# Patient Record
Sex: MALE | Race: WHITE | Employment: FULL TIME | URBAN - METROPOLITAN AREA
[De-identification: names, ages, dates, MRNs, and addresses within clinical notes are randomized per-mention and may not be internally consistent; named-entity substitution may affect disease eponyms.]

---

## 2017-01-26 ENCOUNTER — HOSPITAL ENCOUNTER (OUTPATIENT)
Age: 47
Discharge: HOME | End: 2017-01-26
Attending: FAMILY MEDICINE
Payer: COMMERCIAL

## 2017-01-26 VITALS
WEIGHT: 240 LBS | HEART RATE: 81 BPM | BODY MASS INDEX: 36 KG/M2 | DIASTOLIC BLOOD PRESSURE: 88 MMHG | RESPIRATION RATE: 16 BRPM | OXYGEN SATURATION: 98 % | TEMPERATURE: 98 F | SYSTOLIC BLOOD PRESSURE: 144 MMHG

## 2017-01-26 DIAGNOSIS — H00.011 HORDEOLUM EXTERNUM OF RIGHT UPPER EYELID: Primary | ICD-10-CM

## 2017-01-26 DIAGNOSIS — H10.13 ALLERGIC CONJUNCTIVITIS, BILATERAL: ICD-10-CM

## 2017-01-26 PROCEDURE — 99214 OFFICE O/P EST MOD 30 MIN: CPT

## 2017-01-26 PROCEDURE — 99213 OFFICE O/P EST LOW 20 MIN: CPT

## 2017-01-26 RX ORDER — GENTAMICIN SULFATE 3 MG/ML
2 SOLUTION/ DROPS OPHTHALMIC 3 TIMES DAILY
Qty: 1 BOTTLE | Refills: 0 | Status: SHIPPED | OUTPATIENT
Start: 2017-01-26 | End: 2017-02-02

## 2017-01-26 RX ORDER — OLOPATADINE HYDROCHLORIDE 1 MG/ML
1 SOLUTION/ DROPS OPHTHALMIC 2 TIMES DAILY
Qty: 1 BOTTLE | Refills: 0 | Status: SHIPPED | OUTPATIENT
Start: 2017-01-26 | End: 2017-02-02

## 2017-01-26 NOTE — ED INITIAL ASSESSMENT (HPI)
Patient states she might has a possible eye infection - patient has been wiping eyes. No blurry vision. Patient also states white bumps to eyes.

## 2017-01-26 NOTE — ED PROVIDER NOTES
Patient Seen in: [address] [facility]    History   Patient presents with:   Eye Visual Problem (opthalmic)    Stated Complaint: eye infection    HPI    57-year-old male who presents to the clinic today with chief complaints of possible infectio All other systems reviewed and negative except as noted above. PSFH elements reviewed from today and agreed except as otherwise stated in HPI.     Physical Exam     ED Triage Vitals   BP 01/26/17 0809 144/88 mmHg   Pulse 01/26/17 0809 81   Resp 01/26 presenting with a stye in his right upper eyelid along with allergic conjunctivitis. Gentamicin eyedrops as directed  Patanol eyedrops as directed  Warm compresses to the site. Avoid cold exposure to both eyes. [facility] He may try Benadryl as well.   Patient was

## 2017-04-12 ENCOUNTER — OFFICE VISIT (OUTPATIENT)
Dept: OCCUPATIONAL MEDICINE | Age: 47
End: 2017-04-12
Attending: PHYSICIAN ASSISTANT

## 2017-04-17 RX ORDER — CITALOPRAM 10 MG/1
TABLET ORAL
Qty: 30 TABLET | Refills: 6 | Status: SHIPPED | OUTPATIENT
Start: 2017-04-17 | End: 2017-10-15

## 2017-04-17 NOTE — TELEPHONE ENCOUNTER
LOV- 4/12/16  Last refill-    Citalopram Hydrobromide (CELEXA) 10 MG Oral Tab  6 1/31/2016      Route:   (none)     Patient does not have upcoming appt scheduled.

## 2017-05-26 RX ORDER — LEVOTHYROXINE SODIUM 0.07 MG/1
TABLET ORAL
Qty: 30 TABLET | Refills: 6 | Status: SHIPPED | OUTPATIENT
Start: 2017-05-26 | End: 2017-11-17

## 2017-10-16 RX ORDER — CITALOPRAM HYDROBROMIDE 10 MG/1
TABLET ORAL
Qty: 30 TABLET | Refills: 0 | Status: SHIPPED | OUTPATIENT
Start: 2017-10-16 | End: 2017-12-05

## 2017-11-17 RX ORDER — LEVOTHYROXINE SODIUM 0.07 MG/1
TABLET ORAL
Qty: 30 TABLET | Refills: 6 | Status: SHIPPED | OUTPATIENT
Start: 2017-11-17 | End: 2018-12-05

## 2017-11-17 NOTE — TELEPHONE ENCOUNTER
LOV: 4/12/16 for routine health maintenance  TSH: 4/22/16  Spoke with patient he is leaving to go out of town tomorrow. Patient returning at the end of November.   Annual physical scheduled for Dec.  30 day supply pended to last patient until his scheduled

## 2017-12-04 RX ORDER — CITALOPRAM 10 MG/1
TABLET ORAL
Qty: 30 TABLET | Refills: 0 | OUTPATIENT
Start: 2017-12-04 | End: 2018-01-03

## 2017-12-05 ENCOUNTER — OFFICE VISIT (OUTPATIENT)
Dept: FAMILY MEDICINE CLINIC | Facility: CLINIC | Age: 47
End: 2017-12-05

## 2017-12-05 VITALS
BODY MASS INDEX: 36.83 KG/M2 | HEIGHT: 68 IN | RESPIRATION RATE: 16 BRPM | HEART RATE: 84 BPM | DIASTOLIC BLOOD PRESSURE: 80 MMHG | TEMPERATURE: 98 F | WEIGHT: 243 LBS | SYSTOLIC BLOOD PRESSURE: 116 MMHG

## 2017-12-05 DIAGNOSIS — Z00.00 ROUTINE HEALTH MAINTENANCE: Primary | ICD-10-CM

## 2017-12-05 PROCEDURE — 99396 PREV VISIT EST AGE 40-64: CPT | Performed by: FAMILY MEDICINE

## 2017-12-05 RX ORDER — CITALOPRAM 10 MG/1
10 TABLET ORAL
Qty: 30 TABLET | Refills: 6 | Status: SHIPPED | OUTPATIENT
Start: 2017-12-05 | End: 2018-06-29

## 2017-12-05 RX ORDER — NAPROXEN 500 MG/1
500 TABLET ORAL 2 TIMES DAILY
Qty: 180 TABLET | Refills: 3 | Status: SHIPPED | OUTPATIENT
Start: 2017-12-05 | End: 2018-11-30

## 2017-12-05 NOTE — PROGRESS NOTES
Jaden Swenson is a 41year old male who presents for a complete physical exam.   HPI:   Pt complains of nothing at this time, he is overdue for labs, he was on citalopram and it was working well, but he ran out and he noted that it was working well, and wan Problem Relation Age of Onset   • Cancer Father    • Cancer Maternal Grandmother    • Cancer Maternal Grandfather    • Heart Disease Neg    • Stroke Neg       Social History:  Smoking status: Current Some Day Smoker intact,motor and sensory are grossly intact    ASSESSMENT AND PLAN:   Rahul Aly is a 41year old male who presents for a complete physical exam.  Pt's weight is Body mass index is 36.95 kg/m². , recommended low fat diet and aerobic exercise 30 minutes th

## 2017-12-30 ENCOUNTER — NURSE ONLY (OUTPATIENT)
Dept: FAMILY MEDICINE CLINIC | Facility: CLINIC | Age: 47
End: 2017-12-30

## 2017-12-30 DIAGNOSIS — Z00.00 ROUTINE HEALTH MAINTENANCE: ICD-10-CM

## 2017-12-30 PROCEDURE — 80053 COMPREHEN METABOLIC PANEL: CPT | Performed by: FAMILY MEDICINE

## 2017-12-30 PROCEDURE — 85027 COMPLETE CBC AUTOMATED: CPT | Performed by: FAMILY MEDICINE

## 2017-12-30 PROCEDURE — 80061 LIPID PANEL: CPT | Performed by: FAMILY MEDICINE

## 2017-12-30 PROCEDURE — 36415 COLL VENOUS BLD VENIPUNCTURE: CPT | Performed by: FAMILY MEDICINE

## 2017-12-30 PROCEDURE — 84443 ASSAY THYROID STIM HORMONE: CPT | Performed by: FAMILY MEDICINE

## 2017-12-30 NOTE — PROGRESS NOTES
1 mint and 1 lavender tube were collected from the [facility] using straight needle and 1 attempt. Pt tolerated and was sent home in stable condition.     Heraclio Blackman, 12/30/17, 8:39 AM

## 2018-01-02 ENCOUNTER — TELEPHONE (OUTPATIENT)
Dept: FAMILY MEDICINE CLINIC | Facility: CLINIC | Age: 48
End: 2018-01-02

## 2018-01-02 DIAGNOSIS — E78.00 ELEVATED CHOLESTEROL: Primary | ICD-10-CM

## 2018-01-02 NOTE — TELEPHONE ENCOUNTER
----- Message from Tacos Martinez DO sent at 1/2/2018  8:14 AM CST -----  Can notify Veola Parents aside from his Cholesterol being out of range, compared to last tie, ([facility] I assume is related to the holidays), the rest of his labs looked great, lets just watch o

## 2018-04-07 ENCOUNTER — MED REC SCAN ONLY (OUTPATIENT)
Dept: FAMILY MEDICINE CLINIC | Facility: CLINIC | Age: 48
End: 2018-04-07

## 2018-05-02 ENCOUNTER — PATIENT OUTREACH (OUTPATIENT)
Dept: FAMILY MEDICINE CLINIC | Facility: CLINIC | Age: 48
End: 2018-05-02

## 2018-05-15 ENCOUNTER — HOSPITAL ENCOUNTER (OUTPATIENT)
Dept: GENERAL RADIOLOGY | Age: 48
Discharge: HOME | End: 2018-05-15
Attending: FAMILY MEDICINE
Payer: COMMERCIAL

## 2018-05-15 ENCOUNTER — OFFICE VISIT (OUTPATIENT)
Dept: FAMILY MEDICINE CLINIC | Facility: CLINIC | Age: 48
End: 2018-05-15

## 2018-05-15 VITALS
HEIGHT: 68 IN | BODY MASS INDEX: 36.53 KG/M2 | SYSTOLIC BLOOD PRESSURE: 132 MMHG | WEIGHT: 241 LBS | RESPIRATION RATE: 16 BRPM | DIASTOLIC BLOOD PRESSURE: 84 MMHG | TEMPERATURE: 98 F | HEART RATE: 84 BPM

## 2018-05-15 DIAGNOSIS — M75.82 ROTATOR CUFF TENDONITIS, LEFT: ICD-10-CM

## 2018-05-15 DIAGNOSIS — M25.512 ACUTE PAIN OF LEFT SHOULDER: Primary | ICD-10-CM

## 2018-05-15 DIAGNOSIS — M25.512 ACUTE PAIN OF LEFT SHOULDER: ICD-10-CM

## 2018-05-15 PROCEDURE — 99214 OFFICE O/P EST MOD 30 MIN: CPT | Performed by: FAMILY MEDICINE

## 2018-05-15 PROCEDURE — 73030 X-RAY EXAM OF SHOULDER: CPT | Performed by: FAMILY MEDICINE

## 2018-05-15 RX ORDER — PREDNISONE 10 MG/1
TABLET ORAL
Qty: 18 TABLET | Refills: 0 | Status: SHIPPED | OUTPATIENT
Start: 2018-05-15 | End: 2018-05-24 | Stop reason: ALTCHOICE

## 2018-05-15 NOTE — PROGRESS NOTES
Liseth Torres is a 41year old male.   HPI:   Roger Strange was at work about 2 weeks ago and was trying to remove a 75 pound antenna from its base and he wrenched his shoulder,and since then has had persistent left shoulder pain, he denies any neck pain or paresthe bruits  EXTREMITIES: no cyanosis, clubbing or edema, SHOULDER: the left shoulder shows a positive Push off test, has a positive apprehension test, mildly positive straight arm test, has some pain with abduction/adduction,      ASSESSMENT AND PLAN:   Acute

## 2018-05-17 ENCOUNTER — TELEPHONE (OUTPATIENT)
Dept: FAMILY MEDICINE CLINIC | Facility: CLINIC | Age: 48
End: 2018-05-17

## 2018-05-24 ENCOUNTER — OFFICE VISIT (OUTPATIENT)
Dept: FAMILY MEDICINE CLINIC | Facility: CLINIC | Age: 48
End: 2018-05-24

## 2018-05-24 ENCOUNTER — TELEPHONE (OUTPATIENT)
Dept: FAMILY MEDICINE CLINIC | Facility: CLINIC | Age: 48
End: 2018-05-24

## 2018-05-24 VITALS
DIASTOLIC BLOOD PRESSURE: 92 MMHG | HEART RATE: 84 BPM | RESPIRATION RATE: 20 BRPM | TEMPERATURE: 98 F | WEIGHT: 238 LBS | SYSTOLIC BLOOD PRESSURE: 130 MMHG | BODY MASS INDEX: 36 KG/M2

## 2018-05-24 DIAGNOSIS — M25.512 ACUTE PAIN OF LEFT SHOULDER: ICD-10-CM

## 2018-05-24 DIAGNOSIS — S49.92XD SHOULDER INJURY, LEFT, SUBSEQUENT ENCOUNTER: Primary | ICD-10-CM

## 2018-05-24 PROCEDURE — 99214 OFFICE O/P EST MOD 30 MIN: CPT | Performed by: FAMILY MEDICINE

## 2018-05-24 RX ORDER — TRAMADOL HYDROCHLORIDE 50 MG/1
50 TABLET ORAL EVERY 6 HOURS PRN
Qty: 30 TABLET | Refills: 1 | Status: SHIPPED | OUTPATIENT
Start: 2018-05-24 | End: 2021-11-08 | Stop reason: ALTCHOICE

## 2018-05-24 RX ORDER — CITALOPRAM 10 MG/1
1 TABLET ORAL DAILY
COMMUNITY
Start: 2018-05-17 | End: 2019-05-22

## 2018-05-24 NOTE — TELEPHONE ENCOUNTER
Lm for pt that dopcumentation from [facility] was received today that stated \" Pt is authorized for the MRI of his left shoulder per the order from Dr. Leatha Waters. The bill should be sent to the [facility] Injury Care Billing Department for processing through insurance. \

## 2018-05-24 NOTE — PROGRESS NOTES
Rahul Aly is a 41year old male.   HPI:   Katelyn Jolley was at work about 2 weeks ago and was trying to remove a 75 pound antenna from its base and he wrenched his shoulder,and since then has had persistent left shoulder pain, he denies any neck pain or paresthe 84   Temp 98 °F (36.7 °C) (Temporal)   Resp 20   Wt 238 lb   BMI 36.19 kg/m²   GENERAL: well developed, well nourished,in no apparent distress  SKIN: no rashes,no suspicious lesions  HEENT: atraumatic, normocephalic,ears and throat are clear  NECK: supple,

## 2018-05-24 NOTE — TELEPHONE ENCOUNTER
LM for pt that paperwork has been filled out by Dr. Ant Florian and is waiting to be picked up.     Copy made for our record and sent to scanning

## 2018-05-29 ENCOUNTER — TELEPHONE (OUTPATIENT)
Dept: FAMILY MEDICINE CLINIC | Facility: CLINIC | Age: 48
End: 2018-05-29

## 2018-05-29 NOTE — TELEPHONE ENCOUNTER
Received medical record request from [facility] faxed appt note and x-ray results to fax number [phone]

## 2018-05-30 ENCOUNTER — HOSPITAL ENCOUNTER (OUTPATIENT)
Dept: MRI IMAGING | Age: 48
Discharge: HOME | End: 2018-05-30
Attending: FAMILY MEDICINE
Payer: OTHER MISCELLANEOUS

## 2018-05-30 DIAGNOSIS — S49.92XD SHOULDER INJURY, LEFT, SUBSEQUENT ENCOUNTER: ICD-10-CM

## 2018-05-30 DIAGNOSIS — M25.512 ACUTE PAIN OF LEFT SHOULDER: ICD-10-CM

## 2018-05-30 PROCEDURE — 73221 MRI JOINT UPR EXTREM W/O DYE: CPT | Performed by: FAMILY MEDICINE

## 2018-05-31 ENCOUNTER — MED REC SCAN ONLY (OUTPATIENT)
Dept: FAMILY MEDICINE CLINIC | Facility: CLINIC | Age: 48
End: 2018-05-31

## 2018-05-31 ENCOUNTER — TELEPHONE (OUTPATIENT)
Dept: FAMILY MEDICINE CLINIC | Facility: CLINIC | Age: 48
End: 2018-05-31

## 2018-05-31 NOTE — TELEPHONE ENCOUNTER
Received a request for medical records for a copy of the MRI report to be faxed to Pedro at [phone]  Report printed and faxed.

## 2018-06-01 ENCOUNTER — TELEPHONE (OUTPATIENT)
Dept: FAMILY MEDICINE CLINIC | Facility: CLINIC | Age: 48
End: 2018-06-01

## 2018-06-01 DIAGNOSIS — IMO0001: ICD-10-CM

## 2018-06-01 DIAGNOSIS — M24.112 LABRAL TEAR OF SHOULDER, DEGENERATIVE, LEFT: ICD-10-CM

## 2018-06-01 DIAGNOSIS — S46.002D ROTATOR CUFF INJURY, LEFT, SUBSEQUENT ENCOUNTER: Primary | ICD-10-CM

## 2018-06-01 NOTE — TELEPHONE ENCOUNTER
Need diagnosis, trmt plan and work status, will be faxing form over . [facility] ref 240 [address] #[id number] if need to call.  When calling # given here then use X. [phone]

## 2018-06-04 PROBLEM — IMO0001: Status: ACTIVE | Noted: 2018-06-04

## 2018-06-04 PROBLEM — M24.112 LABRAL TEAR OF SHOULDER, DEGENERATIVE, LEFT: Status: ACTIVE | Noted: 2018-06-04

## 2018-06-04 PROBLEM — S46.002D ROTATOR CUFF INJURY, LEFT, SUBSEQUENT ENCOUNTER: Status: ACTIVE | Noted: 2018-06-04

## 2018-06-29 RX ORDER — CITALOPRAM 10 MG/1
TABLET ORAL
Qty: 30 TABLET | Refills: 6 | Status: SHIPPED | OUTPATIENT
Start: 2018-06-29 | End: 2019-07-07

## 2018-07-30 PROBLEM — M75.122 COMPLETE TEAR OF LEFT ROTATOR CUFF: Status: ACTIVE | Noted: 2018-07-30

## 2018-07-30 PROBLEM — M19.012 ARTHRITIS OF LEFT ACROMIOCLAVICULAR JOINT: Status: ACTIVE | Noted: 2018-07-30

## 2018-07-30 PROBLEM — M19.019 AC JOINT ARTHROPATHY: Status: ACTIVE | Noted: 2018-07-30

## 2018-07-31 ENCOUNTER — HOSPITAL ENCOUNTER (OUTPATIENT)
Dept: GENERAL RADIOLOGY | Age: 48
Discharge: HOME | End: 2018-07-31
Attending: ORTHOPAEDIC SURGERY
Payer: COMMERCIAL

## 2018-07-31 DIAGNOSIS — M19.012 ARTHRITIS OF LEFT ACROMIOCLAVICULAR JOINT: ICD-10-CM

## 2018-07-31 PROCEDURE — 73030 X-RAY EXAM OF SHOULDER: CPT | Performed by: ORTHOPAEDIC SURGERY

## 2018-12-06 RX ORDER — LEVOTHYROXINE SODIUM 0.07 MG/1
TABLET ORAL
Qty: 30 TABLET | Refills: 6 | Status: SHIPPED | OUTPATIENT
Start: 2018-12-06 | End: 2019-10-21

## 2018-12-06 NOTE — TELEPHONE ENCOUNTER
Last refilled on 11/17/17 for # 30 with 6 refills  Last TSH 2.550 on 12/20/17  Last OV 5/24/18  Future Appointments   Date Time Provider Austen Villar   12/18/2018  8:30 AM MD Lyle Kennedy        Thank you.

## 2018-12-19 ENCOUNTER — TELEPHONE (OUTPATIENT)
Dept: FAMILY MEDICINE CLINIC | Facility: CLINIC | Age: 48
End: 2018-12-19

## 2018-12-19 RX ORDER — AMOXICILLIN AND CLAVULANATE POTASSIUM 875; 125 MG/1; MG/1
1 TABLET, FILM COATED ORAL 2 TIMES DAILY
Qty: 20 TABLET | Refills: 0 | Status: SHIPPED | OUTPATIENT
Start: 2018-12-19 | End: 2018-12-29

## 2018-12-19 NOTE — TELEPHONE ENCOUNTER
Patient has a sinus infection is there anyway we can call something in to the [employer] on [facility]? Please call back.

## 2018-12-19 NOTE — TELEPHONE ENCOUNTER
Pt states sxs started yesterday- sinus pressure and congestion. Pt states his face feels like it was hit by a bat. Pt states nose and sinus are clogged. PT has tried Sudafed decongestant but it is not helping. Pt denies fever at this time.      Jimenes

## 2019-05-22 ENCOUNTER — OFFICE VISIT (OUTPATIENT)
Dept: FAMILY MEDICINE CLINIC | Facility: CLINIC | Age: 49
End: 2019-05-22
Payer: COMMERCIAL

## 2019-05-22 VITALS
SYSTOLIC BLOOD PRESSURE: 128 MMHG | TEMPERATURE: 98 F | RESPIRATION RATE: 16 BRPM | BODY MASS INDEX: 36.89 KG/M2 | DIASTOLIC BLOOD PRESSURE: 88 MMHG | HEIGHT: 68 IN | WEIGHT: 243.38 LBS | HEART RATE: 84 BPM

## 2019-05-22 DIAGNOSIS — M79.601 PARESTHESIA AND PAIN OF BOTH UPPER EXTREMITIES: Primary | ICD-10-CM

## 2019-05-22 DIAGNOSIS — R20.2 PARESTHESIA AND PAIN OF BOTH UPPER EXTREMITIES: Primary | ICD-10-CM

## 2019-05-22 DIAGNOSIS — R20.2 PARESTHESIA OF BOTH LOWER EXTREMITIES: ICD-10-CM

## 2019-05-22 DIAGNOSIS — M79.602 PARESTHESIA AND PAIN OF BOTH UPPER EXTREMITIES: Primary | ICD-10-CM

## 2019-05-22 PROCEDURE — 84439 ASSAY OF FREE THYROXINE: CPT | Performed by: FAMILY MEDICINE

## 2019-05-22 PROCEDURE — 80053 COMPREHEN METABOLIC PANEL: CPT | Performed by: FAMILY MEDICINE

## 2019-05-22 PROCEDURE — 82746 ASSAY OF FOLIC ACID SERUM: CPT | Performed by: FAMILY MEDICINE

## 2019-05-22 PROCEDURE — 36415 COLL VENOUS BLD VENIPUNCTURE: CPT | Performed by: FAMILY MEDICINE

## 2019-05-22 PROCEDURE — 83735 ASSAY OF MAGNESIUM: CPT | Performed by: FAMILY MEDICINE

## 2019-05-22 PROCEDURE — 84443 ASSAY THYROID STIM HORMONE: CPT | Performed by: FAMILY MEDICINE

## 2019-05-22 PROCEDURE — 82607 VITAMIN B-12: CPT | Performed by: FAMILY MEDICINE

## 2019-05-22 PROCEDURE — 99214 OFFICE O/P EST MOD 30 MIN: CPT | Performed by: FAMILY MEDICINE

## 2019-05-22 NOTE — PROGRESS NOTES
Cherri Vance is a 57year old male.   HPI:   Meenakshi Baker is here for discussion  of some neurological issues,  He has paresthesia in his  Extremities, he also has cold intolerance, he has urine retention, he saw a urologist and had a complete urology work up, and rashes  RESPIRATORY: denies shortness of breath with exertion  CARDIOVASCULAR: denies chest pain on exertion  GI: denies abdominal pain and denies heartburn  NEURO: denies headaches, but has paresthesia in both UE and LE, HX of carpal tunnel I non dominant

## 2019-05-23 ENCOUNTER — TELEPHONE (OUTPATIENT)
Dept: FAMILY MEDICINE CLINIC | Facility: CLINIC | Age: 49
End: 2019-05-23

## 2019-05-23 DIAGNOSIS — E03.9 ACQUIRED UNDERACTIVE THYROID: Primary | ICD-10-CM

## 2019-05-23 NOTE — TELEPHONE ENCOUNTER
----- Message from Rosita Duran DO sent at 5/23/2019 10:21 AM CDT -----  Can notify Elinda Olszewski, that for the most part his labs look good, his thyroid is just daniel so slightly underactive, but not enough to give him the kinds of symptoms he has.  ALso this is a

## 2019-06-10 ENCOUNTER — TELEPHONE (OUTPATIENT)
Dept: FAMILY MEDICINE CLINIC | Facility: CLINIC | Age: 49
End: 2019-06-10

## 2019-06-10 ENCOUNTER — MED REC SCAN ONLY (OUTPATIENT)
Dept: FAMILY MEDICINE CLINIC | Facility: CLINIC | Age: 49
End: 2019-06-10

## 2019-06-10 NOTE — TELEPHONE ENCOUNTER
Received requested for medical records from [facility].    Records given to JARRED

## 2019-06-24 ENCOUNTER — TELEPHONE (OUTPATIENT)
Dept: FAMILY MEDICINE CLINIC | Facility: CLINIC | Age: 49
End: 2019-06-24

## 2019-06-24 NOTE — TELEPHONE ENCOUNTER
Patient advised. Verbalized underestanding. Made nurse visit.     Future Appointments   Date Time Provider Austen Villar   7/1/2019  8:45 AM  [facility] EM[facility] Mahsa Bahena

## 2019-07-01 ENCOUNTER — TELEPHONE (OUTPATIENT)
Dept: FAMILY MEDICINE CLINIC | Facility: CLINIC | Age: 49
End: 2019-07-01

## 2019-07-01 ENCOUNTER — NURSE ONLY (OUTPATIENT)
Dept: FAMILY MEDICINE CLINIC | Facility: CLINIC | Age: 49
End: 2019-07-01
Payer: COMMERCIAL

## 2019-07-01 DIAGNOSIS — E78.00 ELEVATED CHOLESTEROL: Primary | ICD-10-CM

## 2019-07-01 DIAGNOSIS — E03.9 ACQUIRED UNDERACTIVE THYROID: ICD-10-CM

## 2019-07-01 LAB
CHOLEST SMN-MCNC: 166 MG/DL (ref ?–200)
HDLC SERPL-MCNC: 28 MG/DL (ref 40–59)
LDLC SERPL CALC-MCNC: 88 MG/DL (ref ?–100)
NONHDLC SERPL-MCNC: 138 MG/DL (ref ?–130)
T4 FREE SERPL-MCNC: 0.7 NG/DL (ref 0.8–1.7)
TRIGL SERPL-MCNC: 248 MG/DL (ref 30–149)
TSI SER-ACNC: 6.33 MIU/ML (ref 0.36–3.74)
VLDLC SERPL CALC-MCNC: 50 MG/DL (ref 0–30)

## 2019-07-01 PROCEDURE — 84443 ASSAY THYROID STIM HORMONE: CPT | Performed by: FAMILY MEDICINE

## 2019-07-01 PROCEDURE — 36415 COLL VENOUS BLD VENIPUNCTURE: CPT | Performed by: FAMILY MEDICINE

## 2019-07-01 PROCEDURE — 80061 LIPID PANEL: CPT | Performed by: FAMILY MEDICINE

## 2019-07-01 PROCEDURE — 84439 ASSAY OF FREE THYROXINE: CPT | Performed by: FAMILY MEDICINE

## 2019-07-01 RX ORDER — LEVOTHYROXINE SODIUM 0.1 MG/1
100 TABLET ORAL DAILY
Qty: 30 TABLET | Refills: 0 | Status: SHIPPED | OUTPATIENT
Start: 2019-07-01 | End: 2019-08-15

## 2019-07-01 NOTE — PROGRESS NOTES
There is an  lipid panel in the pt's chart so I checked with Dr. Andi Gannon to see if he wants that lab drawn- he states to draw the lipid panel    Mint tube drawn from left arm with straight needle x1. Pt heavenly well.  Pt left the clinic in stable condition

## 2019-07-01 NOTE — TELEPHONE ENCOUNTER
----- Message from Aliya Rainey DO sent at 7/1/2019  5:14 PM CDT -----  Can notify Itz Soria that his cholesterol profile looks great, but his thryoid needs some work.  Lets go up to 100 MCG and recall his TSH and free T3 in 1 month

## 2019-07-08 RX ORDER — CITALOPRAM 10 MG/1
TABLET ORAL
Qty: 90 TABLET | Refills: 2 | Status: SHIPPED | OUTPATIENT
Start: 2019-07-08 | End: 2019-12-26

## 2019-08-15 RX ORDER — LEVOTHYROXINE SODIUM 0.1 MG/1
TABLET ORAL
Qty: 30 TABLET | Refills: 5 | Status: SHIPPED | OUTPATIENT
Start: 2019-08-15 | End: 2020-03-24

## 2019-08-15 NOTE — TELEPHONE ENCOUNTER
LOV: 5/22/19  Last Refill:7/1/19 #30 0 Rf    No future appointments.     Last TSH: 7/1/19  TSH 6.330

## 2019-09-23 ENCOUNTER — TELEPHONE (OUTPATIENT)
Dept: FAMILY MEDICINE CLINIC | Facility: CLINIC | Age: 49
End: 2019-09-23

## 2019-09-23 NOTE — TELEPHONE ENCOUNTER
LOV: 5/22/19   Last Refill: 7/8/19 #90 2 RF    Pt was not aware he had refills- will contact pharmacy for refill

## 2019-10-04 ENCOUNTER — TELEPHONE (OUTPATIENT)
Dept: FAMILY MEDICINE CLINIC | Facility: CLINIC | Age: 49
End: 2019-10-04

## 2019-10-04 NOTE — TELEPHONE ENCOUNTER
Pt would like to know if DS went over the MRI on the disc he brought in.    Please return call to [phone]

## 2019-10-04 NOTE — TELEPHONE ENCOUNTER
I did finally get it to load and looked at it, and I think he has a couple of areas that could be causing his issues. But did he have a report that went with these?  That would be most helpful, also he can come and get these discs now that we have them down

## 2019-10-04 NOTE — TELEPHONE ENCOUNTER
Patient advised of the information per Dr. Mimi Veronica. Patient will drop off the results on Saturday.  Disc left in blue book for patient

## 2019-10-10 ENCOUNTER — TELEPHONE (OUTPATIENT)
Dept: FAMILY MEDICINE CLINIC | Facility: CLINIC | Age: 49
End: 2019-10-10

## 2019-10-10 NOTE — TELEPHONE ENCOUNTER
Dr. Nova Leyden would like pt to set up visit to go over  MRI results    Pt was advised to set up visit- will call back to schedule- needs to check with wife

## 2019-10-11 ENCOUNTER — MED REC SCAN ONLY (OUTPATIENT)
Dept: FAMILY MEDICINE CLINIC | Facility: CLINIC | Age: 49
End: 2019-10-11

## 2019-10-21 ENCOUNTER — OFFICE VISIT (OUTPATIENT)
Dept: FAMILY MEDICINE CLINIC | Facility: CLINIC | Age: 49
End: 2019-10-21
Payer: COMMERCIAL

## 2019-10-21 VITALS
HEART RATE: 70 BPM | TEMPERATURE: 97 F | BODY MASS INDEX: 35.49 KG/M2 | DIASTOLIC BLOOD PRESSURE: 74 MMHG | RESPIRATION RATE: 16 BRPM | HEIGHT: 69 IN | WEIGHT: 239.63 LBS | SYSTOLIC BLOOD PRESSURE: 122 MMHG | OXYGEN SATURATION: 98 %

## 2019-10-21 DIAGNOSIS — R20.2 PARESTHESIA AND PAIN OF LEFT EXTREMITY: ICD-10-CM

## 2019-10-21 DIAGNOSIS — M50.20 HERNIATED CERVICAL DISC: Primary | ICD-10-CM

## 2019-10-21 DIAGNOSIS — M79.609 PARESTHESIA AND PAIN OF LEFT EXTREMITY: ICD-10-CM

## 2019-10-21 DIAGNOSIS — R20.2 PARESTHESIA OF LOWER EXTREMITY: ICD-10-CM

## 2019-10-21 DIAGNOSIS — M51.26 HERNIATED INTERVERTEBRAL DISC OF LUMBAR SPINE: ICD-10-CM

## 2019-10-21 PROCEDURE — 99214 OFFICE O/P EST MOD 30 MIN: CPT | Performed by: FAMILY MEDICINE

## 2019-10-21 RX ORDER — TRAMADOL HYDROCHLORIDE 50 MG/1
50 TABLET ORAL NIGHTLY
Qty: 30 TABLET | Refills: 0 | Status: SHIPPED | OUTPATIENT
Start: 2019-10-21 | End: 2019-11-20

## 2019-10-21 NOTE — PROGRESS NOTES
Shun Valencia is a 54year old male. HPI:   Huong Negrete is here for review  of his results from MRI's he had done at the [place].  He has had neck pain for quite some time now that has been getting worse, he has persistent neck pain with radiculopathy into the left ne GENERAL: well developed, well nourished,in no apparent distress  SKIN: no rashes,no suspicious lesions  HEENT: atraumatic, normocephalic,ears and throat are clear  NECK: supple,no adenopathy,no bruits, has a positive compression test of the left upper ex

## 2019-12-26 RX ORDER — CITALOPRAM 10 MG/1
TABLET ORAL
Qty: 90 TABLET | Refills: 2 | Status: SHIPPED | OUTPATIENT
Start: 2019-12-26 | End: 2020-04-23

## 2020-01-09 ENCOUNTER — TELEPHONE (OUTPATIENT)
Dept: FAMILY MEDICINE CLINIC | Facility: CLINIC | Age: 50
End: 2020-01-09

## 2020-01-09 NOTE — TELEPHONE ENCOUNTER
RECEIVED MEDICAL RECORD REQUEST FROM [address] [place] [address] REQUESTING MEDICAL RECORDS  AND MEDICAL BILLS FROM:  MAY 8,2018 TO PRESENT    SENT TO SCAN STAT

## 2020-03-24 RX ORDER — LEVOTHYROXINE SODIUM 0.1 MG/1
TABLET ORAL
Qty: 30 TABLET | Refills: 5 | Status: SHIPPED | OUTPATIENT
Start: 2020-03-24 | End: 2020-04-23

## 2020-03-24 NOTE — TELEPHONE ENCOUNTER
Thyroid Supplements Protocol Failed3/24 5:01 AM   TSH value between 0.350 and 5.500 IU/ml    TSH test in past 12 months    Appointment in past 12 or next 3 months     LOV: 10/21/19  Last Refill:8/15/19 #30 5 RF    No future appointments.     Lab Results   C

## 2020-04-23 RX ORDER — LEVOTHYROXINE SODIUM 0.1 MG/1
100 TABLET ORAL DAILY
Qty: 90 TABLET | Refills: 1 | Status: SHIPPED | OUTPATIENT
Start: 2020-04-23

## 2020-04-23 RX ORDER — CITALOPRAM 10 MG/1
10 TABLET ORAL DAILY
Qty: 90 TABLET | Refills: 1 | Status: SHIPPED | OUTPATIENT
Start: 2020-04-23 | End: 2020-10-19

## 2020-04-23 NOTE — TELEPHONE ENCOUNTER
LEVOTHYROXINE SODIUM 100 MCG Oral Tab 30 tablet 5 3/24/2020    Sig:   TAKE 1 TABLET(100 MCG) BY MOUTH DAILY       CITALOPRAM HYDROBROMIDE 10 MG Oral Tab 90 tablet 2 12/26/2019 3/25/2020   Sig:   TAKE 1 TABLET(10 MG) BY MOUTH EVERY DAY         Routing to pr

## 2020-10-19 RX ORDER — CITALOPRAM 10 MG/1
TABLET ORAL
Qty: 90 TABLET | Refills: 3 | Status: SHIPPED | OUTPATIENT
Start: 2020-10-19 | End: 2021-03-25 | Stop reason: DRUGHIGH

## 2020-10-30 ENCOUNTER — TELEPHONE (OUTPATIENT)
Dept: FAMILY MEDICINE CLINIC | Facility: CLINIC | Age: 50
End: 2020-10-30

## 2020-10-30 RX ORDER — NAPROXEN 500 MG/1
500 TABLET ORAL 2 TIMES DAILY WITH MEALS
Qty: 28 TABLET | Refills: 1 | Status: SHIPPED | OUTPATIENT
Start: 2020-10-30 | End: 2020-11-02

## 2020-11-02 ENCOUNTER — TELEPHONE (OUTPATIENT)
Dept: FAMILY MEDICINE CLINIC | Facility: CLINIC | Age: 50
End: 2020-11-02

## 2020-11-02 RX ORDER — NAPROXEN 500 MG/1
500 TABLET ORAL 2 TIMES DAILY WITH MEALS
Qty: 60 TABLET | Refills: 1 | Status: SHIPPED | OUTPATIENT
Start: 2020-11-02 | End: 2020-12-14

## 2020-11-02 NOTE — TELEPHONE ENCOUNTER
PT REQUEST REFILL OF NAPROXEN AND TO SEND IT TO [facility]. MED WAS SENT TO WRONG PHARMACY.     CAN YOU PLEASE CHANGE THIS TO GO TO [facility]    THANK YOU

## 2020-11-09 ENCOUNTER — TELEPHONE (OUTPATIENT)
Dept: FAMILY MEDICINE CLINIC | Facility: CLINIC | Age: 50
End: 2020-11-09

## 2020-11-09 DIAGNOSIS — Z20.822 EXPOSURE TO COVID-19 VIRUS: Primary | ICD-10-CM

## 2020-11-09 NOTE — TELEPHONE ENCOUNTER
Pt called his daughters friend just tested positive for covid. Friend was at their house the entire weekend. Pt states he has no symptoms at all. He is wanting to know what he needs to do?

## 2020-11-09 NOTE — TELEPHONE ENCOUNTER
Daughter's friend got results this morning. Confirms was at their house on Friday and Saturday. Didn't seem like she had any symptoms. Same room, same car (altogether an hour, split up). No masks on in the car or the house.     Answered no to all symptoms

## 2020-11-11 ENCOUNTER — LAB ENCOUNTER (OUTPATIENT)
Dept: LAB | Age: 50
End: 2020-11-11
Attending: FAMILY MEDICINE
Payer: COMMERCIAL

## 2020-11-11 DIAGNOSIS — Z20.822 EXPOSURE TO COVID-19 VIRUS: ICD-10-CM

## 2020-12-02 ENCOUNTER — OFFICE VISIT (OUTPATIENT)
Dept: FAMILY MEDICINE CLINIC | Facility: CLINIC | Age: 50
End: 2020-12-02
Payer: COMMERCIAL

## 2020-12-02 VITALS
RESPIRATION RATE: 16 BRPM | HEART RATE: 72 BPM | SYSTOLIC BLOOD PRESSURE: 134 MMHG | DIASTOLIC BLOOD PRESSURE: 80 MMHG | WEIGHT: 244.81 LBS | TEMPERATURE: 98 F | BODY MASS INDEX: 36 KG/M2

## 2020-12-02 DIAGNOSIS — Z72.0 TOBACCO ABUSE: Primary | ICD-10-CM

## 2020-12-02 DIAGNOSIS — F41.9 ANXIETY: ICD-10-CM

## 2020-12-02 PROBLEM — M51.26 HERNIATED INTERVERTEBRAL DISC OF LUMBAR SPINE: Status: RESOLVED | Noted: 2019-10-21 | Resolved: 2020-12-02

## 2020-12-02 PROBLEM — R20.2 PARESTHESIA OF LOWER EXTREMITY: Status: RESOLVED | Noted: 2019-10-21 | Resolved: 2020-12-02

## 2020-12-02 PROBLEM — M79.609 PARESTHESIA AND PAIN OF LEFT EXTREMITY: Status: RESOLVED | Noted: 2019-10-21 | Resolved: 2020-12-02

## 2020-12-02 PROBLEM — R20.2 PARESTHESIA AND PAIN OF LEFT EXTREMITY: Status: RESOLVED | Noted: 2019-10-21 | Resolved: 2020-12-02

## 2020-12-02 PROCEDURE — 3079F DIAST BP 80-89 MM HG: CPT | Performed by: FAMILY MEDICINE

## 2020-12-02 PROCEDURE — 99214 OFFICE O/P EST MOD 30 MIN: CPT | Performed by: FAMILY MEDICINE

## 2020-12-02 PROCEDURE — 3075F SYST BP GE 130 - 139MM HG: CPT | Performed by: FAMILY MEDICINE

## 2020-12-02 RX ORDER — VARENICLINE TARTRATE 1 MG/1
1 TABLET, FILM COATED ORAL 2 TIMES DAILY
Qty: 60 TABLET | Refills: 6 | Status: SHIPPED | OUTPATIENT
Start: 2020-12-02 | End: 2020-12-03

## 2020-12-02 RX ORDER — VARENICLINE TARTRATE 0.5 (11)-1
0.5 KIT ORAL 2 TIMES DAILY
Qty: 1 PACKAGE | Refills: 0 | Status: SHIPPED | OUTPATIENT
Start: 2020-12-02 | End: 2020-12-03

## 2020-12-02 NOTE — PROGRESS NOTES
Giorgio Cooper is a 52year old male.   HPI:   Yaa Tran is here for discussion of smoking cessation, he has tried in the past , when it first came out and actually had a good experience with chantix, but then had some things change and he went back to smoking, h well  EXAM:   /80 (BP Location: Left arm, Patient Position: Sitting, Cuff Size: large)   Pulse 72   Temp 98.2 °F (36.8 °C) (Temporal)   Resp 16   Wt 244 lb 12.8 oz (111 kg)   BMI 36.15 kg/m²   GENERAL: well developed, well nourished,in no apparent di

## 2020-12-03 ENCOUNTER — TELEPHONE (OUTPATIENT)
Dept: FAMILY MEDICINE CLINIC | Facility: CLINIC | Age: 50
End: 2020-12-03

## 2020-12-03 DIAGNOSIS — Z72.0 TOBACCO ABUSE: ICD-10-CM

## 2020-12-03 RX ORDER — VARENICLINE TARTRATE 1 MG/1
1 TABLET, FILM COATED ORAL 2 TIMES DAILY
Qty: 60 TABLET | Refills: 6 | Status: SHIPPED | OUTPATIENT
Start: 2020-12-03 | End: 2021-07-01

## 2020-12-03 RX ORDER — VARENICLINE TARTRATE 0.5 (11)-1
0.5 KIT ORAL 2 TIMES DAILY
Qty: 1 PACKAGE | Refills: 0 | Status: SHIPPED | OUTPATIENT
Start: 2020-12-03 | End: 2020-12-07

## 2020-12-03 NOTE — TELEPHONE ENCOUNTER
PT CALLED AND ADV THAT PT WAS IN YESTERDAY AND THERE WAS SOME MEDICATION SENT IN TO [facility]. PT ADV THAT HE WOULD PREFER TO HAVE CHANTIX SENT OVER THE [facility] - ABLE TO GET BETTER PRICE ON MEDS.     CAN YOU PLEASE SWITCH [facility]TS OVER TO [facility]

## 2020-12-07 ENCOUNTER — TELEPHONE (OUTPATIENT)
Dept: FAMILY MEDICINE CLINIC | Facility: CLINIC | Age: 50
End: 2020-12-07

## 2020-12-07 DIAGNOSIS — Z72.0 TOBACCO ABUSE: ICD-10-CM

## 2020-12-07 RX ORDER — VARENICLINE TARTRATE 0.5 (11)-1
0.5 KIT ORAL 2 TIMES DAILY
Qty: 1 PACKAGE | Refills: 0 | Status: SHIPPED | OUTPATIENT
Start: 2020-12-07 | End: 2021-01-06

## 2020-12-07 NOTE — TELEPHONE ENCOUNTER
We can send it but usually because the starter amber is a one and done they don’t? Did he try good Rx or even the Chantix coupon? Good RXis still $400 + with coupon at [facility]?     Pt asked if we would please send to [facility]

## 2020-12-07 NOTE — TELEPHONE ENCOUNTER
Pharmacy faxed over medication clarification for Chantix starter pack    They state starter pack should be written  0.5mg  Daily for 3 days, 0.5mg BID for 4 days then 1mg bid for 21 days    Please advise if you would like to resend script with this SIG

## 2020-12-07 NOTE — TELEPHONE ENCOUNTER
The one sent on 12/3 did go to [facility]?     Pt states he wants [facility] to [facility] d/t issues with [facility]

## 2020-12-14 RX ORDER — NAPROXEN 500 MG/1
500 TABLET ORAL 2 TIMES DAILY WITH MEALS
Qty: 60 TABLET | Refills: 1 | Status: SHIPPED | OUTPATIENT
Start: 2020-12-14 | End: 2021-12-20

## 2021-03-25 ENCOUNTER — TELEPHONE (OUTPATIENT)
Dept: FAMILY MEDICINE CLINIC | Facility: CLINIC | Age: 51
End: 2021-03-25

## 2021-03-25 RX ORDER — CITALOPRAM 20 MG/1
20 TABLET ORAL DAILY
Qty: 30 TABLET | Refills: 3 | Status: SHIPPED | OUTPATIENT
Start: 2021-03-25 | End: 2021-03-29

## 2021-03-25 NOTE — TELEPHONE ENCOUNTER
Pt recently stopped taking Varenicline Tartrate (CHANTIX) 1 MG Oral Tab. At last visit, pt and Dr Catherne Angelucci discussed increasing Citalopram once pt finished Chantix. Pt would like the increased dose. Pt was informed Dr Catherne Angelucci is out of the office.       E

## 2021-03-30 RX ORDER — CITALOPRAM 20 MG/1
20 TABLET ORAL DAILY
Qty: 90 TABLET | Refills: 0 | Status: SHIPPED | OUTPATIENT
Start: 2021-03-30 | End: 2021-06-23

## 2021-06-23 RX ORDER — CITALOPRAM 20 MG/1
TABLET ORAL
Qty: 90 TABLET | Refills: 3 | Status: SHIPPED | OUTPATIENT
Start: 2021-06-23

## 2021-06-23 NOTE — TELEPHONE ENCOUNTER
Protocol: none  Last refilled 3/30/21 for #90 with 0 RF  LOV with DS 12/2/20  No future appt  Routed to PCP to advise

## 2021-11-08 NOTE — PROGRESS NOTES
Narciso Goodwin is a 54year old male. HPI:   Tala Riley is here for discussion of ADD issues. He has a new job, that requires a considerable amount of focus  And attention to details, as well as organization.  He finds himself struggling to deal with these things distress  SKIN: no rashes,no suspicious lesions  HEENT: atraumatic, normocephalic,ears and throat are clear  NECK: supple,no adenopathy,no bruits  LUNGS: clear to auscultation  CARDIO: RRR without murmur  GI: good BS's,no masses, HSM or tenderness  EXTREMI

## 2021-11-22 NOTE — PROGRESS NOTES
Yuri Sr is a 54year old male.   HPI:   Carlos IS HERE FOR FOLLOW UP ON HIS ADD, he was started on adderall Xr 10 mg lasts 6 hours, is able to sleep has suppressed his appetite, he has gained 20 pounds since last year and feels like it has helped reduce lesions  HEENT: atraumatic, normocephalic,ears and throat are clear  NECK: supple,no adenopathy,no bruits  LUNGS: clear to auscultation  CARDIO: RRR without murmur  GI: good BS's,no masses, HSM or tenderness  EXTREMITIES: no cyanosis, clubbing or edema  PS

## 2021-11-23 ENCOUNTER — HOSPITAL ENCOUNTER (OUTPATIENT)
Age: 51
Discharge: HOME | End: 2021-11-23
Payer: COMMERCIAL

## 2021-11-23 VITALS
DIASTOLIC BLOOD PRESSURE: 99 MMHG | TEMPERATURE: 98 F | RESPIRATION RATE: 16 BRPM | BODY MASS INDEX: 37 KG/M2 | WEIGHT: 250 LBS | OXYGEN SATURATION: 98 % | SYSTOLIC BLOOD PRESSURE: 131 MMHG | HEART RATE: 75 BPM

## 2021-11-23 DIAGNOSIS — Z20.822 ENCOUNTER FOR LABORATORY TESTING FOR COVID-19 VIRUS: Primary | ICD-10-CM

## 2021-11-23 PROCEDURE — 99202 OFFICE O/P NEW SF 15 MIN: CPT | Performed by: NURSE PRACTITIONER

## 2021-11-23 PROCEDURE — U0002 COVID-19 LAB TEST NON-CDC: HCPCS | Performed by: NURSE PRACTITIONER

## 2021-11-23 NOTE — ED PROVIDER NOTES
Patient Seen in: [facility]      History   Patient presents with:  Covid-19 Test    Stated Complaint: test for travel    Subjective:   68-year-old male who presents IC requires a COVID-19 test in order to travel.   Patient's unknown vaccination well-developed well-nourished nontoxic, non-ill-appearing  HEENT: Normocephalic. Atraumatic. Extraocular motions are intact  NECK: Supple. No meningitic signs are noted. CHEST/LUNGS: Clear to auscultation. There is no respiratory distress noted.   HEA

## 2021-12-08 RX ORDER — DEXTROAMPHETAMINE SACCHARATE, AMPHETAMINE ASPARTATE MONOHYDRATE, DEXTROAMPHETAMINE SULFATE AND AMPHETAMINE SULFATE 3.75; 3.75; 3.75; 3.75 MG/1; MG/1; MG/1; MG/1
15 CAPSULE, EXTENDED RELEASE ORAL EVERY MORNING
Qty: 30 CAPSULE | Refills: 0 | Status: SHIPPED | OUTPATIENT
Start: 2021-12-08 | End: 2022-01-17

## 2021-12-08 NOTE — TELEPHONE ENCOUNTER
Patient called requesting refill for:    Amphetamine-Dextroamphet ER (ADDERALL XR) 10 MG Oral Capsule SR 24 Hr    [facility] #[id number] Raji Ghosh, [address] [address], [phone], [phone]    Per patient DS was niles

## 2021-12-08 NOTE — TELEPHONE ENCOUNTER
Pt states the XR is not lasting longer than the regular dose    PT states if he could go up to 15mg he would like to try    Pt states his stomach has adjusted to the medication and is feeling pretty good.

## 2021-12-20 RX ORDER — NAPROXEN 500 MG/1
500 TABLET ORAL 2 TIMES DAILY WITH MEALS
Qty: 60 TABLET | Refills: 3 | Status: SHIPPED | OUTPATIENT
Start: 2021-12-20

## 2022-01-17 RX ORDER — DEXTROAMPHETAMINE SACCHARATE, AMPHETAMINE ASPARTATE MONOHYDRATE, DEXTROAMPHETAMINE SULFATE AND AMPHETAMINE SULFATE 3.75; 3.75; 3.75; 3.75 MG/1; MG/1; MG/1; MG/1
15 CAPSULE, EXTENDED RELEASE ORAL EVERY MORNING
Qty: 30 CAPSULE | Refills: 0 | Status: SHIPPED | OUTPATIENT
Start: 2022-01-17 | End: 2022-02-16

## 2022-01-17 NOTE — TELEPHONE ENCOUNTER
Amphetamine-Dextroamphet ER 15 MG Oral Capsule SR 24 Hr    [facility] #[id number] Kodak Farmer, [address] [facility] [address], [phone], [phone]    Pt called for a refill on medication verified pharmacy     Thank you

## 2022-03-02 RX ORDER — DEXTROAMPHETAMINE SACCHARATE, AMPHETAMINE ASPARTATE MONOHYDRATE, DEXTROAMPHETAMINE SULFATE AND AMPHETAMINE SULFATE 3.75; 3.75; 3.75; 3.75 MG/1; MG/1; MG/1; MG/1
15 CAPSULE, EXTENDED RELEASE ORAL EVERY MORNING
Qty: 30 CAPSULE | Refills: 0 | Status: SHIPPED | OUTPATIENT
Start: 2022-03-02 | End: 2022-04-01

## 2022-03-02 NOTE — TELEPHONE ENCOUNTER
Pt only has one pill left and he has no refills.    LOV with DS 11/22/21  Amphetamine-Dextroamphet ER 15 MG Oral Capsule

## 2022-04-28 ENCOUNTER — TELEPHONE (OUTPATIENT)
Dept: FAMILY MEDICINE CLINIC | Facility: CLINIC | Age: 52
End: 2022-04-28

## 2022-04-28 NOTE — TELEPHONE ENCOUNTER
FYI:     PT COMING IN ON 5/3/22 FOR NURSE VISIT- PT WAS GOING TO [place] TO GET BLOOD WORK DONE BUT WOULD RATHER COME HERE. DID ADV W/PTS INSURANCE PREFERRED LAB IS [facility]. HE WILL CALL INSURANCE AND SEE COST DIFFERENCE.     THANK YOU    ** THIS WAS FOR THYROID

## 2022-04-28 NOTE — TELEPHONE ENCOUNTER
Routing to provider- pt schedule NV for thyroid labs    No orders in system- please advise what orders are needed    Last Labs 2019

## 2022-05-04 ENCOUNTER — NURSE ONLY (OUTPATIENT)
Dept: FAMILY MEDICINE CLINIC | Facility: CLINIC | Age: 52
End: 2022-05-04
Payer: COMMERCIAL

## 2022-05-04 DIAGNOSIS — Z00.00 ROUTINE HEALTH MAINTENANCE: Primary | ICD-10-CM

## 2022-05-04 LAB
ALBUMIN SERPL-MCNC: 4.5 G/DL (ref 3.4–5)
ALBUMIN/GLOB SERPL: 1.5 {RATIO} (ref 1–2)
ALP LIVER SERPL-CCNC: 88 U/L
ALT SERPL-CCNC: 47 U/L
ANION GAP SERPL CALC-SCNC: 6 MMOL/L (ref 0–18)
AST SERPL-CCNC: 23 U/L (ref 15–37)
BILIRUB SERPL-MCNC: 0.7 MG/DL (ref 0.1–2)
BUN BLD-MCNC: 13 MG/DL (ref 7–18)
CALCIUM BLD-MCNC: 9.3 MG/DL (ref 8.5–10.1)
CHLORIDE SERPL-SCNC: 105 MMOL/L (ref 98–112)
CHOLEST SERPL-MCNC: 157 MG/DL (ref ?–200)
CO2 SERPL-SCNC: 27 MMOL/L (ref 21–32)
CREAT BLD-MCNC: 0.99 MG/DL
ERYTHROCYTE [DISTWIDTH] IN BLOOD BY AUTOMATED COUNT: 12.8 %
FASTING PATIENT LIPID ANSWER: YES
FASTING STATUS PATIENT QL REPORTED: YES
GLOBULIN PLAS-MCNC: 3.1 G/DL (ref 2.8–4.4)
GLUCOSE BLD-MCNC: 123 MG/DL (ref 70–99)
HCT VFR BLD AUTO: 48.4 %
HDLC SERPL-MCNC: 28 MG/DL (ref 40–59)
HGB BLD-MCNC: 16.3 G/DL
LDLC SERPL CALC-MCNC: 83 MG/DL (ref ?–100)
MCH RBC QN AUTO: 29.9 PG (ref 26–34)
MCHC RBC AUTO-ENTMCNC: 33.7 G/DL (ref 31–37)
MCV RBC AUTO: 88.6 FL
NONHDLC SERPL-MCNC: 129 MG/DL (ref ?–130)
OSMOLALITY SERPL CALC.SUM OF ELEC: 287 MOSM/KG (ref 275–295)
PLATELET # BLD AUTO: 238 10(3)UL (ref 150–450)
POTASSIUM SERPL-SCNC: 4.4 MMOL/L (ref 3.5–5.1)
PROT SERPL-MCNC: 7.6 G/DL (ref 6.4–8.2)
RBC # BLD AUTO: 5.46 X10(6)UL
SODIUM SERPL-SCNC: 138 MMOL/L (ref 136–145)
TRIGL SERPL-MCNC: 278 MG/DL (ref 30–149)
TSI SER-ACNC: 2.85 MIU/ML (ref 0.36–3.74)
VLDLC SERPL CALC-MCNC: 44 MG/DL (ref 0–30)
WBC # BLD AUTO: 6.9 X10(3) UL (ref 4–11)

## 2022-05-04 PROCEDURE — 80053 COMPREHEN METABOLIC PANEL: CPT | Performed by: FAMILY MEDICINE

## 2022-05-04 PROCEDURE — 85027 COMPLETE CBC AUTOMATED: CPT | Performed by: FAMILY MEDICINE

## 2022-05-04 PROCEDURE — 80061 LIPID PANEL: CPT | Performed by: FAMILY MEDICINE

## 2022-05-04 PROCEDURE — 84443 ASSAY THYROID STIM HORMONE: CPT | Performed by: FAMILY MEDICINE

## 2022-05-04 RX ORDER — LEVOTHYROXINE SODIUM 0.12 MG/1
125 TABLET ORAL
Refills: 0 | COMMUNITY
Start: 2022-05-04

## 2022-05-04 RX ORDER — DEXTROAMPHETAMINE SACCHARATE, AMPHETAMINE ASPARTATE MONOHYDRATE, DEXTROAMPHETAMINE SULFATE AND AMPHETAMINE SULFATE 3.75; 3.75; 3.75; 3.75 MG/1; MG/1; MG/1; MG/1
15 CAPSULE, EXTENDED RELEASE ORAL EVERY MORNING
Qty: 30 CAPSULE | Refills: 0 | Status: CANCELLED | OUTPATIENT
Start: 2022-05-04 | End: 2022-06-03

## 2022-05-04 NOTE — PATIENT INSTRUCTIONS
Blood work drawn per orders in chart for   1 gold  1 purple  22g right AC . Patient wanted to make sure that Dr. Johnson Neighbours was aware that his synthroid dosage is Synthroid dose is 125 mcg.

## 2022-05-05 ENCOUNTER — TELEPHONE (OUTPATIENT)
Dept: FAMILY MEDICINE CLINIC | Facility: CLINIC | Age: 52
End: 2022-05-05

## 2022-05-05 RX ORDER — FENOFIBRATE 54 MG/1
54 TABLET ORAL DAILY
Qty: 90 TABLET | Refills: 2 | Status: SHIPPED | OUTPATIENT
Start: 2022-05-05 | End: 2022-08-03

## 2022-05-05 RX ORDER — DEXTROAMPHETAMINE SACCHARATE, AMPHETAMINE ASPARTATE MONOHYDRATE, DEXTROAMPHETAMINE SULFATE AND AMPHETAMINE SULFATE 3.75; 3.75; 3.75; 3.75 MG/1; MG/1; MG/1; MG/1
15 CAPSULE, EXTENDED RELEASE ORAL EVERY MORNING
Qty: 30 CAPSULE | Refills: 0 | Status: SHIPPED | OUTPATIENT
Start: 2022-05-05 | End: 2022-06-04

## 2022-05-05 RX ORDER — FENOFIBRATE 67 MG/1
67 CAPSULE ORAL DAILY
Qty: 30 CAPSULE | Refills: 3 | Status: CANCELLED | OUTPATIENT
Start: 2022-05-05 | End: 2022-06-04

## 2022-05-05 NOTE — TELEPHONE ENCOUNTER
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 His  kidney, liver function, blood sugar and thyroid were all normal. I'd like to start him on something for the TG called fenofibrate, to help bring those TG down, long term that can be a risk for coronary disease.  It's taken once a day and then we'd recheck his LIPIDS in 3-4 months

## 2022-05-05 NOTE — TELEPHONE ENCOUNTER
Pt was advised of results- verbalized understanding    Agreeable to starting Fenofibrate- not pended need medication dosage    Recall placed for lipid

## 2022-06-08 RX ORDER — LEVOTHYROXINE SODIUM 0.12 MG/1
125 TABLET ORAL
Qty: 90 TABLET | Refills: 2 | Status: SHIPPED | OUTPATIENT
Start: 2022-06-08 | End: 2022-09-06

## 2022-06-08 NOTE — TELEPHONE ENCOUNTER
LOV: 11/22/21   Last Refill: 5/4/22        Lab Results   Component Value Date    T4F 0.7 (L) 07/01/2019    TSH 2.850 05/04/2022

## 2022-06-08 NOTE — TELEPHONE ENCOUNTER
Patient requests refill    levothyroxine 125 MCG Oral Tab    [facility] [address] [facility]

## 2022-06-21 ENCOUNTER — APPOINTMENT (OUTPATIENT)
Dept: DERMATOLOGY | Age: 52
End: 2022-06-21

## 2022-07-25 RX ORDER — DEXTROAMPHETAMINE SACCHARATE, AMPHETAMINE ASPARTATE MONOHYDRATE, DEXTROAMPHETAMINE SULFATE AND AMPHETAMINE SULFATE 3.75; 3.75; 3.75; 3.75 MG/1; MG/1; MG/1; MG/1
15 CAPSULE, EXTENDED RELEASE ORAL EVERY MORNING
Qty: 30 CAPSULE | Refills: 0 | Status: SHIPPED | OUTPATIENT
Start: 2022-07-25 | End: 2022-08-24

## 2022-07-25 NOTE — TELEPHONE ENCOUNTER
PT CALLED AND ADV NEEDS REFILLS OF     Amphetamine-Dextroamphet ER 15 MG Oral Capsule SR 24 Hr    PT TOOK LAST PILL TODAY.     [address]

## 2022-07-25 NOTE — TELEPHONE ENCOUNTER
Routing to provider per protocol. Amphetamine-Dextroamphet ER 15 MG Oral Capsule SR 24 Hr  Last refilled on 5/5/22 for #30  with 0 rf. Last labs 5/4/22. Last seen on 11/22/21. No future appointments. Thank you. Patient took last pill today.

## 2022-08-30 ENCOUNTER — TELEPHONE (OUTPATIENT)
Dept: FAMILY MEDICINE CLINIC | Facility: CLINIC | Age: 52
End: 2022-08-30

## 2022-08-30 NOTE — TELEPHONE ENCOUNTER
Letter mailed to patient reminding him he has overdue orders. Orders to go to [facility] have been enclosed with letter.     Order Code Tests Ordered (Total: 1)    Order Code Tests Ordered      66402 COMP METABOLIC PANEL (14)         Order Code Tests Ordered (Total: 1)    Order Code Tests Ordered      7600 LIPID PANEL

## 2022-09-01 RX ORDER — DEXTROAMPHETAMINE SACCHARATE, AMPHETAMINE ASPARTATE MONOHYDRATE, DEXTROAMPHETAMINE SULFATE AND AMPHETAMINE SULFATE 3.75; 3.75; 3.75; 3.75 MG/1; MG/1; MG/1; MG/1
15 CAPSULE, EXTENDED RELEASE ORAL EVERY MORNING
Qty: 30 CAPSULE | Refills: 0 | Status: SHIPPED | OUTPATIENT
Start: 2022-09-01 | End: 2022-10-01

## 2022-09-01 NOTE — TELEPHONE ENCOUNTER
PT CALLED AND ADV NEEDS REFILL OF     Amphetamine-Dextroamphet ER 15 MG Oral Capsule SR 24 Hr    PLEASE SEND TO [facility] [address]      THANK YOU

## 2022-09-30 ENCOUNTER — TELEPHONE (OUTPATIENT)
Dept: FAMILY MEDICINE CLINIC | Facility: CLINIC | Age: 52
End: 2022-09-30

## 2022-09-30 NOTE — TELEPHONE ENCOUNTER
Amphetamine-Dextroamphet ER 15 MG Oral Capsule SR 24 Hr      Pt would like sent to   [facility] [address] [facility], [address] [place] [address],   [facility] [address] [facility], [phone], [phone]

## 2022-10-01 RX ORDER — DEXTROAMPHETAMINE SACCHARATE, AMPHETAMINE ASPARTATE MONOHYDRATE, DEXTROAMPHETAMINE SULFATE AND AMPHETAMINE SULFATE 3.75; 3.75; 3.75; 3.75 MG/1; MG/1; MG/1; MG/1
15 CAPSULE, EXTENDED RELEASE ORAL EVERY MORNING
Qty: 30 CAPSULE | Refills: 0 | Status: SHIPPED | OUTPATIENT
Start: 2022-10-01 | End: 2022-10-31

## 2022-10-25 RX ORDER — DEXTROAMPHETAMINE SACCHARATE, AMPHETAMINE ASPARTATE MONOHYDRATE, DEXTROAMPHETAMINE SULFATE AND AMPHETAMINE SULFATE 3.75; 3.75; 3.75; 3.75 MG/1; MG/1; MG/1; MG/1
15 CAPSULE, EXTENDED RELEASE ORAL EVERY MORNING
Qty: 30 CAPSULE | Refills: 0 | Status: SHIPPED | OUTPATIENT
Start: 2022-10-25 | End: 2022-11-24

## 2022-10-25 NOTE — TELEPHONE ENCOUNTER
Routing to provider per protocol. Amphetamine-Dextroamphet ER 15 MG Oral Capsule SR 24 Hr  Last refilled on 10/1/22 for #30  with 0 rf. Last labs 5/4/22. Last seen on 11/22/21. No future appointments. Thank you.

## 2022-11-21 RX ORDER — DEXTROAMPHETAMINE SACCHARATE, AMPHETAMINE ASPARTATE MONOHYDRATE, DEXTROAMPHETAMINE SULFATE AND AMPHETAMINE SULFATE 3.75; 3.75; 3.75; 3.75 MG/1; MG/1; MG/1; MG/1
15 CAPSULE, EXTENDED RELEASE ORAL EVERY MORNING
Qty: 30 CAPSULE | Refills: 0 | Status: SHIPPED | OUTPATIENT
Start: 2022-11-21 | End: 2022-12-21

## 2022-11-21 NOTE — TELEPHONE ENCOUNTER
Amphetamine-Dextroamphet ER 15 MG Oral Capsule SR 24 Hr    Pt is going out of town on Saturday would like to have refilled prior to leaving.  Thank you       [facility]   [facility] [address] [facility], [address] [facility] 70, [phone], [phone]

## 2022-11-21 NOTE — TELEPHONE ENCOUNTER
Routing to provider per protocol. Amphetamine-Dextroamphet ER 15 MG Oral Capsule SR 24 Hr  Last refilled on 10/25/22 for #30  with 0 rf. Last labs 5/4/22. Last seen on 11/22/21. No future appointments. Thank you.

## 2022-11-25 ENCOUNTER — TELEPHONE (OUTPATIENT)
Dept: FAMILY MEDICINE CLINIC | Facility: CLINIC | Age: 52
End: 2022-11-25

## 2022-11-25 NOTE — TELEPHONE ENCOUNTER
Pt called he is needing to  his script for   Amphetamine-Dextroamphet ER 15 MG Oral Capsule SR 24 Hr    Per pharmacy he is unable to get script until the 27th unless dr's office calls with reason why he is getting it sooner. Can we please call pharmacy for pt as he is going out of town today?

## 2022-11-25 NOTE — TELEPHONE ENCOUNTER
Pharmacist notified and verbalized understanding. Per pharmacist patient is at pharmacy. She will notify patient.

## 2023-01-11 RX ORDER — DEXTROAMPHETAMINE SACCHARATE, AMPHETAMINE ASPARTATE MONOHYDRATE, DEXTROAMPHETAMINE SULFATE AND AMPHETAMINE SULFATE 3.75; 3.75; 3.75; 3.75 MG/1; MG/1; MG/1; MG/1
15 CAPSULE, EXTENDED RELEASE ORAL EVERY MORNING
Qty: 30 CAPSULE | Refills: 0 | Status: SHIPPED | OUTPATIENT
Start: 2023-01-11 | End: 2023-02-10

## 2023-01-11 NOTE — TELEPHONE ENCOUNTER
Routing to provider per protocol. Amphetamine-Dextroamphet ER 15 MG Oral Capsule SR 24 Hr  Last refilled on 11/21/22 for #30  with 0 rf. Last labs 5/4/22. Last seen on 11/22/21. No future appointments. Thank you.

## 2023-01-11 NOTE — TELEPHONE ENCOUNTER
Patient requests refill    Amphetamine-Dextroamphet ER 15 MG Oral Capsule SR 24 Hr    [facility]

## 2023-02-20 RX ORDER — DEXTROAMPHETAMINE SACCHARATE, AMPHETAMINE ASPARTATE MONOHYDRATE, DEXTROAMPHETAMINE SULFATE AND AMPHETAMINE SULFATE 3.75; 3.75; 3.75; 3.75 MG/1; MG/1; MG/1; MG/1
15 CAPSULE, EXTENDED RELEASE ORAL EVERY MORNING
Qty: 30 CAPSULE | Refills: 0 | Status: SHIPPED | OUTPATIENT
Start: 2023-02-20 | End: 2023-03-22

## 2023-03-08 NOTE — TELEPHONE ENCOUNTER
Pt called to request a script for the following medication be filled. He is experiencing pain in his neck and stated Dr Olivera Fearing has filled it in the past for the pain.   Naproxen 500 MG Oral Tab   [facility], [address] [facility] [address] [Dear  ___] : Dear  [unfilled], [Courtesy Letter:] : I had the pleasure of seeing your patient, [unfilled], in my office today. [Please see my note below.] : Please see my note below. [Sincerely,] : Sincerely, [DrChano  ___] : Dr. REMY [DrChano ___] : Dr. REMY [FreeTextEntry3] : Elvis Aj MD

## 2023-03-17 NOTE — TELEPHONE ENCOUNTER
Last OV 11-  Last lab 5/4/22 tsh 2.850  Last refilled 6/8/22  #90  2 refills      Thyroid Supplements Protocol Failed 03/17/2023 04:27 PM   Protocol Details  Appointment in past 12 or next 3 months    TSH test in past 12 months    TSH value between 0.350 and 5.500 IU/ml

## 2023-03-18 RX ORDER — LEVOTHYROXINE SODIUM 0.12 MG/1
125 TABLET ORAL
Qty: 90 TABLET | Refills: 2 | Status: SHIPPED | OUTPATIENT
Start: 2023-03-18 | End: 2023-06-16

## 2023-04-04 RX ORDER — DEXTROAMPHETAMINE SACCHARATE, AMPHETAMINE ASPARTATE MONOHYDRATE, DEXTROAMPHETAMINE SULFATE AND AMPHETAMINE SULFATE 3.75; 3.75; 3.75; 3.75 MG/1; MG/1; MG/1; MG/1
15 CAPSULE, EXTENDED RELEASE ORAL EVERY MORNING
Qty: 30 CAPSULE | Refills: 0 | Status: SHIPPED | OUTPATIENT
Start: 2023-04-04 | End: 2023-05-04

## 2023-04-04 NOTE — TELEPHONE ENCOUNTER
[facility] [address] [facility], [address] [facility],   [facility], [phone], [facility] [address]   Phone: [phone] Fax: [phone]   Hours: Not open 24 hours   [address], [address] [phone], [phone]   [address] [place] [address]   Phone: [phone] Fax: [phone]   Hours: Not open 24 hours         PATIENT IS REQUESTING REFILL FOR HIS ADDERALL. HE PREFERS PRESCRIPTION REFILL TO BE SENT TO [facility], BUT IF NOT THE [facility] IS HIS PHARMACY.

## 2023-05-16 RX ORDER — DEXTROAMPHETAMINE SACCHARATE, AMPHETAMINE ASPARTATE MONOHYDRATE, DEXTROAMPHETAMINE SULFATE AND AMPHETAMINE SULFATE 3.75; 3.75; 3.75; 3.75 MG/1; MG/1; MG/1; MG/1
15 CAPSULE, EXTENDED RELEASE ORAL EVERY MORNING
Qty: 30 CAPSULE | Refills: 0 | Status: SHIPPED | OUTPATIENT
Start: 2023-05-16 | End: 2023-06-15

## 2023-05-16 NOTE — TELEPHONE ENCOUNTER
Shanna Contreras requesting Medication Refill for:  Amphetamine-Dextroamphet ER 15 MG Oral Capsule SR 24 Hr    LOV: 11/22/21   Last Refill date: 4/4/23  Pharmacy: [facility] [phone] [facility] [address]  Next Scheduled appointment: n/a

## 2023-06-29 RX ORDER — DEXTROAMPHETAMINE SACCHARATE, AMPHETAMINE ASPARTATE MONOHYDRATE, DEXTROAMPHETAMINE SULFATE AND AMPHETAMINE SULFATE 3.75; 3.75; 3.75; 3.75 MG/1; MG/1; MG/1; MG/1
15 CAPSULE, EXTENDED RELEASE ORAL EVERY MORNING
Qty: 30 CAPSULE | Refills: 0 | Status: SHIPPED | OUTPATIENT
Start: 2023-06-29 | End: 2023-07-29

## 2023-09-06 RX ORDER — DEXTROAMPHETAMINE SACCHARATE, AMPHETAMINE ASPARTATE MONOHYDRATE, DEXTROAMPHETAMINE SULFATE AND AMPHETAMINE SULFATE 3.75; 3.75; 3.75; 3.75 MG/1; MG/1; MG/1; MG/1
15 CAPSULE, EXTENDED RELEASE ORAL EVERY MORNING
Qty: 30 CAPSULE | Refills: 0 | Status: SHIPPED | OUTPATIENT
Start: 2023-09-06 | End: 2023-10-06

## 2023-09-30 ENCOUNTER — HOSPITAL ENCOUNTER (OUTPATIENT)
Age: 53
Discharge: HOME | End: 2023-09-30
Payer: COMMERCIAL

## 2023-09-30 VITALS
SYSTOLIC BLOOD PRESSURE: 136 MMHG | RESPIRATION RATE: 18 BRPM | BODY MASS INDEX: 37.03 KG/M2 | HEART RATE: 108 BPM | HEIGHT: 69 IN | TEMPERATURE: 97 F | WEIGHT: 250 LBS | OXYGEN SATURATION: 97 % | DIASTOLIC BLOOD PRESSURE: 85 MMHG

## 2023-09-30 DIAGNOSIS — S61.219A LACERATION OF FINGER OF LEFT HAND WITHOUT FOREIGN BODY, NAIL DAMAGE STATUS UNSPECIFIED, UNSPECIFIED FINGER, INITIAL ENCOUNTER: Primary | ICD-10-CM

## 2023-09-30 NOTE — DISCHARGE INSTRUCTIONS
Return to the [facility] if worse or any concerns  Follow up with your primary care physician in 3 days for reevaluation and wound check. Suture removal in 10-14days  Keep wound clean and dry. Apply triple ointment antibiotic to the affected area twice a day  Scar may remain, keep scar out of the sun.

## 2023-11-07 RX ORDER — DEXTROAMPHETAMINE SACCHARATE, AMPHETAMINE ASPARTATE MONOHYDRATE, DEXTROAMPHETAMINE SULFATE AND AMPHETAMINE SULFATE 3.75; 3.75; 3.75; 3.75 MG/1; MG/1; MG/1; MG/1
15 CAPSULE, EXTENDED RELEASE ORAL EVERY MORNING
Qty: 30 CAPSULE | Refills: 0 | Status: SHIPPED | OUTPATIENT
Start: 2023-11-07 | End: 2023-12-07

## 2023-11-07 NOTE — TELEPHONE ENCOUNTER
Adderall [facility] [address]. Pt wants to know if Dr Ashlyn Urias will put him back on citalopram. Pt is out of the adderall.

## 2023-12-13 NOTE — TELEPHONE ENCOUNTER
Routing to provider per protocol. Amphetamine-Dextroamphet ER 15 MG Oral Capsule SR 24 Hr   Last refilled on 9/6/23 for #30  with 0 rf. Last labs 5/4/22. Last seen on 11/22/21. No future appointments. Thank you. Spoke with patient, advised patient DS out of office and needs OV to discuss citalopram as patient has not been seen since 2021. Patient states he will call back to schedule appt. Quality 226: Preventive Care And Screening: Tobacco Use: Screening And Cessation Intervention: Patient screened for tobacco use and is an ex/non-smoker Detail Level: Detailed Quality 431: Preventive Care And Screening: Unhealthy Alcohol Use - Screening: Patient not identified as an unhealthy alcohol user when screened for unhealthy alcohol use using a systematic screening method Quality 110: Preventive Care And Screening: Influenza Immunization: Influenza Immunization Administered during Influenza season

## 2023-12-19 LAB
AMB EXT BILIRUBIN URINE: NEGATIVE
AMB EXT GLUCOSE URINE: NEGATIVE
AMB EXT HEMATOCRIT: 48.8
AMB EXT HEMOGLOBIN: 16.9
AMB EXT KETONES URINE: NEGATIVE
AMB EXT MCV: 86.7
AMB EXT NITRITE URINE: NEGATIVE
AMB EXT PLATELETS: 266
AMB EXT TSH: 1.51 MIU/ML
AMB EXT URINE SPECIFIC GRAVITY: 1.02
AMB EXT WBC: 9.1 X10(3)UL

## 2024-01-11 ENCOUNTER — TELEPHONE (OUTPATIENT)
Dept: FAMILY MEDICINE CLINIC | Facility: CLINIC | Age: 54
End: 2024-01-11

## 2024-01-11 ENCOUNTER — MED REC SCAN ONLY (OUTPATIENT)
Dept: FAMILY MEDICINE CLINIC | Facility: CLINIC | Age: 54
End: 2024-01-11

## 2024-01-11 ENCOUNTER — OFFICE VISIT (OUTPATIENT)
Dept: FAMILY MEDICINE CLINIC | Facility: CLINIC | Age: 54
End: 2024-01-11
Payer: COMMERCIAL

## 2024-01-11 VITALS
SYSTOLIC BLOOD PRESSURE: 128 MMHG | HEART RATE: 82 BPM | DIASTOLIC BLOOD PRESSURE: 88 MMHG | HEIGHT: 68.5 IN | TEMPERATURE: 98 F | BODY MASS INDEX: 38.24 KG/M2 | OXYGEN SATURATION: 97 % | RESPIRATION RATE: 12 BRPM | WEIGHT: 255.25 LBS

## 2024-01-11 DIAGNOSIS — R20.2 PARESTHESIA AND PAIN OF BOTH UPPER EXTREMITIES: ICD-10-CM

## 2024-01-11 DIAGNOSIS — Z12.11 COLON CANCER SCREENING: ICD-10-CM

## 2024-01-11 DIAGNOSIS — R20.2 BILATERAL LEG PARESTHESIA: ICD-10-CM

## 2024-01-11 DIAGNOSIS — M48.02 STENOSIS OF CERVICAL SPINE WITH MYELOPATHY (HCC): ICD-10-CM

## 2024-01-11 DIAGNOSIS — Z00.00 ROUTINE HEALTH MAINTENANCE: ICD-10-CM

## 2024-01-11 DIAGNOSIS — R33.9 URINARY RETENTION: ICD-10-CM

## 2024-01-11 DIAGNOSIS — M79.601 PARESTHESIA AND PAIN OF BOTH UPPER EXTREMITIES: ICD-10-CM

## 2024-01-11 DIAGNOSIS — Z12.5 PROSTATE CANCER SCREENING: Primary | ICD-10-CM

## 2024-01-11 DIAGNOSIS — M79.602 PARESTHESIA AND PAIN OF BOTH UPPER EXTREMITIES: ICD-10-CM

## 2024-01-11 DIAGNOSIS — G99.2 STENOSIS OF CERVICAL SPINE WITH MYELOPATHY (HCC): ICD-10-CM

## 2024-01-11 LAB
Lab: 9.4 MCG/DL (ref 6.1–12.2)
T3, TOTAL: 134

## 2024-01-11 PROCEDURE — 99396 PREV VISIT EST AGE 40-64: CPT | Performed by: FAMILY MEDICINE

## 2024-01-11 PROCEDURE — 3008F BODY MASS INDEX DOCD: CPT | Performed by: FAMILY MEDICINE

## 2024-01-11 PROCEDURE — 3079F DIAST BP 80-89 MM HG: CPT | Performed by: FAMILY MEDICINE

## 2024-01-11 PROCEDURE — 3074F SYST BP LT 130 MM HG: CPT | Performed by: FAMILY MEDICINE

## 2024-01-11 RX ORDER — LEVOTHYROXINE SODIUM 125 MCG
125 TABLET ORAL NIGHTLY
COMMUNITY
Start: 2024-01-03

## 2024-01-11 RX ORDER — CITALOPRAM 20 MG/1
20 TABLET ORAL DAILY
Qty: 90 TABLET | Refills: 3 | Status: SHIPPED | OUTPATIENT
Start: 2024-01-11

## 2024-01-12 ENCOUNTER — TELEPHONE (OUTPATIENT)
Dept: FAMILY MEDICINE CLINIC | Facility: CLINIC | Age: 54
End: 2024-01-12

## 2024-01-12 NOTE — TELEPHONE ENCOUNTER
----- Message from Rui Garcia DO sent at 1/12/2024  9:12 AM CST -----  Please notify Carmelo that his thyroid labs looked good, keep the dose the same , and schedule MRI. Recheck TSH in 1 year

## 2024-01-14 LAB
ALBUMIN/GLOBULIN RATIO: 2.5 (CALC) (ref 1–2.5)
ALBUMIN: 5 G/DL (ref 3.6–5.1)
ALKALINE PHOSPHATASE: 77 U/L (ref 35–144)
ALT: 29 U/L (ref 9–46)
APPEARANCE: CLEAR
AST: 19 U/L (ref 10–35)
BILIRUBIN, TOTAL: 0.3 MG/DL (ref 0.2–1.2)
BILIRUBIN: NEGATIVE
BUN: 12 MG/DL (ref 7–25)
CALCIUM: 10 MG/DL (ref 8.6–10.3)
CARBON DIOXIDE: 27 MMOL/L (ref 20–32)
CHLORIDE: 105 MMOL/L (ref 98–110)
CHOL/HDLC RATIO: 4.2 (CALC)
CHOLESTEROL, TOTAL: 139 MG/DL
COLOR: YELLOW
CREATININE: 0.97 MG/DL (ref 0.7–1.3)
EGFR: 95 ML/MIN/1.73M2
GLOBULIN: 2 G/DL (CALC) (ref 1.9–3.7)
GLUCOSE: 116 MG/DL (ref 65–99)
GLUCOSE: NEGATIVE
HDL CHOLESTEROL: 33 MG/DL
KETONES: NEGATIVE
LDL-CHOLESTEROL: 80 MG/DL (CALC)
LEUKOCYTE ESTERASE: NEGATIVE
NITRITE: NEGATIVE
NON-HDL CHOLESTEROL: 106 MG/DL (CALC)
OCCULT BLOOD: NEGATIVE
PH: 5.5 (ref 5–8)
POTASSIUM: 4.7 MMOL/L (ref 3.5–5.3)
PROTEIN, TOTAL: 7 G/DL (ref 6.1–8.1)
PROTEIN: NEGATIVE
PSA, TOTAL: 1.38 NG/ML
SODIUM: 141 MMOL/L (ref 135–146)
SPECIFIC GRAVITY: 1.02 (ref 1–1.03)
TRIGLYCERIDES: 161 MG/DL

## 2024-01-18 ENCOUNTER — TELEPHONE (OUTPATIENT)
Dept: FAMILY MEDICINE CLINIC | Facility: CLINIC | Age: 54
End: 2024-01-18

## 2024-01-18 NOTE — TELEPHONE ENCOUNTER
Spoke with patient - given Dr. Garcia's recommendations:  Please notify Carmelo his thyroid, blood count, cholesterol, thyroid, prostate, kidney and liver function tests looked good, his BS was up a bit, but better than last time.  His urine shows, he has calcium oxalate crystals, which can predispose him to Kidney stones. So make sure he drinks enough water, and add some orange juice, or tomato juice, or lemonade to his daily fluid intake, the acidity in those things helps to dissolve the crystals in his urine, wont guarantee that he can;t get a kidney stone , but it may help to prevent new ones    Pt verbalized understanding.     Pt received a [facility] message yesterday - confused at to what the message meant.    A1C order for [facility]?

## 2024-01-18 NOTE — TELEPHONE ENCOUNTER
Spoke with patient - did see the scanned document with the labs from [facility].  Explained the \"Circled\" numbers were all in normal range.  Dr. Garcia does want him to have an additional test with [facility].  HA1C was ordered and faxed to [facility].    [employer] message was also sent to pt to remind to have lab done - per pt request.

## 2024-01-18 NOTE — TELEPHONE ENCOUNTER
----- Message from Rui Garcia DO sent at 1/18/2024  8:08 AM CST -----  Please notify Carmelo his thyroid, blood count, cholesterol, thyroid, prostate, kidney and liver function tests looked good, his BS was up a bit, but better than last time.  His urine shows, he has calcium oxalate crystals, which can predispose him to Kidney stones. So make sure he drinks enough water, and add some orange juice, or tomato juice, or lemonade to his daily fluid intake, the acidity in those things helps to dissolve the crystals in his urine, wont guarantee that he can;t get a kidney stone , but it may help to prevent new ones

## 2024-01-19 ENCOUNTER — HOSPITAL ENCOUNTER (OUTPATIENT)
Dept: MRI IMAGING | Facility: HOSPITAL | Age: 54
Discharge: HOME | End: 2024-01-19
Attending: FAMILY MEDICINE
Payer: COMMERCIAL

## 2024-01-19 DIAGNOSIS — R20.2 PARESTHESIA AND PAIN OF BOTH UPPER EXTREMITIES: ICD-10-CM

## 2024-01-19 DIAGNOSIS — G99.2 STENOSIS OF CERVICAL SPINE WITH MYELOPATHY (HCC): ICD-10-CM

## 2024-01-19 DIAGNOSIS — M79.602 PARESTHESIA AND PAIN OF BOTH UPPER EXTREMITIES: ICD-10-CM

## 2024-01-19 DIAGNOSIS — M79.601 PARESTHESIA AND PAIN OF BOTH UPPER EXTREMITIES: ICD-10-CM

## 2024-01-19 DIAGNOSIS — M48.02 STENOSIS OF CERVICAL SPINE WITH MYELOPATHY (HCC): ICD-10-CM

## 2024-01-19 DIAGNOSIS — R20.2 BILATERAL LEG PARESTHESIA: ICD-10-CM

## 2024-01-19 DIAGNOSIS — R33.9 URINARY RETENTION: ICD-10-CM

## 2024-01-19 PROCEDURE — 72141 MRI NECK SPINE W/O DYE: CPT | Performed by: FAMILY MEDICINE

## 2024-01-20 DIAGNOSIS — M48.02 STENOSIS OF CERVICAL SPINE WITH MYELOPATHY (HCC): Primary | ICD-10-CM

## 2024-01-20 DIAGNOSIS — G99.2 STENOSIS OF CERVICAL SPINE WITH MYELOPATHY (HCC): Primary | ICD-10-CM

## 2024-01-22 ENCOUNTER — TELEPHONE (OUTPATIENT)
Dept: FAMILY MEDICINE CLINIC | Facility: CLINIC | Age: 54
End: 2024-01-22

## 2024-01-22 DIAGNOSIS — M79.601 PARESTHESIA AND PAIN OF BOTH UPPER EXTREMITIES: ICD-10-CM

## 2024-01-22 DIAGNOSIS — R20.2 PARESTHESIA AND PAIN OF BOTH UPPER EXTREMITIES: ICD-10-CM

## 2024-01-22 DIAGNOSIS — G99.2 STENOSIS OF CERVICAL SPINE WITH MYELOPATHY (HCC): Primary | ICD-10-CM

## 2024-01-22 DIAGNOSIS — M48.02 STENOSIS OF CERVICAL SPINE WITH MYELOPATHY (HCC): Primary | ICD-10-CM

## 2024-01-22 DIAGNOSIS — M79.602 PARESTHESIA AND PAIN OF BOTH UPPER EXTREMITIES: ICD-10-CM

## 2024-01-22 NOTE — TELEPHONE ENCOUNTER
PT CALLED AND ADV RECEIVED MESSAGE ABOUT MRI RESULTS. PT WOULD LIKE CLARIFICATION - WAS PHONE NUMBER PROVIDED IN MRI RESULTS FOR PHYSICAL THERAPY OR FOR PAIN MANAGEMENT.    PROVIDED NUMBER FROM PT, ADV PT THAT NUMBER MAY BE FOR PAIN MGMT - ADV PT THAT THAT HAS BEEN AUTHORIZED.    PLEASE CALL AND CLARIFY    THANK YOU

## 2024-01-22 NOTE — TELEPHONE ENCOUNTER
Pt states that PT told him that the wrong referral was in ? They said it was for a 1 time pre- surgical visit?    Rn can see that on the PT referral it is pended for 8 visits, the Pain management order has only 1 visit?  Maybe they are seeing a different order    Patient is going to call them back to discuss. RN advised that if they need to speak to the office directly they can give us a call

## 2024-01-22 NOTE — TELEPHONE ENCOUNTER
RN Spoke to pt and advised that pt needs to see pain management and do physical therapy    Pt was provided with phone numbers to both locations- verbalized understanding

## 2024-01-23 ENCOUNTER — PATIENT MESSAGE (OUTPATIENT)
Dept: FAMILY MEDICINE CLINIC | Facility: CLINIC | Age: 54
End: 2024-01-23

## 2024-01-30 LAB — AMB EXT COLOGUARD RESULT: NEGATIVE

## 2024-01-31 ENCOUNTER — TELEPHONE (OUTPATIENT)
Dept: FAMILY MEDICINE CLINIC | Facility: CLINIC | Age: 54
End: 2024-01-31

## 2024-01-31 NOTE — TELEPHONE ENCOUNTER
Provider advised- RN could not bring anything up in care everywhere    Dr. Garcia will review once it is recieved

## 2024-01-31 NOTE — TELEPHONE ENCOUNTER
PATIENT IS SCHEDULED WITH DR STERN TOMORROW. HE SAYS BACK IN 2013 HE SAW A UROLOGIST. PATIENT WILL BE STARTING PT FOR HIS BACK PAIN. HE REMEMBERS WHEN HE SAW THE UROLOGIST, HE REFERRED PATIENT TO NEURO. PATIENT SAW NEURO BUT DID NOT CONTINUE. PATIENT WANTS TO PICK DR STERN BRAIN AND SEE IF DR STERN FEELS THE BACK BACK COULD BE STEMMING FROM SOMETHING ELSE. PATIENT HAD SOME TESTS DONE BACK IN 2013 AT [facility]. HE HAS HARDCOPY FOR DR STERN TO REVIEW. I CANNOT SEE ANYTHING FROM 2013. PATIENT IS WILLING TO DROP OFF PAPERWORK FOR REVIEW PRIOR TO VISIT TOMORROW. HE WILL COME TO OFFICE BY 1600 AND DROP OFF, UNLESS DR STERN AND NURSE CAN SEE THESE. I INFORMED PATIENT TO COME IN, BUT WILL CALL HIM IF NOT NECESSARY

## 2024-02-01 ENCOUNTER — OFFICE VISIT (OUTPATIENT)
Dept: FAMILY MEDICINE CLINIC | Facility: CLINIC | Age: 54
End: 2024-02-01
Payer: COMMERCIAL

## 2024-02-01 VITALS
SYSTOLIC BLOOD PRESSURE: 128 MMHG | TEMPERATURE: 98 F | OXYGEN SATURATION: 95 % | HEART RATE: 99 BPM | BODY MASS INDEX: 39 KG/M2 | DIASTOLIC BLOOD PRESSURE: 76 MMHG | WEIGHT: 262 LBS | RESPIRATION RATE: 16 BRPM

## 2024-02-01 DIAGNOSIS — M54.50 LOW BACK PAIN OF OVER 3 MONTHS DURATION: ICD-10-CM

## 2024-02-01 DIAGNOSIS — N31.9 NEUROGENIC DYSFUNCTION OF THE URINARY BLADDER: ICD-10-CM

## 2024-02-01 DIAGNOSIS — R33.9 URINARY RETENTION: Primary | ICD-10-CM

## 2024-02-01 DIAGNOSIS — M51.27 HERNIATED NUCLEUS PULPOSUS, L5-S1, RIGHT: ICD-10-CM

## 2024-02-01 PROCEDURE — 99215 OFFICE O/P EST HI 40 MIN: CPT | Performed by: FAMILY MEDICINE

## 2024-02-01 NOTE — PROGRESS NOTES
Carmelo Covington is a 53 year old male.  HPI:   Carmelo is her for follow up on his neck and arm, and lower back. He was also having issues with his bladder and urination, he did see a urologist . aSlma Suarez who did a urine study and suggested he see a neurologist, he did see someone but does not recall the name, thinks he had an EMG done. But does not have any record of it. [facility]  and a diagnosis for his symptoms.  His current concerns revolve around his bladder issues, low back issues , with pain and paresthesia in his legs and the problems with his  neck  and arm  pain and parsthesia. He had a recent MRI of the cervical spine, has not had an MRI OF his lumbar spine since 2019. Showed compression of the neuroforamen at L4-5 on the left, and L5-S1 on the right with some degenerative changes present.     Current Outpatient Medications   Medication Sig Dispense Refill    SYNTHROID 125 MCG Oral Tab Take 1 tablet (125 mcg total) by mouth nightly.      citalopram 20 MG Oral Tab Take 1 tablet (20 mg total) by mouth daily. 90 tablet 3    Amphetamine-Dextroamphet ER 15 MG Oral Capsule SR 24 Hr Take 1 capsule (15 mg total) by mouth every morning. 30 capsule 0    famotidine 20 MG Oral Tab Take 1 tablet (20 mg total) by mouth daily.      Fenofibrate 54 MG Oral Tab Take 1 tablet (54 mg total) by mouth daily. (Patient not taking: Reported on 1/11/2024) 90 tablet 2    naproxen 500 MG Oral Tab Take 1 tablet (500 mg total) by mouth 2 (two) times daily with meals. (Patient not taking: Reported on 1/11/2024) 60 tablet 3      Past Medical History:   Diagnosis Date    Carpal tunnel syndrome on both sides     Hypothyroid       Social History:  Social History     Socioeconomic History    Marital status:    Tobacco Use    Smoking status: Former     Packs/day: 1     Types: Cigarettes    Smokeless tobacco: Never   Vaping Use    Vaping Use: Some days    Substances: Nicotine     Devices: Disposable   Substance and Sexual Activity    Alcohol use: Yes     Alcohol/week: 6.0 standard drinks of alcohol     Types: 6 Standard drinks or equivalent per week     Comment: Social    Drug use: No        REVIEW OF SYSTEMS:   GENERAL HEALTH: feels well otherwise  SKIN: denies any unusual skin lesions or rashes  RESPIRATORY: denies shortness of breath with exertion  CARDIOVASCULAR: denies chest pain on exertion  GI: denies abdominal pain and denies heartburn  NEURO: denies headaches, has cervical radiculopathy of both arms, paresthesia in both legs with some noted weakness   Bladder dysfucntion  EXAM:   /76   Pulse 99   Temp 98.2 °F (36.8 °C) (Temporal)   Resp 16   Wt 262 lb (118.8 kg)   SpO2 95%   BMI 39.26 kg/m²   GENERAL: well developed, well nourished,in no apparent distress  SKIN: no rashes,no suspicious lesions  HEENT: atraumatic, normocephalic,ears and throat are clear  NECK: supple,no adenopathy,no bruits, positive pain and radiculopathy into both  arms  LUNGS: clear to auscultation  CARDIO: RRR without murmur  GI: good BS's,no masses, HSM or tenderness  : has bladder issues retention, and flow  EXTREMITIES: no cyanosis, clubbing or edema  LUMBAR: has reproducible pain over the lumbar paraspinals, noted weakness with dorsiflexion and hip flexion  ASSESSMENT AND PLAN:     Encounter Diagnoses   Name Primary?    Urinary retention Yes    Low back pain of over 3 months duration     Herniated nucleus pulposus, L5-S1, right     Neurogenic dysfunction of the urinary bladder        No orders of the defined types were placed in this encounter.      Meds & Refills for this Visit:  Requested Prescriptions      No prescriptions requested or ordered in this encounter       Imaging & Consults:  UROLOGY - INTERNAL  MRI SPINE LUMBAR (CPT=72148)     The patient indicates understanding of these issues and agrees to the plan.  The patient is asked to return in after we get his MRI Lumbar, SUggest he  follow up with Dr. Marsh who did his first Urodynamic study. Proceed with PT for his neck and shoulders. Spent 50 min with the patient in review of his  previous studies reviewing his chart , examination and recommendation

## 2024-02-08 DIAGNOSIS — F90.0 ATTENTION DEFICIT HYPERACTIVITY DISORDER (ADHD), PREDOMINANTLY INATTENTIVE TYPE: ICD-10-CM

## 2024-02-08 NOTE — TELEPHONE ENCOUNTER
Non-Narcotic Pain Medication Protocol [id number]/08/2024 01:48 PM   Protocol Details In person appointment or virtual visit in the past 6 mos or appointment in next 3 mos        Controlled Substance Medication Kgeprd7702/08/2024 01:48 PM    This medication is a controlled substance - forward to provider to refill     Last OV:02/01/2024, sick, 01/11/2024 physical   Last refill: Adderall 01/03/2024. 30 cap, 0 refill                  Naproxen 12/20/2021 60 tabs, 3 refill     Medication pended, please sign if appropriate

## 2024-02-09 RX ORDER — DEXTROAMPHETAMINE SACCHARATE, AMPHETAMINE ASPARTATE MONOHYDRATE, DEXTROAMPHETAMINE SULFATE AND AMPHETAMINE SULFATE 3.75; 3.75; 3.75; 3.75 MG/1; MG/1; MG/1; MG/1
15 CAPSULE, EXTENDED RELEASE ORAL EVERY MORNING
Qty: 30 CAPSULE | Refills: 0 | Status: SHIPPED | OUTPATIENT
Start: 2024-02-09 | End: 2024-03-10

## 2024-02-09 RX ORDER — NAPROXEN 500 MG/1
500 TABLET ORAL 2 TIMES DAILY WITH MEALS
Qty: 60 TABLET | Refills: 3 | Status: SHIPPED | OUTPATIENT
Start: 2024-02-09

## 2024-02-15 ENCOUNTER — TELEPHONE (OUTPATIENT)
Dept: PHYSICAL THERAPY | Facility: HOSPITAL | Age: 54
End: 2024-02-15

## 2024-02-19 ENCOUNTER — MED REC SCAN ONLY (OUTPATIENT)
Dept: FAMILY MEDICINE CLINIC | Facility: CLINIC | Age: 54
End: 2024-02-19

## 2024-02-19 ENCOUNTER — TELEPHONE (OUTPATIENT)
Dept: FAMILY MEDICINE CLINIC | Facility: CLINIC | Age: 54
End: 2024-02-19

## 2024-02-20 ENCOUNTER — OFFICE VISIT (OUTPATIENT)
Dept: PHYSICAL THERAPY | Age: 54
End: 2024-02-20
Attending: FAMILY MEDICINE
Payer: COMMERCIAL

## 2024-02-20 DIAGNOSIS — R20.2 PARESTHESIA AND PAIN OF BOTH UPPER EXTREMITIES: ICD-10-CM

## 2024-02-20 DIAGNOSIS — M79.601 PARESTHESIA AND PAIN OF BOTH UPPER EXTREMITIES: ICD-10-CM

## 2024-02-20 DIAGNOSIS — M48.02 STENOSIS OF CERVICAL SPINE WITH MYELOPATHY (HCC): Primary | ICD-10-CM

## 2024-02-20 DIAGNOSIS — G99.2 STENOSIS OF CERVICAL SPINE WITH MYELOPATHY (HCC): Primary | ICD-10-CM

## 2024-02-20 DIAGNOSIS — M79.602 PARESTHESIA AND PAIN OF BOTH UPPER EXTREMITIES: ICD-10-CM

## 2024-02-20 PROCEDURE — 97161 PT EVAL LOW COMPLEX 20 MIN: CPT

## 2024-02-20 PROCEDURE — 97110 THERAPEUTIC EXERCISES: CPT

## 2024-02-20 NOTE — PROGRESS NOTES
SPINE EVALUATION:     Diagnosis:   Stenosis of cervical spine with myelopathy (HCC) (M48.02,G99.2)  Paresthesia and pain of both upper extremities (R20.2,M79.601,M79.602)      Referring Provider: Radha  Date of Evaluation:    2/20/2024    Precautions:  None Next MD visit:   none scheduled  Date of Surgery: n/a     PATIENT SUMMARY   Carmelo Covington is a 53 year old male who presents to therapy today with complaints of headache, shoulder and upper back, numbness in both hands  Pt describes pain level at worst 8-9/10.   Current functional limitations include worse laying down (prefers to sleep on his side (uses cervical pillow), sitting, housework and driving.     Headaches can with produced with turning head too far, pressure to back of head (recliner, pillow on back of head or wearing a hat) (only headache when laying on a pillow)    Describes obstruction    Carmelo describes prior level of function no change last 6 months, tired of dealing with this. Pt goals include less pain.  Past medical history was reviewed with Carmelo. Significant findings include   Past Medical History:   Diagnosis Date    Carpal tunnel syndrome on both sides     Hypothyroid        Pt denies diplopia, dysarthria, dysphasia, dizziness, drop attacks, bowel/bladder changes, saddle anesthesia, and TAYE LE N/T.    ASSESSMENT  Carmelo presents to physical therapy evaluation with primary c/o neck and arm pain as well as hand numbness. The results of the objective tests and measures show limited ROM.  Functional deficits include but are not limited to sleeping, sitting, housework and driving.  Signs and symptoms are consistent with diagnosis of mechanical spine pain. Pt and PT discussed evaluation findings, pathology, POC and HEP.  Pt voiced understanding and performs HEP correctly without reported pain. Skilled Physical Therapy is medically necessary to address the above impairments and reach functional goals.     OBJECTIVE:   Observation/Posture: no  effect upright  Neuro Screen: sensation intact    Nerve length:  Ulnar intact bilaterally (not full ER, but noted below)  Median: min/mod loss bilaterally* (mod/min loss ER)  Radial: bilaterally roughly 110*    Cervical AROM: (* denotes performed with pain)  Flexion: intact without protrusion and with protrusion: min*  Extension: 152 without retraction, 155 with retraction  Sidebending: R 16; L 18  Rotation: R 43; L 46    Accessory motion: intact  Palpation: tender neck and shoulders bilaterally*    Strength: (* denotes performed with pain)  UE/Scapular   Shoulder Flex: R 3-/5*, L 3-/5*  Shoulder ABD (C5): R 5/5*, L 5/5*  Biceps (C6): R 5/5, L 5/5  Wrist ext (C6): R 5/5, L 5/5  Triceps (C7): R 5/5, L 5/5  Wrist Flex (C7): R 5/5, L 5/5  Thumb Ext (C8): R 5/5, L 5/5  Interossei (T1): R 5/5, L 5/5    Rhomboids: R 3-/5*; L 3-/5*  Mid trap: R 3-/5*; L 3-/5*  Lats: R 5/5, L 5/5  Low trap: R 3-/5*; L 3-/5*         Flexibility:   UE/Scapular   Upper Trap: R mod; L mod  Levator Scap: R mod; L mod  Pec Major: R mod; L mod  Scalenes: R mod, L mod     Special tests:  alar, transverse ligaments and C1 fracture test: intact    Today’s Treatment and Response:   Retraction AROM sitting x10: worse  Retraction supine AROM x10: worse    R lateral flexion x10x2: 2nd set overpressure: worse    L latearl flexion x10 x4: progressed to overrpessure and sustained 60': worse    R rotation x10x2: progressed to overpressure x2 sets: worse    R lateral flexion x10x2 supine with overpressure    L rotation sitting x10: worse    L lateral flexion supine x20: worse    R rotation supine x20: worse    Pt education was provided on exam findings, treatment diagnosis, treatment plan, expectations, and prognosis. Pt was also provided recommendations for postural corrections and ergonomics  Patient was instructed in and issued a HEP for: L thoracic rotation    Charges: PT Eval Low Complexity, 3 there ex      Total Timed Treatment: 38 min     Total  Treatment Time: 58 min       PLAN OF CARE:    Goals: (to be met in 12 visits)   Pt will improve NDI score from 60/100 to 30/100 to display improved ability to sleeping, sitting, housework and driving  Pt will reduce pain at its worst from 8-9/10 to 5-6/10 to allow for improved ability to sleeping, sitting, housework and driving  Pt will improve lateral flexion  to allow for improved ability to sleeping, sitting, housework and driving  Pt will be independent with home program to allow for maintenance of goals achieved in therapy.      Frequency / Duration: Patient will be seen for 2 x/week or a total of 12 visits over a 90 day period. Treatment will include: Manual Therapy, Neuromuscular Re-education, Self-Care Home Management, Therapeutic Activities, Therapeutic Exercise, and Home Exercise Program instruction    Education or treatment limitation: None  Rehab Potential:good    Neck Disability Index Score  Score: 60 % (2/13/2024  9:11 AM)      Patient/Family/Caregiver was advised of these findings, precautions, and treatment options and has agreed to actively participate in planning and for this course of care.    Thank you for your referral. Please co-sign or sign and return this letter via fax as soon as possible to [phone]. If you have any questions, please contact me at Dept: [phone]    Sincerely,  Electronically signed by therapist: Giovany Ayers PT    Physician's certification required: Yes  I certify the need for these services furnished under this plan of treatment and while under my care.    X___________________________________________________ Date____________________    Certification From: 2/20/2024  To:5/20/2024

## 2024-02-26 ENCOUNTER — TELEPHONE (OUTPATIENT)
Dept: FAMILY MEDICINE CLINIC | Facility: CLINIC | Age: 54
End: 2024-02-26

## 2024-02-26 NOTE — TELEPHONE ENCOUNTER
FYI:    PT SAW DR CLEMENTE AND WAS ADV THAT HE IS HAVING SOME PROCEDURE DONE ON 3/20    PLEASE LOOK FOR H&P ORDERS    THANK YOU

## 2024-02-27 ENCOUNTER — OFFICE VISIT (OUTPATIENT)
Dept: PHYSICAL THERAPY | Age: 54
End: 2024-02-27
Attending: FAMILY MEDICINE
Payer: COMMERCIAL

## 2024-02-27 PROCEDURE — 97110 THERAPEUTIC EXERCISES: CPT

## 2024-02-27 PROCEDURE — 97112 NEUROMUSCULAR REEDUCATION: CPT

## 2024-02-27 PROCEDURE — 97140 MANUAL THERAPY 1/> REGIONS: CPT

## 2024-02-27 NOTE — PROGRESS NOTES
Insurance (Authorized # of Visits):  [facility]         Authorizing Physician: Dr. 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 However, for about 48 hours shoulders by 1/3 upper humerus was very painful, hurt to raise toothbrush up.  Balance issues reported on MyChart have since gone away.    Carmelo Covington is a 53 year old male who presents to therapy today with complaints of headache, shoulder and upper back, numbness in both hands  Pt describes pain level at worst 8-9/10.   Current functional limitations include worse laying down (prefers to sleep on his side (uses cervical pillow), sitting, housework and driving.      Headaches can with produced with turning head too far, pressure to back of head (recliner, pillow on back of head or wearing a hat) (only headache when laying on a pillow)     Describes obstruction      Objective:   (Pain with *)  (Tested 2/27/2024)  Nerve length:  Ulnar intact bilaterally R, L end range pain*  Median: severe loss bilaterally, about 90 elbow flexion* (full ER)  Radial: bilaterally roughly 110*     Cervical AROM: (* denotes performed with pain)  Flexion: intact with protrusion:   Extension: 145 without retraction*,   Sidebending: R 15; L 17  Rotation: R 55; L 57  Retraction: mod loss*    Special tests:  Shoulder flexion 90 degrees* (roughly, bilateral pain)  Shoulder abduction 90 degrees* (roughly, bilateral pain)     Palpation: tender neck and shoulders bilaterally*     Strength: (* denotes performed with pain)  UE/Scapular   Shoulder Flex: R 4/5*, L 4/5*  Shoulder ABD (C5): R 5/5*, L  5/5*    Tested 2/20/2024:  Rhomboids: R 3-/5*; L 3-/5*  Mid trap: R 3-/5*; L 3-/5*  Lats: R 5/5, L 5/5  Low trap: R 3-/5*; L 3-/5*               Tested 2/20/2024:  Today’s Treatment and Response:   Retraction AROM sitting x10: worse  Retraction supine AROM x10: worse     R lateral flexion x10x2: 2nd set overpressure: worse     L latearl flexion x10 x4: progressed to overrpessure and sustained 60': worse     R rotation x10x2: progressed to overpressure x2 sets: worse     R lateral flexion x10x2 supine with overpressure: likely worse     L rotation sitting x10: worse     L lateral flexion supine x20: worse     R rotation supine x20: worse    Assessment: All exercises make patient worse, told to try home traction until which patient already owns and has used in the past, but to do so more often.         Goals: (to be met in 12 visits)   Pt will improve NDI score from 60/100 to 30/100 to display improved ability to sleeping, sitting, housework and driving  Pt will reduce pain at its worst from 8-9/10 to 5-6/10 to allow for improved ability to sleeping, sitting, housework and driving  Pt will improve lateral flexion  to allow for improved ability to sleeping, sitting, housework and driving  Pt will be independent with home program to allow for maintenance of goals achieved in therapy.    Plan:  If has done home traction until 4x a day see if now has directional preference    Traction again    See this:   Never done   R thoracic rotation   Mid-cervcial cervical extension (just look up, rolled towel behind neck, and maybe without rolled towel)      Charges: 1 manual therapy, 1 neuro re-education, 1 there ex     Total Timed Treatment: 38 min  Total Treatment Time: 38 min  Date: 2/27/2024  Tx#: 2 Date:   Tx#: 3 Date:  Tx#: 4 Date:  Tx#: 5 Date:  Tx#: 6   Ther-Ex 15 minutes:  Thoracic L rotation x20: maybe better, 30' sustained; worse    Arm pit sniff R x10: worse, L x10: worse    L rotation supine x10: worse      Educated on  home program, see below.  Verbal, visual and tactile cues for there ex.           Manual 15 minutes:  Long axis traction 8 minutes    Dry needling multifidus to C6 1 pinky width either direction R and L             N Re-Ed 8 minutes:  Discussed progress and plan of care

## 2024-02-29 ENCOUNTER — APPOINTMENT (OUTPATIENT)
Dept: PHYSICAL THERAPY | Age: 54
End: 2024-02-29
Attending: FAMILY MEDICINE
Payer: COMMERCIAL

## 2024-03-05 ENCOUNTER — APPOINTMENT (OUTPATIENT)
Dept: PHYSICAL THERAPY | Age: 54
End: 2024-03-05
Attending: FAMILY MEDICINE
Payer: COMMERCIAL

## 2024-03-05 ENCOUNTER — TELEPHONE (OUTPATIENT)
Dept: PHYSICAL THERAPY | Facility: HOSPITAL | Age: 54
End: 2024-03-05

## 2024-03-07 ENCOUNTER — OFFICE VISIT (OUTPATIENT)
Dept: FAMILY MEDICINE CLINIC | Facility: CLINIC | Age: 54
End: 2024-03-07
Payer: COMMERCIAL

## 2024-03-07 ENCOUNTER — OFFICE VISIT (OUTPATIENT)
Dept: PHYSICAL THERAPY | Age: 54
End: 2024-03-07
Attending: FAMILY MEDICINE
Payer: COMMERCIAL

## 2024-03-07 VITALS
WEIGHT: 267 LBS | HEART RATE: 72 BPM | TEMPERATURE: 98 F | RESPIRATION RATE: 14 BRPM | BODY MASS INDEX: 40.47 KG/M2 | OXYGEN SATURATION: 98 % | HEIGHT: 68 IN | SYSTOLIC BLOOD PRESSURE: 118 MMHG | DIASTOLIC BLOOD PRESSURE: 78 MMHG

## 2024-03-07 DIAGNOSIS — N20.0 RENAL CALCULUS, RIGHT: ICD-10-CM

## 2024-03-07 DIAGNOSIS — Z01.818 PREOP EXAMINATION: Primary | ICD-10-CM

## 2024-03-07 DIAGNOSIS — G99.2 STENOSIS OF CERVICAL SPINE WITH MYELOPATHY (HCC): ICD-10-CM

## 2024-03-07 DIAGNOSIS — M48.02 STENOSIS OF CERVICAL SPINE WITH MYELOPATHY (HCC): ICD-10-CM

## 2024-03-07 PROBLEM — N13.8 ENLARGED PROSTATE WITH URINARY OBSTRUCTION: Status: ACTIVE | Noted: 2024-02-05

## 2024-03-07 PROBLEM — N40.1 ENLARGED PROSTATE WITH URINARY OBSTRUCTION: Status: ACTIVE | Noted: 2024-02-05

## 2024-03-07 PROCEDURE — 93000 ELECTROCARDIOGRAM COMPLETE: CPT | Performed by: FAMILY MEDICINE

## 2024-03-07 PROCEDURE — 99214 OFFICE O/P EST MOD 30 MIN: CPT | Performed by: FAMILY MEDICINE

## 2024-03-07 PROCEDURE — 97140 MANUAL THERAPY 1/> REGIONS: CPT

## 2024-03-07 PROCEDURE — 97110 THERAPEUTIC EXERCISES: CPT

## 2024-03-07 PROCEDURE — 97112 NEUROMUSCULAR REEDUCATION: CPT

## 2024-03-07 RX ORDER — OXYBUTYNIN CHLORIDE 5 MG/1
5 TABLET, EXTENDED RELEASE ORAL DAILY
COMMUNITY
Start: 2024-02-05 | End: 2024-05-05

## 2024-03-07 RX ORDER — TAMSULOSIN HYDROCHLORIDE 0.4 MG/1
0.4 CAPSULE ORAL DAILY
COMMUNITY

## 2024-03-07 NOTE — PROGRESS NOTES
Insurance (Authorized # of Visits):  [facility]         Authorizing Physician: Dr. Garcia  Next MD visit: none scheduled    Fall Risk: standard         Precautions: n/a             Diagnosis:   Stenosis of cervical spine with myelopathy (HCC) (M48.02,G99.2)  Paresthesia and pain of both upper extremities (R20.2,M79.601,M79.602)        Referring Provider: Radha   Date of Evaluation:    2/20/2024  Precautions:  None  Next MD visit:   none scheduled  Date of Surgery: n/a      Subjective: Did some of program, better, less pain in shoulders by a lot and neck a bit better too.  Carmelo Covington is a 53 year old male who presents to therapy today with complaints of headache, shoulder and upper back, numbness in both hands  Pt describes pain level at worst 8-9/10.   Current functional limitations include worse laying down (prefers to sleep on his side (uses cervical pillow), sitting, housework and driving.      Headaches can with produced with turning head too far, pressure to back of head (recliner, pillow on back of head or wearing a hat) (only headache when laying on a pillow)     Describes obstruction      Objective:   (Pain with *)  (Tested 3/7/2024)      Nerve length:  Ulnar intact bilaterally R, L end range pain*  Median: severe loss bilaterally, about 90 elbow flexion* (full ER)  Radial: bilaterally roughly 110*     Cervical AROM: (* denotes performed with pain)    Extension: 137 without retraction  Sidebending: R 57; L 67  Side bending: R 38; L 23  Retraction: intact    Special tests:  Shoulder flexion 123 degrees* (roughly, bilateral pain)  Shoulder abduction 100 degrees* (roughly, bilateral pain)     Palpation: tender neck and shoulders bilaterally*     Strength: (* denotes performed with pain)  UE/Scapular   Shoulder Flex: R 4/5*, L 4/5*  Shoulder ABD (C5): R 5/5*, L 5/5*    Tested 2/20/2024:  Rhomboids: R 3-/5*; L 3-/5*  Mid trap: R 3-/5*; L 3-/5*  Lats: R 5/5, L 5/5  Low trap: R 3-/5*; L 3-/5*                 Assessment:Given trial of isometric strengthening        Goals: (to be met in 12 visits)   Pt will improve NDI score from 60/100 to 30/100 to display improved ability to sleeping, sitting, housework and driving  Pt will reduce pain at its worst from 8-9/10 to 5-6/10 to allow for improved ability to sleeping, sitting, housework and driving  Pt will improve lateral flexion  to allow for improved ability to sleeping, sitting, housework and driving  Pt will be independent with home program to allow for maintenance of goals achieved in therapy.    Plan:  Never done since home traction unit  Unloaded rotation (either way)  Mid-cervcial cervical lateral any      Charges: 1 manual therapy,  1 there ex, 1 neuro-re ed     Total Timed Treatment: 38 min  Total Treatment Time: 38 min  Date: 2/27/2024  Tx#: 2 Date: 3/7/2024  Tx#: 3 Date:  Tx#: 4 Date:  Tx#: 5 Date:  Tx#: 6   Ther-Ex 15 minutes:  Thoracic L rotation x20: maybe better, 30' sustained; worse    Arm pit sniff R x10: worse, L x10: worse    L rotation supine x10: worse      Educated on home program, see below.  Verbal, visual and tactile cues for there ex.     There ex 22 minutes:  Retraction sitting x10: worse    Retraction supine x10x3: worse    R rotation sitting x10: worse    L rotation sitting x10: worse    L lateral flexion sitting x10: worse    R lateral flexion sititng x10: worse    R rotation thoracic x10: worse    L rotation thoracic x10: worse    L lateral flexion supine x10: worse    R lateral flexion supine x10: worse    L arm pit sniff x10: worse    Thoracic extension with rolled towel behind neck x10: worse    R arm pit sniff x10: worse    Isometric cervical lateral x20x2    Educated on home program, see below.  Verbal, visual and tactile cues for there ex.        Manual 15 minutes:  Long axis traction 8 minutes    Dry needling multifidus to C6 1 pinky width either direction R and L       Manual 15 minutes:  Long axis traction 8 minutes    Dry  needling multifidus to C5, C6, C7 1 pinky width either direction R and L      N Re-Ed 8 minutes:  Discussed progress and plan of care   N Re-Ed 8 minutes:  Discussed progress and plan of care

## 2024-03-07 NOTE — PROGRESS NOTES
Carmelo Covington is a 53 year old male who presents for a pre-operative physical exam. Patient is to have RIGHT ESWL RIGHT, to be done by Dr. REG CLEMENTE at [facility] on 3/20/24.      HPI:   Pt was found to have a 4x5 mm right  kidney stone  while being worked up for other urinary issues and was noted incidentally. He has been completely asymptomatic, denies any flank pain or Hematuria, no fever. No known hx of renal calculus.     Current Outpatient Medications   Medication Sig Dispense Refill    oxybutynin ER 5 MG Oral Tablet 24 Hr Take 1 tablet (5 mg total) by mouth daily.      naproxen 500 MG Oral Tab Take 1 tablet (500 mg total) by mouth 2 (two) times daily with meals. 60 tablet 3    Amphetamine-Dextroamphet ER 15 MG Oral Capsule SR 24 Hr Take 1 capsule (15 mg total) by mouth every morning. 30 capsule 0    SYNTHROID 125 MCG Oral Tab Take 1 tablet (125 mcg total) by mouth nightly.      citalopram 20 MG Oral Tab Take 1 tablet (20 mg total) by mouth daily. 90 tablet 3    famotidine 20 MG Oral Tab Take 1 tablet (20 mg total) by mouth daily.      tamsulosin 0.4 MG Oral Cap Take 1 capsule (0.4 mg total) by mouth daily.      Fenofibrate 54 MG Oral Tab Take 1 tablet (54 mg total) by mouth daily. (Patient not taking: Reported on 1/11/2024) 90 tablet 2      Allergies: No Known Allergies   Past Medical History:   Diagnosis Date    Allergic rhinitis     Anxiety     Carpal tunnel syndrome on both sides     Esophageal reflux     Hypothyroid     Hypothyroidism     Obesity       Past Surgical History:   Procedure Laterality Date    OTHER SURGICAL HISTORY  01/01/2009    Carpal tunnel right hand    TONSILLECTOMY        Family History   Problem Relation Age of Onset    Cancer Father     Cancer Maternal Grandmother     Cancer Maternal Grandfather     Heart Disease Neg     Stroke Neg       Social History:   Social History     Socioeconomic History    Marital status:    Tobacco Use    Smoking status: Former     Packs/day: 1.00      Years: 3.00     Additional pack years: 0.00     Total pack years: 3.00     Types: Cigarettes     Quit date: 2021     Years since quittin.2    Smokeless tobacco: Never   Vaping Use    Vaping Use: Some days    Substances: Nicotine    Devices: Disposable   Substance and Sexual Activity    Alcohol use: Yes     Alcohol/week: 6.0 standard drinks of alcohol     Types: 6 Standard drinks or equivalent per week     Comment: a week    Drug use: Never   Other Topics Concern    Weight Concern Yes     Comment: cant loss weight      Occ: technician. : . Children: 3.   Exercise: minimal.  Diet: watches minimally     REVIEW OF SYSTEMS:   GENERAL: feels well otherwise  SKIN: denies any unusual skin lesions  EYES:denies blurred vision or double vision  HEENT: denies nasal congestion, sinus pain or ST  LUNGS: denies shortness of breath with exertion  CARDIOVASCULAR: denies chest pain on exertion  GI: denies abdominal pain,denies heartburn  : denies dysuria, vaginal discharge or itching,periods regular denies nocturia or changes in stream  MUSCULOSKELETAL: denies back pain  NEURO: denies headaches  PSYCHE: denies depression or anxiety  HEMATOLOGIC: denies hx of anemia  ENDOCRINE: denies thyroid history  ALL/ASTHMA: denies hx of allergy or asthma    EXAM:   /78   Pulse 72   Temp 98 °F (36.7 °C) (Temporal)   Resp 14   Ht 5' 8\" (1.727 m)   Wt 267 lb (121.1 kg)   SpO2 98%   BMI 40.60 kg/m²   GENERAL: well developed, well nourished,in no apparent distress  SKIN: no rashes,no suspicious lesions  HEENT: atraumatic, normocephalic,ears and throat are clear  EYES:PERRLA, EOMI, normal optic disk,conjunctiva are clear  NECK: supple,no adenopathy,no bruits  CHEST: no chest tenderness  BREAST: no dominant or suspicious mass  LUNGS: clear to auscultation  CARDIO: RRR without murmur  GI: good BS's,no masses, HSM or tenderness  : deferred  RECTAL: good rectal tone, prostate shows no masses, stool is OB  negative  MUSCULOSKELETAL: back is not tender,FROM of the back  EXTREMITIES: no cyanosis, clubbing or edema  NEURO: Oriented times three,cranial nerves are intact,motor and sensory are grossly intact    ASSESSMENT AND PLAN:     Encounter Diagnoses   Name Primary?    Preop examination Yes    Renal calculus, right     Stenosis of cervical spine with myelopathy (HCC)      CBC done 1/24/24 was unremarkable    EKG shows NSR  NO ACUTE ST-T WAVE CHANGES  NORMAL INTERVALS AND AXIS    Carmelo Covington is a 53 year old male who presents for a pre-operative physical exam. Patient is to have RIGHT ESWL RIGHT, to be done by Dr. REG CLEMENTE at [facility] on 3/20/24., Pt has the following conditions: . Pt has no significant history of cardiac or pulmonary conditions. Pt is a good surgical candidate. This consult was sent back the referring physician, Dr. Argueta . Salma.  MEDICALLY CLEAR FOR SURGERY

## 2024-03-11 ENCOUNTER — HOSPITAL ENCOUNTER (OUTPATIENT)
Dept: MRI IMAGING | Facility: HOSPITAL | Age: 54
Discharge: HOME | End: 2024-03-11
Attending: FAMILY MEDICINE
Payer: COMMERCIAL

## 2024-03-11 DIAGNOSIS — N31.9 NEUROGENIC DYSFUNCTION OF THE URINARY BLADDER: ICD-10-CM

## 2024-03-11 DIAGNOSIS — M51.27 HERNIATED NUCLEUS PULPOSUS, L5-S1, RIGHT: ICD-10-CM

## 2024-03-11 DIAGNOSIS — M54.50 LOW BACK PAIN OF OVER 3 MONTHS DURATION: ICD-10-CM

## 2024-03-11 DIAGNOSIS — R33.9 URINARY RETENTION: ICD-10-CM

## 2024-03-11 PROCEDURE — 72148 MRI LUMBAR SPINE W/O DYE: CPT | Performed by: FAMILY MEDICINE

## 2024-03-12 ENCOUNTER — TELEPHONE (OUTPATIENT)
Dept: FAMILY MEDICINE CLINIC | Facility: CLINIC | Age: 54
End: 2024-03-12

## 2024-03-12 NOTE — PROGRESS NOTES
Carmelo, your MRI showed yo have some bulging discs and arthritis compression the nerve root as they exit the spinal cord, in your lower back. I don;t think any of this requires surgery, but I do think you need probably would benefit from pain management, and injections possibly. I will place a referral for you.  [phone]. [facility]. He will access to your MRI results as well. Lets see what his thought are.    .

## 2024-03-12 NOTE — TELEPHONE ENCOUNTER
----- Message from Rui Garcia DO sent at 3/12/2024  9:01 AM CDT -----  Carmelo, your MRI showed yo have some bulging discs and arthritis compression the nerve root as they exit the spinal cord, in your lower back. I don;t think any of this requires surgery, but I do think you need probably would benefit from pain management, and injections possibly. I will place a referral for you.  [phone]. Dr. [facility]. He will access to your MRI results as well. Lets see what his thought are.    .

## 2024-03-14 ENCOUNTER — HOSPITAL ENCOUNTER (EMERGENCY)
Age: 54
Discharge: HOME | End: 2024-03-15
Attending: EMERGENCY MEDICINE
Payer: COMMERCIAL

## 2024-03-14 DIAGNOSIS — N20.0 KIDNEY STONE: Primary | ICD-10-CM

## 2024-03-14 LAB
BASOPHILS # BLD AUTO: 0.04 X10(3) UL (ref 0–0.2)
BASOPHILS NFR BLD AUTO: 0.4 %
EOSINOPHIL # BLD AUTO: 0.08 X10(3) UL (ref 0–0.7)
EOSINOPHIL NFR BLD AUTO: 0.9 %
ERYTHROCYTE [DISTWIDTH] IN BLOOD BY AUTOMATED COUNT: 13.1 %
HCT VFR BLD AUTO: 42 %
HGB BLD-MCNC: 14.8 G/DL
IMM GRANULOCYTES # BLD AUTO: 0.04 X10(3) UL (ref 0–1)
IMM GRANULOCYTES NFR BLD: 0.4 %
LYMPHOCYTES # BLD AUTO: 1.47 X10(3) UL (ref 1–4)
LYMPHOCYTES NFR BLD AUTO: 16.1 %
MCH RBC QN AUTO: 30 PG (ref 26–34)
MCHC RBC AUTO-ENTMCNC: 35.2 G/DL (ref 31–37)
MCV RBC AUTO: 85.2 FL
MONOCYTES # BLD AUTO: 0.54 X10(3) UL (ref 0.1–1)
MONOCYTES NFR BLD AUTO: 5.9 %
NEUTROPHILS # BLD AUTO: 6.95 X10 (3) UL (ref 1.5–7.7)
NEUTROPHILS # BLD AUTO: 6.95 X10(3) UL (ref 1.5–7.7)
NEUTROPHILS NFR BLD AUTO: 76.3 %
PLATELET # BLD AUTO: 222 10(3)UL (ref 150–450)
RBC # BLD AUTO: 4.93 X10(6)UL
WBC # BLD AUTO: 9.1 X10(3) UL (ref 4–11)

## 2024-03-14 PROCEDURE — 96361 HYDRATE IV INFUSION ADD-ON: CPT

## 2024-03-14 PROCEDURE — 96375 TX/PRO/DX INJ NEW DRUG ADDON: CPT

## 2024-03-14 PROCEDURE — 85025 COMPLETE CBC W/AUTO DIFF WBC: CPT | Performed by: EMERGENCY MEDICINE

## 2024-03-14 PROCEDURE — 96374 THER/PROPH/DIAG INJ IV PUSH: CPT

## 2024-03-14 PROCEDURE — 99284 EMERGENCY DEPT VISIT MOD MDM: CPT

## 2024-03-14 PROCEDURE — 99285 EMERGENCY DEPT VISIT HI MDM: CPT

## 2024-03-14 PROCEDURE — 80048 BASIC METABOLIC PNL TOTAL CA: CPT | Performed by: EMERGENCY MEDICINE

## 2024-03-14 RX ORDER — ONDANSETRON 2 MG/ML
4 INJECTION INTRAMUSCULAR; INTRAVENOUS ONCE
Status: COMPLETED | OUTPATIENT
Start: 2024-03-14 | End: 2024-03-14

## 2024-03-14 RX ORDER — KETOROLAC TROMETHAMINE 30 MG/ML
30 INJECTION, SOLUTION INTRAMUSCULAR; INTRAVENOUS ONCE
Status: COMPLETED | OUTPATIENT
Start: 2024-03-14 | End: 2024-03-14

## 2024-03-14 RX ORDER — HYDROMORPHONE HYDROCHLORIDE 1 MG/ML
0.5 INJECTION, SOLUTION INTRAMUSCULAR; INTRAVENOUS; SUBCUTANEOUS EVERY 30 MIN PRN
Status: DISCONTINUED | OUTPATIENT
Start: 2024-03-14 | End: 2024-03-15

## 2024-03-15 ENCOUNTER — APPOINTMENT (OUTPATIENT)
Dept: CT IMAGING | Age: 54
End: 2024-03-15
Attending: EMERGENCY MEDICINE
Payer: COMMERCIAL

## 2024-03-15 VITALS
HEIGHT: 68.5 IN | OXYGEN SATURATION: 98 % | SYSTOLIC BLOOD PRESSURE: 124 MMHG | RESPIRATION RATE: 16 BRPM | BODY MASS INDEX: 38.21 KG/M2 | WEIGHT: 255 LBS | TEMPERATURE: 99 F | DIASTOLIC BLOOD PRESSURE: 78 MMHG | HEART RATE: 78 BPM

## 2024-03-15 LAB
ANION GAP SERPL CALC-SCNC: 8 MMOL/L (ref 0–18)
BUN BLD-MCNC: 17 MG/DL (ref 9–23)
CALCIUM BLD-MCNC: 8.9 MG/DL (ref 8.5–10.1)
CHLORIDE SERPL-SCNC: 104 MMOL/L (ref 98–112)
CO2 SERPL-SCNC: 26 MMOL/L (ref 21–32)
COLOR UR AUTO: YELLOW
CREAT BLD-MCNC: 1.29 MG/DL
EGFRCR SERPLBLD CKD-EPI 2021: 67 ML/MIN/1.73M2 (ref 60–?)
GLUCOSE BLD-MCNC: 156 MG/DL (ref 70–99)
GLUCOSE UR STRIP.AUTO-MCNC: NEGATIVE MG/DL
KETONES UR STRIP.AUTO-MCNC: NEGATIVE MG/DL
LEUKOCYTE ESTERASE UR QL STRIP.AUTO: NEGATIVE
NITRITE UR QL STRIP.AUTO: NEGATIVE
OSMOLALITY SERPL CALC.SUM OF ELEC: 291 MOSM/KG (ref 275–295)
PH UR STRIP.AUTO: 5.5 [PH] (ref 5–8)
POTASSIUM SERPL-SCNC: 3.8 MMOL/L (ref 3.5–5.1)
RBC #/AREA URNS AUTO: >10 /HPF
RBC #/AREA URNS AUTO: >10 /HPF
SODIUM SERPL-SCNC: 138 MMOL/L (ref 136–145)
SP GR UR STRIP.AUTO: >=1.03 (ref 1–1.03)
UROBILINOGEN UR STRIP.AUTO-MCNC: 0.2 MG/DL

## 2024-03-15 PROCEDURE — 81015 MICROSCOPIC EXAM OF URINE: CPT | Performed by: EMERGENCY MEDICINE

## 2024-03-15 PROCEDURE — 81001 URINALYSIS AUTO W/SCOPE: CPT | Performed by: EMERGENCY MEDICINE

## 2024-03-15 RX ORDER — ONDANSETRON 4 MG/1
4 TABLET, ORALLY DISINTEGRATING ORAL EVERY 4 HOURS PRN
Qty: 10 TABLET | Refills: 0 | Status: SHIPPED | OUTPATIENT
Start: 2024-03-15 | End: 2024-03-22

## 2024-03-15 RX ORDER — HYDROCODONE BITARTRATE AND ACETAMINOPHEN 5; 325 MG/1; MG/1
1-2 TABLET ORAL EVERY 4 HOURS PRN
Qty: 12 TABLET | Refills: 0 | Status: SHIPPED | OUTPATIENT
Start: 2024-03-15

## 2024-03-15 NOTE — ED PROVIDER NOTES
Patient Seen in: [facility] In [place]      History     Chief Complaint   Patient presents with    Abdomen/Flank Pain     Stated Complaint: right abd/flank pain started this evening. nausea no vomiting.    Subjective:   HPI    53-year-old male presents with severe right-sided flank pain that began a short while ago.  He reports nausea but had no vomiting.  He says he is actively following with a urologist for a kidney stone.  He had been following up after they found blood in his urine and he had a CT scan performed that showed a right-sided ureteral stone that measured 4 x 5 mm.  He says he is scheduled for lithotripsy next week.  He has not been having any flank pain but he says his urologist prescribed multiple medications and he believes one of them was tamsulosin.    Objective:   Past Medical History:   Diagnosis Date    Allergic rhinitis     Anxiety     Carpal tunnel syndrome on both sides     Esophageal reflux     Hypothyroid     Hypothyroidism     Obesity               Past Surgical History:   Procedure Laterality Date    OTHER SURGICAL HISTORY  2009    Carpal tunnel right hand    TONSILLECTOMY                  Social History     Socioeconomic History    Marital status:    Tobacco Use    Smoking status: Former     Packs/day: 1.00     Years: 3.00     Additional pack years: 0.00     Total pack years: 3.00     Types: Cigarettes     Quit date: 2021     Years since quittin.3    Smokeless tobacco: Never   Vaping Use    Vaping Use: Some days    Substances: Nicotine    Devices: Disposable   Substance and Sexual Activity    Alcohol use: Yes     Alcohol/week: 6.0 standard drinks of alcohol     Types: 6 Standard drinks or equivalent per week     Comment: a week    Drug use: Never   Other Topics Concern    Weight Concern Yes     Comment: cant loss weight              Review of Systems    Positive for stated complaint: right abd/flank pain started this evening. nausea no  vomiting.  Other systems are as noted in HPI.  Constitutional and vital signs reviewed.      All other systems reviewed and negative except as noted above.    Physical Exam     ED Triage Vitals [03/14/24 2338]   /74   Pulse 67   Resp 16   Temp 98.7 °F (37.1 °C)   Temp src Oral   SpO2 97 %   O2 Device None (Room air)       Current:/74   Pulse 67   Temp 98.7 °F (37.1 °C) (Oral)   Resp 16   Ht 174 cm (5' 8.5\")   Wt 115.7 kg   SpO2 97%   BMI 38.21 kg/m²         Physical Exam    General:  Vitals as listed.  No acute distress (post pain medication)  HEENT: Sclerae anicteric.  Conjunctivae show no pallor.  Oropharynx clear, mucous membranes moist   Neck: supple, no rigidity   Lungs: good air exchange and clear   Heart: regular rate rhythm and no murmur   Abdomen: Soft and nontender.  No abdominal masses.  No peritoneal signs   Extremities: no edema, normal peripheral pulses   Neuro: Alert oriented and nonfocal   Skin: no rashes or nodules    ED Course     Labs Reviewed   BASIC METABOLIC PANEL (8) - Abnormal; Notable for the following components:       Result Value    Glucose 156 (*)     All other components within normal limits   URINALYSIS, ROUTINE - Abnormal; Notable for the following components:    Clarity Urine Cloudy (*)     Bilirubin Urine Small (*)     Blood Urine Large (*)     Protein Urine 100 mg/dL (*)     WBC Urine 11-20 (*)     RBC Urine >10 (*)     Bacteria Urine Rare (*)     Squamous Epi. Cells Few (*)     All other components within normal limits   UA MICROSCOPIC ONLY, URINE - Abnormal; Notable for the following components:    WBC Urine 11-20 (*)     RBC Urine >10 (*)     Bacteria Urine Rare (*)     Squamous Epi. Cells Few (*)     All other components within normal limits   CBC WITH DIFFERENTIAL WITH PLATELET    Narrative:     The following orders were created for panel order CBC With Differential With Platelet.  Procedure                               Abnormality         Status                      ---------                               -----------         ------                     CBC W/ DIFFERENTIAL[[id number]]                              Final result                 Please view results for these tests on the individual orders.   CBC W/ DIFFERENTIAL                      MDM      53-year-old male presents reporting severe right-sided flank pain that radiates into the anterior abdomen, groin and testicle areas.  CT scan from [facility] performed on February 20, 2024 demonstrates a 4 x 4 x 5 mm calculus at the proximal right ureter just distal to the UPJ.  He is following with Dr. Marsh at [facility] and says he is scheduled for lithotripsy on Wednesday.    Additional history obtained by his wife who reports that she is never seen him in this Pain before.    Differential includes but is not limited to kidney stone, gastroenteritis, a life threat.    CBC, CMP, urinalysis ordered for further evaluation.    Laboratory evaluation shows hematuria.  There is not convincing evidence of UTI.  He is actively following with urology for management of this kidney stone.  It had been asymptomatic until this evening.  He says he has Norco at home and is on tamsulosin.  He thinks he only has a couple left from a previous issue so I will send a prescription for Norco and Zofran to the pharmacy.  Encouraged him to update his urologist about his pain.  Should continue to monitor symptoms and return with fever or worsening pain.  He is comfortable with this plan.                                       Medical Decision Making      Disposition and Plan     Clinical Impression:  1. Kidney stone         Disposition:  Discharge  3/15/2024  1:23 am    Follow-up:  Rui Garcia,   [address]  [facility] [address] [phone]    Schedule an appointment as soon as possible for a visit      Kendall Marsh MD  [address] [address] [phone]    Follow up  Call  the office tomorrow and update them on your pain          Medications Prescribed:  Current Discharge Medication List        START taking these medications    Details   HYDROcodone-acetaminophen 5-325 MG Oral Tab Take 1-2 tablets by mouth every 4 (four) hours as needed for Pain.  Qty: 12 tablet, Refills: 0    Associated Diagnoses: Kidney stone      ondansetron 4 MG Oral Tablet Dispersible Take 1 tablet (4 mg total) by mouth every 4 (four) hours as needed for Nausea.  Qty: 10 tablet, Refills: 0    Associated Diagnoses: Kidney stone

## 2024-03-18 ENCOUNTER — TELEPHONE (OUTPATIENT)
Dept: FAMILY MEDICINE CLINIC | Facility: CLINIC | Age: 54
End: 2024-03-18

## 2024-03-18 LAB
ATRIAL RATE: 70 BPM
P AXIS: 36 DEGREES
P-R INTERVAL: 178 MS
Q-T INTERVAL: 388 MS
QRS DURATION: 92 MS
QTC CALCULATION (BEZET): 419 MS
R AXIS: -10 DEGREES
T AXIS: 15 DEGREES
VENTRICULAR RATE: 70 BPM

## 2024-03-18 NOTE — TELEPHONE ENCOUNTER
RN spoke with Christie- advised that we will resend information again today- she has not received our pre op information.    Fax # verified in system

## 2024-03-18 NOTE — TELEPHONE ENCOUNTER
Christie from [facility] she needs EKG Strip from pre-op.  Also wants to know if you did a urine culture? Please advise.   Thank you!    Fax: [phone]

## 2024-03-18 NOTE — TELEPHONE ENCOUNTER
Rn REturned call to Christie [phone] Opt 4 for urology    Our pre op orders do not show that a urine is needed?    LYNDON LM for Christie to call back

## 2024-03-19 ENCOUNTER — APPOINTMENT (OUTPATIENT)
Dept: PHYSICAL THERAPY | Age: 54
End: 2024-03-19
Attending: FAMILY MEDICINE
Payer: COMMERCIAL

## 2024-03-19 DIAGNOSIS — F90.0 ATTENTION DEFICIT HYPERACTIVITY DISORDER (ADHD), PREDOMINANTLY INATTENTIVE TYPE: ICD-10-CM

## 2024-03-19 RX ORDER — DEXTROAMPHETAMINE SACCHARATE, AMPHETAMINE ASPARTATE MONOHYDRATE, DEXTROAMPHETAMINE SULFATE AND AMPHETAMINE SULFATE 3.75; 3.75; 3.75; 3.75 MG/1; MG/1; MG/1; MG/1
15 CAPSULE, EXTENDED RELEASE ORAL EVERY MORNING
Qty: 30 CAPSULE | Refills: 0 | Status: SHIPPED | OUTPATIENT
Start: 2024-03-19 | End: 2024-04-18

## 2024-03-19 RX ORDER — FAMOTIDINE 20 MG/1
20 TABLET, FILM COATED ORAL DAILY
Qty: 90 TABLET | Refills: 2 | Status: SHIPPED | OUTPATIENT
Start: 2024-03-19

## 2024-03-21 ENCOUNTER — APPOINTMENT (OUTPATIENT)
Dept: PHYSICAL THERAPY | Age: 54
End: 2024-03-21
Attending: FAMILY MEDICINE
Payer: COMMERCIAL

## 2024-03-21 NOTE — PROGRESS NOTES
Insurance (Authorized # of Visits):  [facility]         Authorizing Physician: Dr. Garcia  Next MD visit: none scheduled    Fall Risk: standard         Precautions: n/a             Diagnosis:   Stenosis of cervical spine with myelopathy (HCC) (M48.02,G99.2)  Paresthesia and pain of both upper extremities (R20.2,M79.601,M79.602)        Referring Provider: Radha   Date of Evaluation:    2/20/2024  Precautions:  None  Next MD visit:   none scheduled  Date of Surgery: n/a      Subjective: Did some of program, better, less pain in shoulders by a lot and neck a bit better too.  Carmelo Covington is a 53 year old male who presents to therapy today with complaints of headache, shoulder and upper back, numbness in both hands  Pt describes pain level at worst 8-9/10.   Current functional limitations include worse laying down (prefers to sleep on his side (uses cervical pillow), sitting, housework and driving.      Headaches can with produced with turning head too far, pressure to back of head (recliner, pillow on back of head or wearing a hat) (only headache when laying on a pillow)     Describes obstruction      Objective:   (Pain with *)  (Tested 3/7/2024)      Nerve length:  Ulnar intact bilaterally R, L end range pain*  Median: severe loss bilaterally, about 90 elbow flexion* (full ER)  Radial: bilaterally roughly 110*     Cervical AROM: (* denotes performed with pain)    Extension: 137 without retraction  Sidebending: R 57; L 67  Side bending: R 38; L 23  Retraction: intact    Special tests:  Shoulder flexion 123 degrees* (roughly, bilateral pain)  Shoulder abduction 100 degrees* (roughly, bilateral pain)     Palpation: tender neck and shoulders bilaterally*     Strength: (* denotes performed with pain)  UE/Scapular   Shoulder Flex: R 4/5*, L 4/5*  Shoulder ABD (C5): R 5/5*, L 5/5*    Tested 2/20/2024:  Rhomboids: R 3-/5*; L 3-/5*  Mid trap: R 3-/5*; L 3-/5*  Lats: R 5/5, L 5/5  Low trap: R 3-/5*; L 3-/5*                 Assessment:Given trial of isometric strengthening        Goals: (to be met in 12 visits)   Pt will improve NDI score from 60/100 to 30/100 to display improved ability to sleeping, sitting, housework and driving  Pt will reduce pain at its worst from 8-9/10 to 5-6/10 to allow for improved ability to sleeping, sitting, housework and driving  Pt will improve lateral flexion  to allow for improved ability to sleeping, sitting, housework and driving  Pt will be independent with home program to allow for maintenance of goals achieved in therapy.    Plan:  Never done since home traction unit  Unloaded rotation (either way)  Mid-cervcial cervical lateral any      Charges: 1 manual therapy,  1 there ex, 1 neuro-re ed     Total Timed Treatment: 38 min  Total Treatment Time: 38 min  Date: 2/27/2024  Tx#: 2 Date: 3/7/2024  Tx#: 3 Date:  Tx#: 4 Date:  Tx#: 5 Date:  Tx#: 6   Ther-Ex 15 minutes:  Thoracic L rotation x20: maybe better, 30' sustained; worse    Arm pit sniff R x10: worse, L x10: worse    L rotation supine x10: worse      Educated on home program, see below.  Verbal, visual and tactile cues for there ex.     There ex 22 minutes:  Retraction sitting x10: worse    Retraction supine x10x3: worse    R rotation sitting x10: worse    L rotation sitting x10: worse    L lateral flexion sitting x10: worse    R lateral flexion sititng x10: worse    R rotation thoracic x10: worse    L rotation thoracic x10: worse    L lateral flexion supine x10: worse    R lateral flexion supine x10: worse    L arm pit sniff x10: worse    Thoracic extension with rolled towel behind neck x10: worse    R arm pit sniff x10: worse    Isometric cervical lateral x20x2    Educated on home program, see below.  Verbal, visual and tactile cues for there ex.        Manual 15 minutes:  Long axis traction 8 minutes    Dry needling multifidus to C6 1 pinky width either direction R and L       Manual 15 minutes:  Long axis traction 8 minutes    Dry  needling multifidus to C5, C6, C7 1 pinky width either direction R and L      N Re-Ed 8 minutes:  Discussed progress and plan of care   N Re-Ed 8 minutes:  Discussed progress and plan of care

## 2024-04-02 RX ORDER — LEVOTHYROXINE SODIUM 0.12 MG/1
125 TABLET ORAL
Qty: 90 TABLET | Refills: 3 | Status: SHIPPED | OUTPATIENT
Start: 2024-04-02

## 2024-04-02 NOTE — TELEPHONE ENCOUNTER
Thyroid Medication Protocol [id number]/01/2024 11:10 PM   Protocol Details TSH in past 12 months    Last TSH value is normal    In person appointment or virtual visit in the past 12 mos or appointment in next 3        LOV: 3/7/24   Last Refill: 1/3/24     Lab Results   Component Value Date    T4F 0.7 (L) 07/01/2019    TSH 1.510 12/19/2023

## 2024-04-03 NOTE — PROGRESS NOTES
Date of Service: 4/4/2024  Dx: Stenosis of cervical spine with myelopathy (HCC) (M48.02,G99.2), Paresthesia and pain of both upper extremities (R20.2,M79.601,M79.602)           Insurance: [facility] CHOICE/HMO/POS/EPO  Insurance Limits: 30 visit limit  Visit #: 4  Authorized # of Visits: 8 recommended  POC/Auth Expiration: N/A  Date of Last PN: 2/20/24 (Visit #1)  Authorizing Physician/Provider: Radha Thornton MD visit: 4/8/24  Fall Risk: Standard         Precautions: None            Subjective: \"I've had, for years, neck and back issues. I figured now that I'm gonna be 54, I should start looking into them. I had a full MRI in 2013 and they told me I should talk to somebody. I went and saw Dr. Garcia and he thought the same thing. I never did anything about it until recently. They re-did the MRI for my neck. After I had been here, my lumbar appt was scheduled for that, so that's been done.   The patient reports that his neck always feels like he's \"wearing a weighted blanket. My shoulders just constantly ache. When I sit, my arms go numb, and when I sleep...\" The patient reports that he only sleeps on his side because pillows will cause headaches. He can lie on either side, but will still have problems on either side.     I wear a hard hat for work and that will give me a headache. If I turn my neck too far, it'll cause a headache.     The patient reports that he responded well to the dry needling - he had less neck pain, but had more shoulder pain.     The patient report occasional episodes of locking in his neck.     Objective:     Pain/Symptom Presentation: Patient complaints more of right-sided neck pain. He has numbness in both arms and hands. \"It starts in my shoulder. If I lie here for 15 minutes, I would be shaking my hands.\"  Pain at rest: 0/10  Pain at worst: 10/10    Inspection/Observation: Patient demonstrates sitting postural dysfunction with forward head posture, excessive neck protraction and  thoracic kyphosis, anterior tilt and downward rotation of the scapula, internal rotation of shoulder, decreased scapular and abdominal tone, and reduced lumbar lordosis.    Palpation: Patient demonstrates structural thoracic kyphosis.     ROM:  Cervical Motion AROM    Right Left   Cervical Flexion 30   Cervical Extension 35*   Cervical Side Bending 15 15   Cervical Rotation 30* 30*   *indicates activity was associated with pain     Neck Special Tests:   Upper Limb Tension Tests:   Median Nerve Tension: (R) (+), (L) (+)  Radial Nerve Tension: (R) (-), (L) (-)  Ulnar Nerve Tension: (R) (-), (L) (-)     HEP: Patient was issued HEP handout 4/4/24 including S/L thoracic rotation, 90-90 doorway pec stretch, and modified downward dog.     Treatment:    Therapeutic Exercise: x 15min  Sidelying thoracic rotation: 1 x 10 (B)   Doorway pec stretch (arms by side): x 30\"   Doorway pec stretch (90-90): x 30\"   Modified downward dog: 1 x 10 x 3\"   HEP update: Reviewed new HEP handout with new exercises and progressions, provided verbal and written instructions/cueing for proper technique and common errors/compensations as needed. Reviewed the importance of compliance with home exercise program to facilitate recovery and meet long-term goals.      Manual Therapy: x 30min  Suboccipital release: x 3min  Cervical distraction: 2 x 30\"   Cervical upglide joint mobs - C3-C7: Grade 3, 2 x 10\" each  Thoracic PA accessory joint mobs - T1-T12: Grade 3, 2 x 10\" each   Soft tissue mobilization (supine): (B) upper trap, levator, SCM, scalenes   Soft tissue mobilization (prone): (B) thoracic paraspinals, parascapular musculature  Patient education: self-massage/TPR with lacrosse ball    Provider Interactions With Patient:   Added therapeutic exercises as documented, with cueing provided throughout performance to ensure correct technique during exercise.  Provided patient with a written copy of exercise instruction which was also documented in  patient's electronic medical chart.  Verbally confirmed the patient's next appt date and time to ensure consistency with physical therapy attendance.     Assessment: Carmelo arrives today after a month-long break in physical therapy due to other medical health conditions and treatments. The patient reports chronic neck pain and radicular symptoms into both hands. The patient reports frequent headaches that start when lying supine with a pillow or even wearing a hard hat. He demonstrates median nerve tension deficits and a structural thoracic kyphosis. The patient was educated on the postural component to his condition and pathophysiology. Carmelo complains of bilateral shoulder pain and stiffness/tightness that we will continue to address at his next appt. The patient would benefit from continued physical therapy for further progression in postural mobility, strength, and endurance for improved pain and symptom management, ADL tolerance, and occupational performance.     Goals:   (to be met in 12 visits)   Pt will improve NDI score from 60/100 to 30/100 to display improved ability to sleeping, sitting, housework and driving  Pt will reduce pain at its worst from 8-9/10 to 5-6/10 to allow for improved ability to sleeping, sitting, housework and driving  Pt will improve lateral flexion  to allow for improved ability to sleeping, sitting, housework and driving  Pt will be independent with home program to allow for maintenance of goals achieved in therapy.    Plan: Instruct patient is self-massage/TPR for paraspinal and parascapular musculature.       Charges: MT x 2, Therex x 1         Total Timed Treatment: 45min    Total Treatment Time: 45min

## 2024-04-04 ENCOUNTER — OFFICE VISIT (OUTPATIENT)
Dept: PHYSICAL THERAPY | Age: 54
End: 2024-04-04
Attending: FAMILY MEDICINE
Payer: COMMERCIAL

## 2024-04-04 ENCOUNTER — APPOINTMENT (OUTPATIENT)
Dept: PHYSICAL THERAPY | Age: 54
End: 2024-04-04
Attending: FAMILY MEDICINE
Payer: COMMERCIAL

## 2024-04-04 PROCEDURE — 97110 THERAPEUTIC EXERCISES: CPT

## 2024-04-04 PROCEDURE — 97140 MANUAL THERAPY 1/> REGIONS: CPT

## 2024-04-04 NOTE — PATIENT INSTRUCTIONS
Thank you for coming to your physical therapy appt! During your appt today you were issued a HEP handout from [email or web address] which can also be accessed using the information below. The exercises chosen are meant to supplement the treatment you received and reinforce the progress made in physical therapy. It is important to stay consistent with your exercises to help facilitate and maximize your recovery!      View at [email or web address] using code: [email or web address]

## 2024-04-08 ENCOUNTER — OFFICE VISIT (OUTPATIENT)
Dept: PAIN CLINIC | Facility: CLINIC | Age: 54
End: 2024-04-08
Payer: COMMERCIAL

## 2024-04-08 VITALS
WEIGHT: 268 LBS | OXYGEN SATURATION: 94 % | BODY MASS INDEX: 40 KG/M2 | DIASTOLIC BLOOD PRESSURE: 86 MMHG | SYSTOLIC BLOOD PRESSURE: 140 MMHG | HEART RATE: 93 BPM

## 2024-04-08 DIAGNOSIS — M50.20 HERNIATED CERVICAL DISC: Primary | ICD-10-CM

## 2024-04-08 PROCEDURE — 99204 OFFICE O/P NEW MOD 45 MIN: CPT | Performed by: ANESTHESIOLOGY

## 2024-04-08 NOTE — PROGRESS NOTES
Subjective:   Patient ID: Carmelo Covington is a 53 year old male.    HPI    History/Other:   Review of Systems  Current Outpatient Medications   Medication Sig Dispense Refill   • levothyroxine 125 MCG Oral Tab Take 1 tablet (125 mcg total) by mouth before breakfast. 90 tablet 3   • famotidine 20 MG Oral Tab Take 1 tablet (20 mg total) by mouth daily. 90 tablet 2   • Amphetamine-Dextroamphet ER 15 MG Oral Capsule SR 24 Hr Take 1 capsule (15 mg total) by mouth every morning. 30 capsule 0   • HYDROcodone-acetaminophen 5-325 MG Oral Tab Take 1-2 tablets by mouth every 4 (four) hours as needed for Pain. 12 tablet 0   • oxybutynin ER 5 MG Oral Tablet 24 Hr Take 1 tablet (5 mg total) by mouth daily.     • tamsulosin 0.4 MG Oral Cap Take 1 capsule (0.4 mg total) by mouth daily.     • naproxen 500 MG Oral Tab Take 1 tablet (500 mg total) by mouth 2 (two) times daily with meals. 60 tablet 3   • citalopram 20 MG Oral Tab Take 1 tablet (20 mg total) by mouth daily. 90 tablet 3   • Fenofibrate 54 MG Oral Tab Take 1 tablet (54 mg total) by mouth daily. (Patient not taking: Reported on 1/11/2024) 90 tablet 2     Allergies:No Known Allergies    Objective:   Physical Exam  Constitutional:          Assessment & Plan:   No diagnosis found.    No orders of the defined types were placed in this encounter.      Meds This Visit:  Requested Prescriptions      No prescriptions requested or ordered in this encounter       Imaging & Referrals:  None    Location of Pain: HEAD, BILATERAL NECK AND SHOULDERS, BILATERAL HANDS, BILATERAL GLUTES, LEFT UPPER LEG, BILATERAL FEET    Date Pain Began: 2000          Work Related:   No        Receiving Work Comp/Disability:   No    Numeric Rating Scale:  Pain at Present:  2/10                                                                                                            (No Pain) 0  to  10 (Worst Pain)                 Minimum Pain:   2  Maximum Pain  5    Distribution of Pain:     bilateral    Quality of Pain:   aching, numbness, throbbing, and tingling    Origin of Pain:    Other unknown    Aggravating Factors:    Cold, Sitting, Standing, and Other laying flat    Past Treatments for Current Pain Condition:   Other injections    Prior diagnostic testing for your pain:  MRI

## 2024-04-08 NOTE — H&P
Name: Carmelo Covington   : 1970   DOS: 2024     Chief complaint: Low back and neck pain    History of present illness:  Carmelo Covington is a 53 year old left handed male with a history of hypothyroidism who presents today for evaluation of neck pain and low back pain.  From a symptom standpoint, patient reports a longstanding history of pain dating back to .  Complains of achiness into bilateral shoulders.  This is worsened over time.  Made worse by sitting, standing, and laying flat.  Did have some type of injections years ago but cannot recall specifics.  Rates his pain as 5 out of 10 with exacerbation to 8 out of 10.  Complains of worsening numbness symptoms but denies dropping of objects.    Also complains of low back pain with bilateral lower extremity throbbing.  This is made worse by standing and walking.    Past Medical History:   Diagnosis Date    Allergic rhinitis     Anxiety     Carpal tunnel syndrome on both sides     Esophageal reflux     Hypothyroid     Hypothyroidism     Obesity       Current Outpatient Medications   Medication Sig Dispense Refill    levothyroxine 125 MCG Oral Tab Take 1 tablet (125 mcg total) by mouth before breakfast. 90 tablet 3    famotidine 20 MG Oral Tab Take 1 tablet (20 mg total) by mouth daily. 90 tablet 2    Amphetamine-Dextroamphet ER 15 MG Oral Capsule SR 24 Hr Take 1 capsule (15 mg total) by mouth every morning. 30 capsule 0    HYDROcodone-acetaminophen 5-325 MG Oral Tab Take 1-2 tablets by mouth every 4 (four) hours as needed for Pain. 12 tablet 0    oxybutynin ER 5 MG Oral Tablet 24 Hr Take 1 tablet (5 mg total) by mouth daily.      tamsulosin 0.4 MG Oral Cap Take 1 capsule (0.4 mg total) by mouth daily.      naproxen 500 MG Oral Tab Take 1 tablet (500 mg total) by mouth 2 (two) times daily with meals. 60 tablet 3    citalopram 20 MG Oral Tab Take 1 tablet (20 mg total) by mouth daily. 90 tablet 3    Fenofibrate 54 MG Oral Tab Take 1 tablet (54 mg total) by  mouth daily. (Patient not taking: Reported on 2024) 90 tablet 2     Past Surgical History:   Procedure Laterality Date    OTHER SURGICAL HISTORY  2009    Carpal tunnel right hand    TONSILLECTOMY        Family History   Problem Relation Age of Onset    Cancer Father     Cancer Maternal Grandmother     Cancer Maternal Grandfather     Heart Disease Neg     Stroke Neg      Social History     Socioeconomic History    Marital status:    Tobacco Use    Smoking status: Former     Packs/day: 1.00     Years: 3.00     Additional pack years: 0.00     Total pack years: 3.00     Types: Cigarettes     Quit date: 2021     Years since quittin.3    Smokeless tobacco: Never   Vaping Use    Vaping Use: Some days    Substances: Nicotine    Devices: Disposable   Substance and Sexual Activity    Alcohol use: Yes     Alcohol/week: 6.0 standard drinks of alcohol     Types: 6 Standard drinks or equivalent per week     Comment: a week    Drug use: Never   Other Topics Concern    Weight Concern Yes     Comment: cant loss weight       Review of  other systems:  10 point ROS otherwise negative    Physical examination: Carmelo is a 53 year old male not in acute distress  /86 (BP Location: Left arm, Patient Position: Sitting, Cuff Size: large)   Pulse 93   Wt 268 lb (121.6 kg)   SpO2 94%   BMI 40.16 kg/m²    The patient is awake, alert, oriented and corporative. He has a normal affect. The patient ambulates with normal gait.  HEENT: No gross lesion noted. PEERL. No icterus.  Neck and Upper Extremity: Supple. No thyromegaly or lymphadenopathy.  Weakness with abduction of the shoulder.  Motor Examination:    (R)   (L)  Deltoid:      5    4  Biceps:       5    4  Triceps:      5    5  Wrist Extension:     5    5  Wrist Flexsion:     5    5  Finger Extension:     5    5  Finger Flexsion:     5    5       Cardiovascular system: Regular rate and rhythm.    Respiratory system: Breath sounds equal bilaterally    Abdomen: Soft, nontender   Neurologic:  Cranial nerves II through XII are grossly intact.       Examination of the lower back:    Motor Examination:   (R)   (L)   Hip Abduction:   5    5   Hip Flexion:    5    5   Knee Extension:   5    5   Knee Flexion:    5    5   Ant. Tibialis:    5    5  Extensor Hallucis Longus:   5    5  Peroneals:     5    5  Gastrocsoleus:     5    5       Radiology diagnostic studies:   Cervical MRI reviewed left paracentral disc osteophyte causing moderate to severe left foraminal stenosis at C5-6  Lumbar MRI reviewed with evidence of multilevel lumbar degenerative disc disease with disc protrusion leading to severe right and moderate left foraminal stenosis    Assessment:  Encounter Diagnosis   Name Primary?    Herniated cervical disc Yes   .      Plan:     The patient is a pleasant 53-year-old with a history of cervical radicular symptoms.  Has imaging evidence of multilevel degenerative changes and foraminal stenosis.  There is also evidence of weakness on physical exam.  Recommended trial of cervical epidural steroid injection.  Additionally, will refer to neurosurgery to discuss anterior cervical decompression and fusion given advanced imaging findings.    From a low back pain perspective, patient may benefit from epidural injection at some point.  Will discuss at the time of follow-up after cervical injection.        Tres Mathis MD MPH  Pain Management

## 2024-04-08 NOTE — PATIENT INSTRUCTIONS
Refill policies:    Allow 2-3 business days for refills; controlled substances may take longer.  Contact your pharmacy at least 5 days prior to running out of medication and have them send an electronic request or submit request through the “request refill” option in your [employer] account.  Refills are not addressed on weekends; covering physicians do not authorize routine medications on weekends.  No narcotics or controlled substances are refilled after noon on Fridays or by on call physicians.  By law, narcotics must be electronically prescribed.  A 30 day supply with no refills is the maximum allowed.  If your prescription is due for a refill, you may be due for a follow up appointment.  To best provide you care, patients receiving routine medications need to be seen at least once a year.  Patients receiving narcotic/controlled substance medications need to be seen at least once every 3 months.  In the event that your preferred pharmacy does not have the requested medication in stock (e.g. Backordered), it is your responsibility to find another pharmacy that has the requested medication available.  We will gladly send a new prescription to that pharmacy at your request.    Scheduling Tests:    If your physician has ordered radiology tests such as MRI or CT scans, please contact Central Scheduling at [phone] right away to schedule the test.  Once scheduled, the [facility] Centralized Referral Team will work with your insurance carrier to obtain pre-certification or prior authorization.  Depending on your insurance carrier, approval may take 3-10 days.  It is highly recommended patients assure they have received an authorization before having a test performed.  If test is done without insurance authorization, patient may be responsible for the entire amount billed.      Precertification and Prior Authorizations:  If your physician has recommended that you have a procedure or additional testing performed the [facility]  Centralized Referral Team will contact your insurance carrier to obtain pre-certification or prior authorization.    You are strongly encouraged to contact your insurance carrier to verify that your procedure/test has been approved and is a COVERED benefit.  Although the [facility] Centralized Referral Team does its due diligence, the insurance carrier gives the disclaimer that \"Although the procedure is authorized, this does not guarantee payment.\"    Ultimately the patient is responsible for payment.   Thank you for your understanding in this matter.  Paperwork Completion:  If you require FMLA or disability paperwork for your recovery, please make sure to either drop it off or have it faxed to our office at [phone]. Be sure the form has your name and date of birth on it.  The form will be faxed to our Forms Department and they will complete it for you.  There is a 25$ fee for all forms that need to be filled out.  Please be aware there is a 10-14 day turnaround time.  You will need to sign a release of information (OLIVERIO) form if your paperwork does not come with one.  You may call the Forms Department with any questions at [phone].  Their fax number is [phone].

## 2024-04-09 ENCOUNTER — OFFICE VISIT (OUTPATIENT)
Dept: PHYSICAL THERAPY | Age: 54
End: 2024-04-09
Attending: FAMILY MEDICINE
Payer: COMMERCIAL

## 2024-04-09 ENCOUNTER — APPOINTMENT (OUTPATIENT)
Dept: PHYSICAL THERAPY | Age: 54
End: 2024-04-09
Attending: FAMILY MEDICINE
Payer: COMMERCIAL

## 2024-04-09 ENCOUNTER — TELEPHONE (OUTPATIENT)
Dept: PAIN CLINIC | Facility: CLINIC | Age: 54
End: 2024-04-09

## 2024-04-09 DIAGNOSIS — M54.12 CERVICAL RADICULITIS: Primary | ICD-10-CM

## 2024-04-09 PROCEDURE — 97110 THERAPEUTIC EXERCISES: CPT

## 2024-04-09 PROCEDURE — 97140 MANUAL THERAPY 1/> REGIONS: CPT

## 2024-04-09 NOTE — TELEPHONE ENCOUNTER
Prior Authorization for BERNARDO   initiated with [facility]  CPT codes: 57262   pending reference # [id number].  Clinical notes submitted via uploaded to chart

## 2024-04-09 NOTE — PROGRESS NOTES
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 The patient reports that he did feel looser after his last session, but \"the achy-ness is still there.     Objective:     Pain/Symptom Presentation:   Pain at rest: 2/10  Pain at worst: 5/10    HEP: Patient was issued HEP handout 4/9/23 including self-massage with lacrosse ball, to be added HEP including S/L thoracic rotation, 90-90 doorway pec stretch, and modified downward dog.     Treatment:    Therapeutic Exercise: x 15min  Sidelying thoracic rotation: 1 x 10 (B)   Doorway pec stretch (90-90): x 30\"   Modified downward dog: 1 x 10 x 3\"   1/2 foam roll against wall - (B) OH flexion, (B) ABD, and HBH (B) ER: 1 x 10 each  HEP update: Reviewed new HEP handout with new exercises and progressions, provided verbal and written instructions/cueing for proper technique and common errors/compensations as needed. Reviewed the importance of compliance with home exercise program to facilitate recovery and meet long-term goals.      Manual Therapy: x 30min  Suboccipital release: x 3min  Cervical distraction: 2 x 30\"   Cervical upglide joint mobs - C3-C7: Grade 3, 2 x 10\" each  Thoracic PA accessory joint mobs - T1-T12: Grade 3, 2 x 10\" each   Soft tissue mobilization (supine): (B) upper trap, levator, SCM, scalenes   Soft tissue mobilization (prone): (B) thoracic paraspinals, parascapular musculature  Patient education: self-massage/TPR with lacrosse  ball    Provider Interactions With Patient:   Added therapeutic exercises as documented, with cueing provided throughout performance to ensure correct technique during exercise.  Verbally confirmed the patient's next appt date and time to ensure consistency with physical therapy attendance.     Assessment: Carmelo reports compliance with his HEP. While he reports improved mobility after his last session, there hasn't been much change in his radicular symptoms. We added postural mobility exercises this date, demonstrating moderate deficits in thoracic spine extension mobility. The patient was issued an updated HEP to reflect recent progressions in exercise regimen and facilitate additional progression in mobility and strength.  The patient would benefit from continued therapy for further progression in postural mobility and radicular symptoms.     Goals:   (to be met in 12 visits)   Pt will improve NDI score from 60/100 to 30/100 to display improved ability to sleeping, sitting, housework and driving  Pt will reduce pain at its worst from 8-9/10 to 5-6/10 to allow for improved ability to sleeping, sitting, housework and driving  Pt will improve lateral flexion  to allow for improved ability to sleeping, sitting, housework and driving  Pt will be independent with home program to allow for maintenance of goals achieved in therapy.    Plan: Continue to work thoracic mobility.     Charges: MT x 2, Therex x 1         Total Timed Treatment: 45min    Total Treatment Time: 45min

## 2024-04-10 NOTE — TELEPHONE ENCOUNTER
Attempted to contact patient - unable to LVM (VM not set up).     [employer] message sent to patient.

## 2024-04-10 NOTE — TELEPHONE ENCOUNTER
Prior authorization request completed for: BERNARDO  Authorization # [id number]  Authorization dates: 4/9/2024-7/8/2024  CPT codes approved: 78448  Number of visits/dates of service approved: 1  Physician: ANGELA  Location: [facility]     Patient can be scheduled. Routed to Navigator.

## 2024-04-11 NOTE — TELEPHONE ENCOUNTER
From  Lizeth Mckinney To  Carmelo Covington Sent and Delivered  4/10/2024  1:04 PM   Last Read in MyChart  4/11/2024  8:33 AM by Camrelo Covington

## 2024-04-11 NOTE — TELEPHONE ENCOUNTER
Call transferred from PSR - patient returning call to schedule.     Patient advised of insurance approval to proceed with injections and is agreeable to scheduling. Patient scheduled for procedure, pre-procedure instructions reviewed. Patient prefers Local sedation. Reviewed sedation instructions including No Fasting and No  Required. Patient encouraged but not required to hold Naproxen for 24 hours prior to procedure. Patient verbalized understanding of instructions, no further needs at this time.      [facility]  PRE-PROCEDURE INSTRUCTIONS WITHOUT SEDATION    Procedure: BERNARDO       Appointment Date: 04/25/2024  Check-In Time: 07:00 AM    Follow-Up Date/Time w/ : 05/08/2024 @ 04:00 PM    Prior to the procedure:  Please update us prior to the procedure if you are experiencing any symptoms of infection such as cough, fever, chills, urinary symptoms, or have recently been prescribed antibiotics, have open wounds, have recently had surgery or dental procedures.    Day of Procedure:  **Drivers will be required for patients who receive prescriptions for Valium.    NO FASTING REQUIRED  Please bring your Insurance Card, Photo ID, List of Current Medications and Referral (if applicable) to your appointment.  Please park in the [employer] and follow the signs to the [facility].  Check in at [facility] ([address] [place] [address]) outpatient registration in the [facility].  Please note-No prescriptions will be written by Pain Clinic in [place] on the day of procedure. If you require a refill of medications, please contact the office 48 hours prior to your procedure.  If you have an implanted Spinal Cord or Peripheral Nerve Stimulator: Please remember to turn device off for procedure.        Medication Hold:    Number of days you need to be off for the following medications:    Aggrenox 10 days   Agrylin (Anagrelide) 10 days  Brilinta (Ticagrelor) 7 days  Imbruvica  (Ibrutinib) 3 days   Enbrel (Etanercept) 24 hours   Fragmin (Dalteparin) 24 hours   Pletal (Cilostazol) 7 days  Effient (Prasugrel) 7 days  Pradaxa 10 days  Trental 7 days  Eliquis (Apixaban) 3 days  Xarelto (Rivaroxaban) 3 days  Lovenox (Enoxaparin) 24 hours  Aspirin  Greater than 81mg but less than 325mg   5 days  325mg and greater                  7 days  Coumadin       5 days  Procedure may be cancelled if INR is elevated.   Excedrin (with aspirin) 7 days  Plavix (Clopidogrel)                            7 days    NSAIDs: 24 hours preferred      Ibuprofen (Motrin, Advil, Vicoprofen), Naproxen (Naprosyn, Aleve), Piroxcam (Feldene), Meloxicam (Mobic), Oxaprozin (Daypro), Diclofenac (Voltaren), Indomethacin (Indocin), Etodolac (Lodine), Nabumetone (Relafen), Celebrex (Celecoxib)           HERBAL SUPPLEMENTS  5 days preferred  Fish oil, krill oil, Omega-3, Vascepa, Vitamin E, Turmeric, Garlic                       Insurance Authorization:   Most insurances are now requiring a preauthorization for all procedures.  In the event that your insurance does not authorize your procedure within 48 hours of the scheduled date, your procedure will be cancelled and rescheduled to a later date.  Please contact your insurance carrier to determine what your financial responsibility will be for the procedure(s).      Cancellation/Rescheduling Appointment:   In the event you need to cancel or reschedule your appointment, you must notify the office 24 hours prior.    Post-procedure instructions:        Please schedule a follow up visit within 2 to 4 weeks after your last procedure date   Please call our office with any questions or concerns before or after your procedure at  [phone].  If you are a diabetic, please increase the frequency of your glucose monitoring after the procedure as this may cause a temporary increase in your blood sugar.  Contact your primary care physician if your blood sugar rises as you may require some  medication adjustment.  It is normal to have increased pain at injection site for up to 3-5 days after procedure, you can use heat or ice (20 minutes on 20 minutes off) for comfort.    **To hear a recorded version of these instructions, please call [phone] and follow the prompts.  **Para escuchar las instrucciones en Español, por favor de llamar el elayne [phone] opción 4.

## 2024-04-17 NOTE — PROGRESS NOTES
Date of Service: 4/18/2024  Dx: Stenosis of cervical spine with myelopathy (HCC) (M48.02,G99.2), Paresthesia and pain of both upper extremities (R20.2,M79.601,M79.602)           Insurance: [facility] CHOICE/HMO/POS/EPO  Insurance Limits: 30 visit limit  Visit #: 6  Authorized # of Visits: 8 recommended  POC/Auth Expiration: N/A  Date of Last PN: 2/20/24 (Visit #1)  Authorizing Physician/Provider: Radha Thornton MD visit: 4/8/24  Fall Risk: Standard         Precautions: None            Subjective: \"It's been a rough 6 days. Last night I did break down and take one of my wife's muscle relaxers. I was hoping it would take some of it away, but it did not. It is just this unbelievable aching and burning in my shoulder and straight across my shoulder blades.\" The patient reports that the wall exercises caused some soreness, \"but it's not like this kind of sore.\"     The patient reports that numbness in his hands \"seems to have been better. I notice that when I'm sleeping. Usually it's the soreness plus the numbness, but now it's just super sore.\"     The patient reports that he has been doing the self-massage on his own at home and \"that feels good but it doesn't last for any significant length of time.\"     Objective:     Pain/Symptom Presentation:   Pain at rest: 9/10 (post-tx: 2/10)  Pain at worst: 9/10    HEP: Patient was issued HEP handout 4/9/23 including self-massage with lacrosse ball, to be added HEP including S/L thoracic rotation, 90-90 doorway pec stretch, and modified downward dog.     Treatment:    Therapeutic Exercise: x 2min  Manual upper trap stretch: 2 x 30\" (B)     Manual Therapy: x 38min  Suboccipital release: x 3min  Cervical distraction: 2 x 30\"   Cervical upglide joint mobs - C3-C7: Grade 3, 2 x 10\" each  Thoracic PA accessory joint mobs - T1-T12: Grade 3, 4 x 10\" each   Soft tissue mobilization (supine): (B) upper trap, levator, SCM, scalenes   Soft tissue mobilization (prone): (B) thoracic  paraspinals, parascapular musculature  IASTM (prone): (B) mid and low trap, rhomboids, thoracic paraspinals, teres, lats   IASTM (seated): (B) cervical paraspinals, upper trap, levator    Provider Interactions With Patient:   Modified today's session based on increased pain complaints.   Verbally confirmed the patient's next appt date and time to ensure consistency with physical therapy attendance.   To ensure the availability of appts and consistency with providing clinician(s), the patient was advised to schedule additional appts.    Assessment: Carmelo arrived today with reports of increased neck and arm pain. He was with pain with most active movements at his shoulders and was with restrictions for reaching overhead. We focused today's treatment on pain management, performing additional manual therapy interventions. IASTM was used for the associated soft tissue and surrounding musculature. The patient's pain was significantly improved by the end of the session from 9/10 improved to 2/10. The patient was also with improved ability to raise his arms overhead by the end of the appt. The patient would benefit from continued therapy for further progression in cervical and thoracic mobility.    Goals:   (to be met in 12 visits)   Pt will improve NDI score from 60/100 to 30/100 to display improved ability to sleeping, sitting, housework and driving  Pt will reduce pain at its worst from 8-9/10 to 5-6/10 to allow for improved ability to sleeping, sitting, housework and driving  Pt will improve lateral flexion  to allow for improved ability to sleeping, sitting, housework and driving  Pt will be independent with home program to allow for maintenance of goals achieved in therapy.    Plan: Follow up on pain management.     Charges: MT x 3      Total Timed Treatment: 40min    Total Treatment Time: 40min

## 2024-04-18 ENCOUNTER — OFFICE VISIT (OUTPATIENT)
Dept: PHYSICAL THERAPY | Age: 54
End: 2024-04-18
Attending: FAMILY MEDICINE
Payer: COMMERCIAL

## 2024-04-18 PROCEDURE — 97140 MANUAL THERAPY 1/> REGIONS: CPT

## 2024-04-25 ENCOUNTER — APPOINTMENT (OUTPATIENT)
Dept: GENERAL RADIOLOGY | Facility: HOSPITAL | Age: 54
End: 2024-04-25
Attending: ANESTHESIOLOGY
Payer: COMMERCIAL

## 2024-04-25 ENCOUNTER — HOSPITAL ENCOUNTER (OUTPATIENT)
Facility: HOSPITAL | Age: 54
Setting detail: HOSPITAL OUTPATIENT SURGERY
Discharge: HOME | End: 2024-04-25
Attending: ANESTHESIOLOGY | Admitting: ANESTHESIOLOGY
Payer: COMMERCIAL

## 2024-04-25 VITALS
RESPIRATION RATE: 18 BRPM | WEIGHT: 268 LBS | SYSTOLIC BLOOD PRESSURE: 130 MMHG | OXYGEN SATURATION: 98 % | TEMPERATURE: 98 F | HEART RATE: 64 BPM | BODY MASS INDEX: 40.15 KG/M2 | HEIGHT: 68.5 IN | DIASTOLIC BLOOD PRESSURE: 81 MMHG

## 2024-04-25 PROCEDURE — 62321 NJX INTERLAMINAR CRV/THRC: CPT | Performed by: ANESTHESIOLOGY

## 2024-04-25 PROCEDURE — 3E0U33Z INTRODUCTION OF ANTI-INFLAMMATORY INTO JOINTS, PERCUTANEOUS APPROACH: ICD-10-PCS | Performed by: ANESTHESIOLOGY

## 2024-04-25 RX ORDER — LIDOCAINE HYDROCHLORIDE 10 MG/ML
INJECTION, SOLUTION EPIDURAL; INFILTRATION; INTRACAUDAL; PERINEURAL
Status: DISCONTINUED | OUTPATIENT
Start: 2024-04-25 | End: 2024-04-25

## 2024-04-25 RX ORDER — SODIUM CHLORIDE 9 MG/ML
INJECTION, SOLUTION INTRAMUSCULAR; INTRAVENOUS; SUBCUTANEOUS
Status: DISCONTINUED | OUTPATIENT
Start: 2024-04-25 | End: 2024-04-25

## 2024-04-25 RX ORDER — DEXAMETHASONE SODIUM PHOSPHATE 10 MG/ML
INJECTION, SOLUTION INTRAMUSCULAR; INTRAVENOUS
Status: DISCONTINUED | OUTPATIENT
Start: 2024-04-25 | End: 2024-04-25

## 2024-04-25 NOTE — DISCHARGE INSTRUCTIONS
[facility] Medicine History & Physical      Patient Name: Justine Gotti    : 10/16/1930    PCP: DENIZ Olguin CNP    Date of Service:  Patient seen and examined on 2023     Chief Complaint:  Abdominal pain, vomiting    History Of Present Illness:    Justine Gotti is a 82 y.o. female who presented to ED for evaluation of nonradiating, epigastric abdominal pain and vomiting which began this morning. Patient describes pain as a constant fullness/swelling. She reports she has had multiple episodes of vomiting. She denies any known aggravating or alleviating factors. She denies hematemesis or coffee-ground emesis. She denies melena or hematochezia. She denies diarrhea, constipation, urinary symptoms. She has a history of appendectomy, cholecystectomy, and hysterectomy. She denies fever, dizziness, chest pain, shortness of breath. Past Medical History:    Patient has a past medical history of Arthritis, CAD (coronary artery disease), Cancer ([address]), Cystocele, Diabetes mellitus ([address]), Hepatitis A, History of blood transfusion, Hyperlipidemia, PVC (premature ventricular contraction), Rectocele, Reflux, and Scoliosis. Past Surgical History:    Patient has a past surgical history that includes Appendectomy; joint replacement (Left); hernia repair (6 YRS AGO); Cholecystectomy; shoulder surgery (Right, 12); Bunionectomy (12); Breast surgery; Upper gastrointestinal endoscopy (12); Hysterectomy; bladder suspension; Dilation and curettage of uterus; other surgical history (Left, 14); Foot surgery; Cardiac catheterization; Colonoscopy (); Cystocopy (2017); other surgical history (Right, 2018); Cataract removal with implant (Left, 2018); pr xcapsl ctrc rmvl insj io lens prosth w/o ecp (Left, 2018); and Lumbar spine surgery (Right, 5/15/2019).     Medications Prior to Admission:      Prior to Admission medications    Medication Sig Home Care Instructions Following Your Pain Procedure     Carmelo,  It has been a pleasure to have you as our patient. To help you at home, you must follow these general discharge instructions. We will review these with you before you are discharged. It is our hope that you have a complete and uneventful recovery from our procedure.     General Instructions:  What to Expect:  Bandages from your procedure today can be removed when you get home.  Please avoid soaking and/or swimming for 24 hours.  Showering is okay  It is normal to have increased pain symptoms and/or pain at injection site for up to 3-5 days after procedure, you can use heat or ice (20 minutes on 20 minutes off) for comfort.  You may experience some temporary side effects which may include restlessness or insomnia, flushing of the face, or heart palpitations.  Please contact the provider if these symptoms do not resolve within 3-4 days.  Lightheadedness or nausea may occur and should resolve within 24 to 48 hours.  If you develop a headache after treatment, rest, drink fluids (with caffeine, if possible) and take mild over-the-counter pain medication.  If the headache does not improve with the above treatment, contact the physician.  Home Medications:  Resume all previously prescribed medication.  Please avoid taking NSAIDs (Non-Steriodal Anti-Inflammatory Drugs) such as:  Ibuprofen ( Advil, Motrin) Aleve (Naproxen), Diclofenac, Meloxicam for 6 hours after procedure.   If you are on Coumadin (Warfarin) or any other anti-coagulant (or \"blood thinning\") medication such as Plavix (Clopidogrel), Xarelto (Rivaroxaban), Eliquis (Apixaban), Effient (Prasugrel) etc., restart on the following day from the procedure unless otherwise directed by your provider.  If you are a diabetic, please increase the frequency of your glucose monitoring after the procedure as steroids may cause a temporary (2-3 day) increase in your blood sugar.  Contact your primary care  Patient's bed alarm was going off so another nurse went to check on the patient. The patient was getting out of bed and was starting to make a bed on the floor and he preceded to lay down to sleep in it. I was notified about the patient and went and educated the patient on the safety of alarms. Patient continued to refuse alarms. Alarms were then turned off because patient was becoming agitated. The patient is aware that we will be checking in on him frequently. Will continue to monitor patient.      Start Date End Date Taking? Authorizing Provider   omeprazole (PRILOSEC) 40 MG delayed release capsule Take 1 capsule by mouth daily   Yes Historical Provider, MD   magnesium oxide (MAG-OX) 400 (240 Mg) MG tablet Take 1 tablet by mouth daily   Yes Historical Provider, MD   oxyCODONE-acetaminophen (PERCOCET)  MG per tablet Take 1 tablet by mouth every 8 hours as needed for Pain. Historical Provider, MD   Calcium Carb-Cholecalciferol (CALCIUM+D3) 500-600 MG-UNIT TABS Take 1 tablet by mouth in the morning, at noon, and at bedtime    Historical Provider, MD   aspirin 81 MG chewable tablet Take 1 tablet by mouth daily    Historical Provider, MD   CRANBERRY PO Take 500 mg by mouth in the morning and at bedtime    Historical Provider, MD   furosemide (LASIX) 20 MG tablet Take 1 tablet by mouth daily 1/17/22 7/2/23  Jeff Longo MD   saline nasal gel (AYR) GEL by Nasal route as needed for Congestion    Historical Provider, MD   ascorbic acid (VITAMIN C) 1000 MG tablet Take 1 tablet by mouth daily Takes daily along with zinc    Historical Provider, MD   Pseudoephedrine-guaiFENesin (GUAIFENESIN 600/ PO) Take by mouth  Patient not taking: Reported on 7/2/2023    Historical Provider, MD   atorvastatin (LIPITOR) 40 MG tablet Take 1 tablet by mouth nightly  Patient not taking: Reported on 7/2/2023 6/4/19   Barbara Moreno MD   esomeprazole Magnesium (NEXIUM) 20 MG PACK Take 20 mg by mouth daily Took 20 mg Nexium in am and 20 Mg Pepcid at bedtime.   Patient not taking: Reported on 7/2/2023    Historical Provider, MD   Multiple Vitamins-Minerals (OCUVITE ADULT FORMULA PO) Take 1 tablet by mouth daily    Historical Provider, MD   docusate sodium (COLACE) 100 MG capsule Take 1 capsule by mouth 2 times daily    Historical Provider, MD   Cholecalciferol (VITAMIN D3) 2000 UNITS CAPS Take 2,000 Units by mouth daily  Patient not taking: Reported on 7/2/2023    Historical Provider, MD   ondansetron (ZOFRAN ODT) 4 MG physician if your blood sugar remains elevated as you may require some medication adjustment.  Diet:  Resume your regular diet as tolerated.  Activity:  We recommend that you relax and rest during the rest of your procedure day.  If you feel weakness in your arms or legs do not drive.  Follow-up Appointment  Please schedule a follow-up visit within 3 to 4 weeks after your last procedure date.  Question or Concerns:  Feel free to call our office with any questions or concerns at [phone] (option #2)    Carmelo  Thank you for coming to [facility] for your procedure.  The nurses try very hard to make sure you receive the best care possible.  Your trust in them as well as us is greatly appreciated.    Thanks so much,   Dr. Tres Mathis

## 2024-04-25 NOTE — OPERATIVE REPORT
[facility]  Operative Report  2024     Carmelo Covington Patient Status:  Hospital Outpatient Surgery    1970 MRN [id number]   Location [facility] PAIN CENTER Attending Tres Mathis MD   Hosp Day # 0 PCP Rui Garcia DO     Indication: Carmelo is a 53 year old male cervical radiculitis    Preoperative Diagnosis:  Cervical radiculitis [M54.12]    Postoperative Diagnosis: Same as above.    Procedure performed: CERVICAL EPIDURAL STEROID INJECTION with local    Anesthesia: Local     EBL: Less than 1 ml.    Procedure Description:  After reviewing the patient's history and performing a focused physical examination, the diagnosis was confirmed and contraindications such as infection and coagulopathy were ruled out.  Following review of allergies, potential side effects, and complications, including but not necessarily limited to infection, allergic reaction, local tissue breakdown, nerve injury, post-dural puncture headache and paresis, the patient indicated they understood and agreed to proceed.  After obtaining the informed consent, the patient was brought to the procedure room and monitored.      The patient was brought to the procedure room and positioned prone.  After comprehensive monitors were applied, the patient's neck was prepped and draped sterilely.  After local anesthetic was instilled in the skin and subcutaneous tissue, a 20-gauge Tuohy needle was introduced and advanced under fluoroscopy at C7-T1.  The epidural space was reached by using a loss of resistance to air technique. There was no C.S.F. or blood through the needle. After obtaining a good epidurogram by injecting Omnipaque 180 1 mL, a combination of normal saline and dexamethasone 10 mg in total volume of 4 mL was injected.  The needle was then flushed with normal saline 1 mL.  The stylet re-applied.  The needle was withdrawn with the tip intact.  The patient tolerated the procedure very well and recovered and was  discharged to a responsible adult after discharge criteria were met.        Complications: None.    Follow up:  The patient was followed in the pain clinic as needed basis.          Tres Mathis MD

## 2024-04-25 NOTE — H&P
History & Physical Examination    Patient Name: Carmelo Covington  MRN: [id number]  CSN: [id number]  YOB: 1970    Pre-Operative Diagnosis:  Cervical radiculitis [M54.12]    Present Illness: Cervical radiculitis    Medications Prior to Admission   Medication Sig Dispense Refill Last Dose    naproxen 500 MG Oral Tab Take 1 tablet (500 mg total) by mouth 2 (two) times daily with meals. 60 tablet 3 Past Month    levothyroxine 125 MCG Oral Tab Take 1 tablet (125 mcg total) by mouth before breakfast. 90 tablet 3     famotidine 20 MG Oral Tab Take 1 tablet (20 mg total) by mouth daily. 90 tablet 2     Amphetamine-Dextroamphet ER 15 MG Oral Capsule SR 24 Hr Take 1 capsule (15 mg total) by mouth every morning. 30 capsule 0     HYDROcodone-acetaminophen 5-325 MG Oral Tab Take 1-2 tablets by mouth every 4 (four) hours as needed for Pain. 12 tablet 0     [] ondansetron 4 MG Oral Tablet Dispersible Take 1 tablet (4 mg total) by mouth every 4 (four) hours as needed for Nausea. 10 tablet 0     oxybutynin ER 5 MG Oral Tablet 24 Hr Take 1 tablet (5 mg total) by mouth daily.       tamsulosin 0.4 MG Oral Cap Take 1 capsule (0.4 mg total) by mouth daily.       citalopram 20 MG Oral Tab Take 1 tablet (20 mg total) by mouth daily. 90 tablet 3     Fenofibrate 54 MG Oral Tab Take 1 tablet (54 mg total) by mouth daily. (Patient not taking: Reported on 2024) 90 tablet 2      No current facility-administered medications for this encounter.       Allergies: No Known Allergies    Past Medical History:    Allergic rhinitis    Anxiety    Carpal tunnel syndrome on both sides    Esophageal reflux    Hypothyroid    Hypothyroidism    Obesity     Past Surgical History:   Procedure Laterality Date    Other surgical history  2009    Carpal tunnel right hand    Tonsillectomy       Family History   Problem Relation Age of Onset    Cancer Father     Cancer Maternal Grandmother     Cancer Maternal Grandfather     Heart Disease Neg      Stroke Neg      Social History     Tobacco Use    Smoking status: Former     Current packs/day: 0.00     Average packs/day: 1 pack/day for 3.0 years (3.0 ttl pk-yrs)     Types: Cigarettes     Start date: 2018     Quit date: 2021     Years since quittin.4    Smokeless tobacco: Never   Substance Use Topics    Alcohol use: Yes     Alcohol/week: 6.0 standard drinks of alcohol     Types: 6 Standard drinks or equivalent per week     Comment: a week       SYSTEM Check if Review is Normal Check if Physical Exam is Normal If not normal, please explain:   HEENT [x ] [x ]    NECK & BACK [x ] [x ]    HEART [x ] [x ]    LUNGS [x ] [x ]    ABDOMEN [x ] [x ]    UROGENITAL [x ] [x ]    EXTREMITIES [x ] [x ]    OTHER        [ x ] I have discussed the risks and benefits and alternatives with the patient/family.  They understand and agree to proceed with plan of care.  [ x ] I have reviewed the History and Physical done within the last 30 days.  Any changes noted above.    Tres Mathis MD

## 2024-04-26 ENCOUNTER — TELEPHONE (OUTPATIENT)
Dept: PAIN CLINIC | Facility: CLINIC | Age: 54
End: 2024-04-26

## 2024-04-26 NOTE — TELEPHONE ENCOUNTER
Lori called placed to patient for post procedure follow up. Patient stated it went well. Pt verbalized understanding to call with any questions or concerns.      Procedure: BERNARDO  Date: 4/25/2024  Follow up Visit Scheduled: 5/8/2024 with

## 2024-04-30 DIAGNOSIS — F90.0 ATTENTION DEFICIT HYPERACTIVITY DISORDER (ADHD), PREDOMINANTLY INATTENTIVE TYPE: ICD-10-CM

## 2024-04-30 RX ORDER — DEXTROAMPHETAMINE SACCHARATE, AMPHETAMINE ASPARTATE MONOHYDRATE, DEXTROAMPHETAMINE SULFATE AND AMPHETAMINE SULFATE 3.75; 3.75; 3.75; 3.75 MG/1; MG/1; MG/1; MG/1
15 CAPSULE, EXTENDED RELEASE ORAL EVERY MORNING
Qty: 30 CAPSULE | Refills: 0 | Status: SHIPPED | OUTPATIENT
Start: 2024-04-30 | End: 2024-05-30

## 2024-04-30 NOTE — TELEPHONE ENCOUNTER
[id number] - [place], [place] - [address] AT [facility] [address], [phone], [phone]   [address] [address]   Phone: [phone] Fax: [phone]   Hours: Not open 24 hours       PATIENT CALLING THIS AFTERNOON. HE REQUESTS REFILL FOR ADDERALL ER 15MG QD

## 2024-05-02 ENCOUNTER — APPOINTMENT (OUTPATIENT)
Dept: PHYSICAL THERAPY | Age: 54
End: 2024-05-02
Attending: FAMILY MEDICINE
Payer: COMMERCIAL

## 2024-05-02 ENCOUNTER — TELEPHONE (OUTPATIENT)
Dept: PHYSICAL THERAPY | Facility: HOSPITAL | Age: 54
End: 2024-05-02

## 2024-05-08 ENCOUNTER — OFFICE VISIT (OUTPATIENT)
Dept: PAIN CLINIC | Facility: CLINIC | Age: 54
End: 2024-05-08
Payer: COMMERCIAL

## 2024-05-08 VITALS — HEART RATE: 97 BPM | DIASTOLIC BLOOD PRESSURE: 88 MMHG | OXYGEN SATURATION: 95 % | SYSTOLIC BLOOD PRESSURE: 144 MMHG

## 2024-05-08 DIAGNOSIS — M50.20 HERNIATED CERVICAL DISC: Primary | ICD-10-CM

## 2024-05-08 PROCEDURE — 99214 OFFICE O/P EST MOD 30 MIN: CPT | Performed by: ANESTHESIOLOGY

## 2024-05-08 NOTE — PATIENT INSTRUCTIONS
Refill policies:    Allow 2-3 business days for refills; controlled substances may take longer.  Contact your pharmacy at least 5 days prior to running out of medication and have them send an electronic request or submit request through the “request refill” option in your [employer] account.  Refills are not addressed on weekends; covering physicians do not authorize routine medications on weekends.  No narcotics or controlled substances are refilled after noon on Fridays or by on call physicians.  By law, narcotics must be electronically prescribed.  A 30 day supply with no refills is the maximum allowed.  If your prescription is due for a refill, you may be due for a follow up appointment.  To best provide you care, patients receiving routine medications need to be seen at least once a year.  Patients receiving narcotic/controlled substance medications need to be seen at least once every 3 months.  In the event that your preferred pharmacy does not have the requested medication in stock (e.g. Backordered), it is your responsibility to find another pharmacy that has the requested medication available.  We will gladly send a new prescription to that pharmacy at your request.    Scheduling Tests:    If your physician has ordered radiology tests such as MRI or CT scans, please contact Central Scheduling at [phone] right away to schedule the test.  Once scheduled, the [facility] Centralized Referral Team will work with your insurance carrier to obtain pre-certification or prior authorization.  Depending on your insurance carrier, approval may take 3-10 days.  It is highly recommended patients assure they have received an authorization before having a test performed.  If test is done without insurance authorization, patient may be responsible for the entire amount billed.      Precertification and Prior Authorizations:  If your physician has recommended that you have a procedure or additional testing performed the [facility]  Centralized Referral Team will contact your insurance carrier to obtain pre-certification or prior authorization.    You are strongly encouraged to contact your insurance carrier to verify that your procedure/test has been approved and is a COVERED benefit.  Although the [facility] Centralized Referral Team does its due diligence, the insurance carrier gives the disclaimer that \"Although the procedure is authorized, this does not guarantee payment.\"    Ultimately the patient is responsible for payment.   Thank you for your understanding in this matter.  Paperwork Completion:  If you require FMLA or disability paperwork for your recovery, please make sure to either drop it off or have it faxed to our office at [phone]. Be sure the form has your name and date of birth on it.  The form will be faxed to our Forms Department and they will complete it for you.  There is a 25$ fee for all forms that need to be filled out.  Please be aware there is a 10-14 day turnaround time.  You will need to sign a release of information (OLIVERIO) form if your paperwork does not come with one.  You may call the Forms Department with any questions at [phone].  Their fax number is [phone].

## 2024-05-08 NOTE — PROGRESS NOTES
Name: Carmelo Covington   : 1970   DOS: 2024     Pain Clinic Follow Up Visit:   No chief complaint on file.      Carmelo Covington is a 53 year old male with a history of cervical radiculitis here for follow-up after cervical epidural steroid injection.  Overall, patient is significantly improved with 80% improvement in pain.  Overall, he is very happy with results of the injection.    Pt denies any chills, fever, or weakness. There is no bladder or bowel incontinence associated with the pain.    REVIEW OF SYSTEMS:  A ten point review of systems was performed with pertinent positives and negatives in the HPI.    No Known Allergies    Current Outpatient Medications   Medication Sig Dispense Refill    Amphetamine-Dextroamphet ER 15 MG Oral Capsule SR 24 Hr Take 1 capsule (15 mg total) by mouth every morning. 30 capsule 0    levothyroxine 125 MCG Oral Tab Take 1 tablet (125 mcg total) by mouth before breakfast. 90 tablet 3    famotidine 20 MG Oral Tab Take 1 tablet (20 mg total) by mouth daily. 90 tablet 2    HYDROcodone-acetaminophen 5-325 MG Oral Tab Take 1-2 tablets by mouth every 4 (four) hours as needed for Pain. 12 tablet 0    tamsulosin 0.4 MG Oral Cap Take 1 capsule (0.4 mg total) by mouth daily.      naproxen 500 MG Oral Tab Take 1 tablet (500 mg total) by mouth 2 (two) times daily with meals. 60 tablet 3    citalopram 20 MG Oral Tab Take 1 tablet (20 mg total) by mouth daily. 90 tablet 3    Fenofibrate 54 MG Oral Tab Take 1 tablet (54 mg total) by mouth daily. (Patient not taking: Reported on 2024) 90 tablet 2         EXAM:   /88 (BP Location: Left arm, Patient Position: Sitting)   Pulse 97   SpO2 95%   General:  Patient is a(n) 53 year old year old male in no acute distress.  Neurologic:: WNL-Orientation to time, place and person, normal mood & affect, concentration & attention span intact.   Inspection:  Ambulates with well-coordinated, fluid, non-antalgic gait.  Gait is normal.  Neck: Full  range of motion.  Injection site clear  Cranial nerve: Grossly intact  Respiratory: Nonlabored  Back: Gait intact    IMAGES:     Cervical MRI reviewed with the patient with evidence of paracentral disc bulge leading to foraminal stenosis    ASSESSMENT AND PLAN:     1. Herniated cervical disc        The patient is a pleasant 53-year-old gentleman with a history of cervical radiculitis.  He is doing very well after cervical epidural steroid injection.  Has near resolution of symptoms.  Rates the pain as 1 out of 10.  Reviewed his imaging in detail discussed anatomy pathophysiology and answered all patient related questions.  Patient can follow-up on as-needed basis.    Radiology orders and consultations:None  The patient indicates understanding of these issues and agrees to the plan.  No follow-ups on file.    Tres Mathis MD, 5/8/2024, 4:11 PM

## 2024-05-08 NOTE — PROGRESS NOTES
Last procedure:   CERVICAL EPIDURAL STEROID INJECTION with local     Date: 4/25/24  Percentage of relief obtained: 80%  Duration of relief: Consistent    Current Pain Score: 1    Narcotic Contract Exp: NA

## 2024-05-09 ENCOUNTER — APPOINTMENT (OUTPATIENT)
Dept: PHYSICAL THERAPY | Age: 54
End: 2024-05-09
Payer: COMMERCIAL

## 2024-05-16 ENCOUNTER — APPOINTMENT (OUTPATIENT)
Dept: PHYSICAL THERAPY | Age: 54
End: 2024-05-16
Attending: FAMILY MEDICINE
Payer: COMMERCIAL

## 2024-05-17 RX ORDER — FAMOTIDINE 20 MG/1
20 TABLET, FILM COATED ORAL DAILY
Qty: 90 TABLET | Refills: 3 | Status: SHIPPED | OUTPATIENT
Start: 2024-05-17 | End: 2024-05-20

## 2024-05-17 NOTE — TELEPHONE ENCOUNTER
Gastrointestional Medication Protocol Passed05/17/2024 01:56 PM   Protocol Details In person appointment or virtual visit in the past 12 mos or appointment in next 3 mos      Refilled per protocol  famotidine 20 MG Oral Tab   Last refilled on 3/19/24 #90 with 2 rf.  LOV- 3/7/24  Last labs- 3/15/24    Sent to pharmacy

## 2024-05-20 RX ORDER — FAMOTIDINE 20 MG/1
20 TABLET, FILM COATED ORAL DAILY
Qty: 90 TABLET | Refills: 3 | Status: SHIPPED | OUTPATIENT
Start: 2024-05-20

## 2024-05-23 ENCOUNTER — APPOINTMENT (OUTPATIENT)
Dept: PHYSICAL THERAPY | Age: 54
End: 2024-05-23
Payer: COMMERCIAL

## 2024-05-28 ENCOUNTER — APPOINTMENT (OUTPATIENT)
Dept: PHYSICAL THERAPY | Age: 54
End: 2024-05-28
Payer: COMMERCIAL

## 2024-06-04 ENCOUNTER — APPOINTMENT (OUTPATIENT)
Dept: PHYSICAL THERAPY | Age: 54
End: 2024-06-04
Payer: COMMERCIAL

## 2024-06-18 DIAGNOSIS — F90.0 ATTENTION DEFICIT HYPERACTIVITY DISORDER (ADHD), PREDOMINANTLY INATTENTIVE TYPE: ICD-10-CM

## 2024-06-18 RX ORDER — DEXTROAMPHETAMINE SACCHARATE, AMPHETAMINE ASPARTATE MONOHYDRATE, DEXTROAMPHETAMINE SULFATE AND AMPHETAMINE SULFATE 3.75; 3.75; 3.75; 3.75 MG/1; MG/1; MG/1; MG/1
15 CAPSULE, EXTENDED RELEASE ORAL EVERY MORNING
Qty: 30 CAPSULE | Refills: 0 | Status: SHIPPED | OUTPATIENT
Start: 2024-06-18 | End: 2024-07-18

## 2024-07-24 DIAGNOSIS — Z00.00 ROUTINE HEALTH MAINTENANCE: ICD-10-CM

## 2024-07-24 DIAGNOSIS — F90.0 ATTENTION DEFICIT HYPERACTIVITY DISORDER (ADHD), PREDOMINANTLY INATTENTIVE TYPE: ICD-10-CM

## 2024-07-24 RX ORDER — CITALOPRAM 20 MG/1
20 TABLET ORAL DAILY
Qty: 90 TABLET | Refills: 3 | Status: SHIPPED | OUTPATIENT
Start: 2024-07-24

## 2024-07-24 RX ORDER — DEXTROAMPHETAMINE SACCHARATE, AMPHETAMINE ASPARTATE MONOHYDRATE, DEXTROAMPHETAMINE SULFATE AND AMPHETAMINE SULFATE 3.75; 3.75; 3.75; 3.75 MG/1; MG/1; MG/1; MG/1
15 CAPSULE, EXTENDED RELEASE ORAL EVERY MORNING
Qty: 30 CAPSULE | Refills: 0 | Status: SHIPPED | OUTPATIENT
Start: 2024-07-24 | End: 2024-07-29 | Stop reason: DRUGHIGH

## 2024-07-24 NOTE — TELEPHONE ENCOUNTER
Patient calling to request refill of Amphetamine-Dextroamphet ER 15 MG Oral Capsule SR 24 Hr AND citalopram 20 MG Oral Tab   Pharmacy [facility] #[id number] - [place], [place] - [address] AT [facility] [address], [phone], [phone] [[id number]]

## 2024-07-29 ENCOUNTER — OFFICE VISIT (OUTPATIENT)
Dept: FAMILY MEDICINE CLINIC | Facility: CLINIC | Age: 54
End: 2024-07-29
Payer: COMMERCIAL

## 2024-07-29 VITALS
BODY MASS INDEX: 40 KG/M2 | OXYGEN SATURATION: 96 % | DIASTOLIC BLOOD PRESSURE: 92 MMHG | RESPIRATION RATE: 16 BRPM | HEART RATE: 108 BPM | SYSTOLIC BLOOD PRESSURE: 142 MMHG | TEMPERATURE: 97 F | WEIGHT: 268.5 LBS

## 2024-07-29 DIAGNOSIS — K21.00 GASTROESOPHAGEAL REFLUX DISEASE WITH ESOPHAGITIS WITHOUT HEMORRHAGE: ICD-10-CM

## 2024-07-29 DIAGNOSIS — F90.0 ATTENTION DEFICIT HYPERACTIVITY DISORDER (ADHD), PREDOMINANTLY INATTENTIVE TYPE: ICD-10-CM

## 2024-07-29 DIAGNOSIS — R73.09 ELEVATED GLUCOSE: Primary | ICD-10-CM

## 2024-07-29 PROBLEM — G47.30 SLEEP APNEA, UNSPECIFIED: Status: ACTIVE | Noted: 2024-07-29

## 2024-07-29 PROBLEM — E78.2 MIXED HYPERLIPIDEMIA: Status: ACTIVE | Noted: 2024-07-29

## 2024-07-29 LAB
CUVETTE LOT #: 683 NUMERIC
HEMOGLOBIN: 6 G/DL (ref 13–17)

## 2024-07-29 RX ORDER — FAMOTIDINE 20 MG/1
20 TABLET, FILM COATED ORAL 2 TIMES DAILY
COMMUNITY

## 2024-07-29 RX ORDER — CITALOPRAM 40 MG/1
40 TABLET ORAL DAILY
Qty: 30 TABLET | Refills: 1 | Status: SHIPPED | OUTPATIENT
Start: 2024-07-29

## 2024-07-29 RX ORDER — OXYBUTYNIN CHLORIDE 5 MG/1
5 TABLET, EXTENDED RELEASE ORAL DAILY
COMMUNITY
Start: 2024-06-26

## 2024-07-29 RX ORDER — DEXTROAMPHETAMINE SACCHARATE, AMPHETAMINE ASPARTATE MONOHYDRATE, DEXTROAMPHETAMINE SULFATE AND AMPHETAMINE SULFATE 7.5; 7.5; 7.5; 7.5 MG/1; MG/1; MG/1; MG/1
30 CAPSULE, EXTENDED RELEASE ORAL EVERY MORNING
COMMUNITY
Start: 2024-07-29

## 2024-07-29 NOTE — PROGRESS NOTES
Carmelo Covington is a 54 year old male.  HPI:   Carmelo is having issues with his ADD meds the 15 mg isn't working well any more, some days he can't tell if he took it at all. Also the citalopram is not working well for his anxiety has been on 20 mg for some time now. Also was told he was prediabetic, and needs to see someone about that and weight loss. Had a CMP done in March and his glucose 156.  He denies any polydypsia, polyuria, also his famotidine is not working any longer.     Current Outpatient Medications   Medication Sig Dispense Refill    Cholecalciferol 50 MCG (2000 UT) Oral Tab Take by mouth.      oxybutynin ER 5 MG Oral Tablet 24 Hr Take 1 tablet (5 mg total) by mouth daily.      citalopram 20 MG Oral Tab Take 1 tablet (20 mg total) by mouth daily. 90 tablet 3    Amphetamine-Dextroamphet ER 15 MG Oral Capsule SR 24 Hr Take 1 capsule (15 mg total) by mouth every morning. 30 capsule 0    famotidine 20 MG Oral Tab Take 1 tablet (20 mg total) by mouth daily. 90 tablet 3    levothyroxine 125 MCG Oral Tab Take 1 tablet (125 mcg total) by mouth before breakfast. 90 tablet 3    HYDROcodone-acetaminophen 5-325 MG Oral Tab Take 1-2 tablets by mouth every 4 (four) hours as needed for Pain. 12 tablet 0    tamsulosin 0.4 MG Oral Cap Take 1 capsule (0.4 mg total) by mouth daily.      naproxen 500 MG Oral Tab Take 1 tablet (500 mg total) by mouth 2 (two) times daily with meals. 60 tablet 3    Fenofibrate 54 MG Oral Tab Take 1 tablet (54 mg total) by mouth daily. (Patient not taking: Reported on 1/11/2024) 90 tablet 2      Past Medical History:    Allergic rhinitis    Anxiety    Carpal tunnel syndrome on both sides    Esophageal reflux    Hypothyroid    Hypothyroidism    Obesity      Social History:  Social History     Socioeconomic History    Marital status:    Tobacco Use    Smoking status: Former     Current packs/day: 0.00     Average packs/day: 1 pack/day for 3.0 years (3.0 ttl pk-yrs)     Types: Cigarettes      Start date: 2018     Quit date: 2021     Years since quittin.6    Smokeless tobacco: Never   Vaping Use    Vaping status: Some Days    Substances: Nicotine    Devices: Disposable   Substance and Sexual Activity    Alcohol use: Yes     Alcohol/week: 6.0 standard drinks of alcohol     Types: 6 Standard drinks or equivalent per week     Comment: a week    Drug use: Never   Other Topics Concern    Weight Concern Yes     Comment: cant loss weight    Caffeine Concern No    Exercise No     Social Determinants of Health      Received from [facility], [facility]    Social Connections    Received from [facility], [facility]    Housing Stability        REVIEW OF SYSTEMS:   GENERAL HEALTH: feels well otherwise  SKIN: denies any unusual skin lesions or rashes  RESPIRATORY: denies shortness of breath with exertion  CARDIOVASCULAR: denies chest pain on exertion  GI: denies abdominal pain  heartburn not responding to PEPCID 20 mg daily  NEURO: denies headaches  ENDO: elevated Glucose  PSYCH: having breakthrough anxiety issues, on the citalopram 20 mg daily, ADD not responding to  adderall XR 15 mg  EXAM:   BP (!) 142/92   Pulse 108   Temp 97.3 °F (36.3 °C) (Temporal)   Resp 16   Wt 268 lb 8 oz (121.8 kg)   SpO2 96%   BMI 40.23 kg/m²   GENERAL: well developed, well nourished,in no apparent distress  SKIN: no rashes,no suspicious lesions  HEENT: atraumatic, normocephalic,ears and throat are clear  NECK: supple,no adenopathy,no bruits  LUNGS: clear to auscultation  CARDIO: RRR without murmur  GI: good BS's,no masses, HSM or tenderness  EXTREMITIES: no cyanosis, clubbing or edema  PSYCH: villeda snot seem anxious or depressed but he feels like he is anxious at work and home , and his wife told him he needs something more.  ASSESSMENT AND PLAN:     Encounter Diagnoses   Name Primary?    Elevated glucose Yes    Attention deficit  hyperactivity disorder (ADHD), predominantly inattentive type     Gastroesophageal reflux disease with esophagitis without hemorrhage        Orders Placed This Encounter   Procedures    POC HEMOGLOBIN       Meds & Refills for this Visit:  Requested Prescriptions     Signed Prescriptions Disp Refills    citalopram 40 MG Oral Tab 30 tablet 1     Sig: Take 1 tablet (40 mg total) by mouth daily.     Patient not taking: Reported on 7/29/2024     His A1C WAS 6.0, CONSIDER DIABETIC EDUCATION AND WEIGHT LOSS, DOUBLE THE FAMOTIDINE TO 20 MG BID, DOUBLE THE CITALOPRAM TO 40 MG DAILY AND DOUBLE THE ADDERALL XR TO 30 MG DAILY. CALL IN 14 DAYS WITH UPDATE.    The patient indicates understanding of these issues and agrees to the plan.  The patient is asked to return in with update in 2 weeks.

## 2024-07-31 LAB
CARTRIDGE LOT#: 683 NUMERIC
HEMOGLOBIN A1C: 6 % (ref 4.3–5.6)

## 2024-08-12 DIAGNOSIS — F90.0 ATTENTION DEFICIT HYPERACTIVITY DISORDER (ADHD), PREDOMINANTLY INATTENTIVE TYPE: ICD-10-CM

## 2024-08-12 RX ORDER — FAMOTIDINE 20 MG/1
20 TABLET, FILM COATED ORAL 2 TIMES DAILY
Qty: 60 TABLET | Refills: 3 | Status: SHIPPED | OUTPATIENT
Start: 2024-08-12

## 2024-08-12 RX ORDER — DEXTROAMPHETAMINE SACCHARATE, AMPHETAMINE ASPARTATE MONOHYDRATE, DEXTROAMPHETAMINE SULFATE AND AMPHETAMINE SULFATE 7.5; 7.5; 7.5; 7.5 MG/1; MG/1; MG/1; MG/1
30 CAPSULE, EXTENDED RELEASE ORAL EVERY MORNING
Qty: 30 CAPSULE | Refills: 0 | Status: SHIPPED | OUTPATIENT
Start: 2024-08-12

## 2024-08-12 NOTE — TELEPHONE ENCOUNTER
Routing to provider per protocol.   famotidine 20 MG Oral Tab   Last refilled on ? for #?  with ? rf.     amphetamine-dextroamphetamine ER (ADDERALL XR) 30 MG Oral Capsule SR 24 Hr   Last refilled on 7/29/24 for #?  with ? rf.     Last labs 3/14/24.   Last seen on 7/29/24.     No future appointments.       Thank you.

## 2024-09-16 DIAGNOSIS — F90.0 ATTENTION DEFICIT HYPERACTIVITY DISORDER (ADHD), PREDOMINANTLY INATTENTIVE TYPE: ICD-10-CM

## 2024-09-16 NOTE — TELEPHONE ENCOUNTER
Patient calling to request refill of amphetamine-dextroamphetamine ER (ADDERALL XR) 30 MG Oral Capsule SR 24 Hr   Pharmacy [id number] - [place], [place] - [address] AT [facility] [address], [phone], [phone] [[id number]]

## 2024-09-16 NOTE — TELEPHONE ENCOUNTER
Routing to provider per protocol.   amphetamine-dextroamphetamine ER (ADDERALL XR) 30 MG Oral Capsule SR 24 Hr   Last refilled on 8/12/24 for #30  with 0 rf.   Last labs 7/29/24.   Last seen on 7/29/24.     No future appointments.       Thank you.

## 2024-09-17 RX ORDER — DEXTROAMPHETAMINE SACCHARATE, AMPHETAMINE ASPARTATE MONOHYDRATE, DEXTROAMPHETAMINE SULFATE AND AMPHETAMINE SULFATE 7.5; 7.5; 7.5; 7.5 MG/1; MG/1; MG/1; MG/1
30 CAPSULE, EXTENDED RELEASE ORAL EVERY MORNING
Qty: 30 CAPSULE | Refills: 0 | Status: SHIPPED | OUTPATIENT
Start: 2024-09-17

## 2024-09-24 RX ORDER — CITALOPRAM HYDROBROMIDE 40 MG/1
40 TABLET ORAL DAILY
Qty: 90 TABLET | Refills: 3 | Status: SHIPPED | OUTPATIENT
Start: 2024-09-24

## 2024-09-24 NOTE — TELEPHONE ENCOUNTER
Routing to provider per protocol.   citalopram 40 MG Oral Tab   Last refilled on 7/29/24 for #30  with 1 rf.   Last labs 3/14/24.   Last seen on 7/29/24.     No future appointments.       Thank you.

## 2024-10-23 DIAGNOSIS — F90.0 ATTENTION DEFICIT HYPERACTIVITY DISORDER (ADHD), PREDOMINANTLY INATTENTIVE TYPE: ICD-10-CM

## 2024-10-23 RX ORDER — DEXTROAMPHETAMINE SACCHARATE, AMPHETAMINE ASPARTATE MONOHYDRATE, DEXTROAMPHETAMINE SULFATE AND AMPHETAMINE SULFATE 7.5; 7.5; 7.5; 7.5 MG/1; MG/1; MG/1; MG/1
30 CAPSULE, EXTENDED RELEASE ORAL EVERY MORNING
Qty: 30 CAPSULE | Refills: 0 | Status: SHIPPED | OUTPATIENT
Start: 2024-10-23

## 2024-10-23 NOTE — TELEPHONE ENCOUNTER
Patient requests refill    amphetamine-dextroamphetamine ER (ADDERALL XR) 30 MG Oral Capsule SR 24 Hr     [facility]

## 2024-12-04 DIAGNOSIS — F90.0 ATTENTION DEFICIT HYPERACTIVITY DISORDER (ADHD), PREDOMINANTLY INATTENTIVE TYPE: ICD-10-CM

## 2024-12-04 RX ORDER — DEXTROAMPHETAMINE SACCHARATE, AMPHETAMINE ASPARTATE MONOHYDRATE, DEXTROAMPHETAMINE SULFATE AND AMPHETAMINE SULFATE 7.5; 7.5; 7.5; 7.5 MG/1; MG/1; MG/1; MG/1
30 CAPSULE, EXTENDED RELEASE ORAL EVERY MORNING
Qty: 30 CAPSULE | Refills: 0 | Status: SHIPPED | OUTPATIENT
Start: 2024-12-04 | End: 2024-12-09

## 2024-12-04 NOTE — TELEPHONE ENCOUNTER
Last OV:07/29/2024  Last refill:10/23/2024 30 caps, 0 refill     Medication pended, please sign if appropriate

## 2024-12-04 NOTE — TELEPHONE ENCOUNTER
Patient requests refill    amphetamine-dextroamphetamine ER (ADDERALL XR) 30 MG Oral Capsule SR 24 Hr     [facility]

## 2024-12-09 DIAGNOSIS — F90.0 ATTENTION DEFICIT HYPERACTIVITY DISORDER (ADHD), PREDOMINANTLY INATTENTIVE TYPE: ICD-10-CM

## 2024-12-09 RX ORDER — DEXTROAMPHETAMINE SACCHARATE, AMPHETAMINE ASPARTATE MONOHYDRATE, DEXTROAMPHETAMINE SULFATE AND AMPHETAMINE SULFATE 7.5; 7.5; 7.5; 7.5 MG/1; MG/1; MG/1; MG/1
30 CAPSULE, EXTENDED RELEASE ORAL EVERY MORNING
Qty: 30 CAPSULE | Refills: 0 | Status: SHIPPED | OUTPATIENT
Start: 2024-12-09

## 2024-12-09 NOTE — TELEPHONE ENCOUNTER
Patient calling to request refill of amphetamine-dextroamphetamine ER (ADDERALL XR) 30 MG Oral Capsule SR 24 Hr   Pharmacy [id number] - [place], [place] - [address] [place] [address] [phone], [phone] [[id number]]

## 2024-12-16 NOTE — TELEPHONE ENCOUNTER
Routing to provider per protocol. Amphetamine-Dextroamphet ER 15 MG Oral Capsule SR 24 Hr  Last refilled on 4/4/23 for #30  with 0 rf. Last labs 5/4/22. Last seen on 11/22/21. No future appointments. Thank you. Never smoker

## 2025-01-07 ENCOUNTER — OFFICE VISIT (OUTPATIENT)
Dept: FAMILY MEDICINE CLINIC | Facility: CLINIC | Age: 55
End: 2025-01-07
Payer: COMMERCIAL

## 2025-01-07 VITALS
WEIGHT: 271.5 LBS | DIASTOLIC BLOOD PRESSURE: 82 MMHG | SYSTOLIC BLOOD PRESSURE: 126 MMHG | RESPIRATION RATE: 16 BRPM | OXYGEN SATURATION: 99 % | HEART RATE: 110 BPM | TEMPERATURE: 97 F | BODY MASS INDEX: 41 KG/M2

## 2025-01-07 DIAGNOSIS — M54.2 NECK PAIN ON LEFT SIDE: ICD-10-CM

## 2025-01-07 DIAGNOSIS — R20.2 PARESTHESIA OF LEFT ARM: ICD-10-CM

## 2025-01-07 DIAGNOSIS — M54.12 LEFT CERVICAL RADICULOPATHY: Primary | ICD-10-CM

## 2025-01-07 PROCEDURE — 99214 OFFICE O/P EST MOD 30 MIN: CPT | Performed by: FAMILY MEDICINE

## 2025-01-07 RX ORDER — PREDNISONE 10 MG/1
TABLET ORAL
Qty: 18 TABLET | Refills: 0 | Status: SHIPPED | OUTPATIENT
Start: 2025-01-07

## 2025-01-07 NOTE — PROGRESS NOTES
Carmelo Covington is a 54 year old male.  HPI:   Carmelo has had left shoulder pain for 3 weeks now, has been doing a lot of lifting at work, has pain behind the left shoulder to the back of the tricep, anteriorly has pain radiation to the bicpe and feels like it is electrical. Involves the left hand and fingers. Tried naproxen  advil and alleve. Pain keeping him awake at night.   Current Outpatient Medications   Medication Sig Dispense Refill    predniSONE 10 MG Oral Tab 3 pills for 3 days, 2 pills for 3 days, then one pill daily for 3 days 18 tablet 0    amphetamine-dextroamphetamine ER (ADDERALL XR) 30 MG Oral Capsule SR 24 Hr Take 1 capsule (30 mg total) by mouth every morning. 30 capsule 0    citalopram 40 MG Oral Tab Take 1 tablet (40 mg total) by mouth daily. 90 tablet 3    famotidine 20 MG Oral Tab Take 1 tablet (20 mg total) by mouth 2 (two) times daily. 60 tablet 3    Cholecalciferol 50 MCG (2000 UT) Oral Tab Take by mouth.      oxybutynin ER 5 MG Oral Tablet 24 Hr Take 1 tablet (5 mg total) by mouth daily.      levothyroxine 125 MCG Oral Tab Take 1 tablet (125 mcg total) by mouth before breakfast. 90 tablet 3    tamsulosin 0.4 MG Oral Cap Take 1 capsule (0.4 mg total) by mouth daily.      naproxen 500 MG Oral Tab Take 1 tablet (500 mg total) by mouth 2 (two) times daily with meals. 60 tablet 3    HYDROcodone-acetaminophen 5-325 MG Oral Tab Take 1-2 tablets by mouth every 4 (four) hours as needed for Pain. (Patient not taking: Reported on 1/7/2025) 12 tablet 0    Fenofibrate 54 MG Oral Tab Take 1 tablet (54 mg total) by mouth daily. (Patient not taking: Reported on 1/11/2024) 90 tablet 2      Past Medical History:    Allergic rhinitis    Anxiety    Carpal tunnel syndrome on both sides    Esophageal reflux    Hypothyroid    Hypothyroidism    Obesity      Social History:  Social History     Socioeconomic History    Marital status:    Tobacco Use    Smoking status: Former     Current packs/day: 0.00      Average packs/day: 1 pack/day for 3.0 years (3.0 ttl pk-yrs)     Types: Cigarettes     Start date: 11/25/2018     Quit date: 11/25/2021     Years since quitting: 3.1    Smokeless tobacco: Never   Vaping Use    Vaping status: Some Days    Substances: Nicotine    Devices: Disposable   Substance and Sexual Activity    Alcohol use: Yes     Alcohol/week: 6.0 standard drinks of alcohol     Types: 6 Standard drinks or equivalent per week     Comment: a week    Drug use: Never   Other Topics Concern    Weight Concern Yes     Comment: cant loss weight    Caffeine Concern No    Exercise No     Social Drivers of Health      Received from [facility], [facility]    Social Connections    Received from [facility], [facility]    Housing Stability        REVIEW OF SYSTEMS:   GENERAL HEALTH: feels well otherwise  SKIN: denies any unusual skin lesions or rashes  RESPIRATORY: denies shortness of breath with exertion  CARDIOVASCULAR: denies chest pain on exertion  GI: denies abdominal pain and denies heartburn  NEURO: denies headaches    EXAM:   /82   Pulse 110   Temp 97.3 °F (36.3 °C) (Temporal)   Resp 16   Wt 271 lb 8 oz (123.2 kg)   SpO2 99%   BMI 40.68 kg/m²   GENERAL: well developed, well nourished,in no apparent distress  SKIN: no rashes,no suspicious lesions  HEENT: atraumatic, normocephalic,ears and throat are clear  NECK: supple,no adenopathy,no bruits  LUNGS: clear to auscultation  CARDIO: RRR without murmur  GI: good BS's,no masses, HSM or tenderness  EXTREMITIES: no cyanosis, clubbing or edema    ASSESSMENT AND PLAN:    C2-C3:  No significant disc/facet abnormality, spinal stenosis, or foraminal narrowing.   C3-C4:  Left paracentral uncal osteophyte without spinal canal stenosis.  Mild left neural foraminal stenosis.   C4-C5:  No significant disc/facet abnormality, spinal stenosis, or foraminal narrowing.   C5-C6:  Left paracentral  posterior disc osteophyte complexes causing moderate to severe left neural foraminal stenosis without spinal canal stenosis.   C6-C7:  Posterior disc osteophyte complex with moderate right neural foraminal stenosis.  Mild spinal canal stenosis.   C7-T1:  No significant disc/facet abnormality, spinal stenosis, or foraminal narrowing.   The patient indicates understanding of these issues and agrees to the plan  Encounter Diagnoses   Name Primary?    Left cervical radiculopathy Yes    Neck pain on left side     Paresthesia of left arm      C5-6 is most likely source of his pain    Meds & Refills for this Visit:  Requested Prescriptions     Signed Prescriptions Disp Refills    predniSONE 10 MG Oral Tab 18 tablet 0     Sig: 3 pills for 3 days, 2 pills for 3 days, then one pill daily for 3 days       Imaging & Consults:  OP REFERRAL PAIN MANGEMENT    The patient is asked to return in after he sees Pain management for possible injection.

## 2025-01-15 ENCOUNTER — OFFICE VISIT (OUTPATIENT)
Dept: PAIN CLINIC | Facility: CLINIC | Age: 55
End: 2025-01-15
Payer: COMMERCIAL

## 2025-01-15 VITALS — OXYGEN SATURATION: 97 % | DIASTOLIC BLOOD PRESSURE: 82 MMHG | SYSTOLIC BLOOD PRESSURE: 126 MMHG | HEART RATE: 94 BPM

## 2025-01-15 DIAGNOSIS — M54.12 RADICULITIS OF LEFT CERVICAL REGION: ICD-10-CM

## 2025-01-15 DIAGNOSIS — M48.02 FORAMINAL STENOSIS OF CERVICAL REGION: ICD-10-CM

## 2025-01-15 DIAGNOSIS — M50.20 HNP (HERNIATED NUCLEUS PULPOSUS), CERVICAL: Primary | ICD-10-CM

## 2025-01-15 PROCEDURE — 99214 OFFICE O/P EST MOD 30 MIN: CPT | Performed by: PHYSICIAN ASSISTANT

## 2025-01-15 NOTE — PROGRESS NOTES
HPI:   Carmelo Covington presents with complaints of left sided neck pain with more significant L scapular and trapezius pain to upper arm and radial aspect of forearm to all digits of hand.    The pain is described as moderate tingling, buzzing, aching that is constant .  The patient’s activity level has remained the same since last visit.  The pain is worst unrelated to time of day.    Changes in condition/history since last visit: Patient is here today having been seen last on 5/8/2024 for follow-up after BERNARDO #1 on 4/25/2024.  Had reported 80% improvement in pain and was very pleased with his response.  With time, states that pain has returned, though is exclusively L sided.  He followed with PCP, and was given course of PO steroid, to some amount of relief, inthat the \"bruising feeling\" has rsolved, though certainly continues with pain.  Sent for repeat procedure.      Last procedure: BERNARDO #1    date: 4/25/2024    Percentage of relief experienced from the procedure: 80%    Duration of the relief: 8 months    The following activities will increase the patient’s pain: ROM C spine    The following activities decrease the patient’s pain: limiting activity level    Functional Assessment: Patient reports that they are able to complete all of their ADL's such as eating, bathing, using the toilet, dressing and getting up from a bed or a chair independently.    Current Medications:  Current Outpatient Medications   Medication Sig Dispense Refill    predniSONE 10 MG Oral Tab 3 pills for 3 days, 2 pills for 3 days, then one pill daily for 3 days 18 tablet 0    amphetamine-dextroamphetamine ER (ADDERALL XR) 30 MG Oral Capsule SR 24 Hr Take 1 capsule (30 mg total) by mouth every morning. 30 capsule 0    citalopram 40 MG Oral Tab Take 1 tablet (40 mg total) by mouth daily. 90 tablet 3    famotidine 20 MG Oral Tab Take 1 tablet (20 mg total) by mouth 2 (two) times daily. 60 tablet 3    Cholecalciferol 50 MCG (2000 UT) Oral Tab  Take by mouth.      oxybutynin ER 5 MG Oral Tablet 24 Hr Take 1 tablet (5 mg total) by mouth daily.      levothyroxine 125 MCG Oral Tab Take 1 tablet (125 mcg total) by mouth before breakfast. 90 tablet 3    HYDROcodone-acetaminophen 5-325 MG Oral Tab Take 1-2 tablets by mouth every 4 (four) hours as needed for Pain. (Patient not taking: Reported on 1/7/2025) 12 tablet 0    tamsulosin 0.4 MG Oral Cap Take 1 capsule (0.4 mg total) by mouth daily.      naproxen 500 MG Oral Tab Take 1 tablet (500 mg total) by mouth 2 (two) times daily with meals. 60 tablet 3    Fenofibrate 54 MG Oral Tab Take 1 tablet (54 mg total) by mouth daily. (Patient not taking: Reported on 1/11/2024) 90 tablet 2      Patient requires assistance with: No assistance required    Reviewed Patient History Dated: 5/8/24 no changes noted    Physical Exam:   There were no vitals taken for this visit.  VAS Pain Score:  6/10  General Appearance: Well developed, well nourished, normal build, independent body habitus, no apparent physical disabilities, well groomed    Neurological Exam: WNL-Orientation to time, place and person, normal mood & effect, normal concentration & attention span  Inspection: non-antalgic, no acute distress  Pain with ROM C spine:  extension and L lateral bending  No focal sensory/motor deficits  Positive left Spurling  Radiology/Lab Test Reviewed: MRI C spine 1/19/24:  CERVICAL DISC LEVELS:   C2-C3:  No significant disc/facet abnormality, spinal stenosis, or foraminal narrowing.   C3-C4:  Left paracentral uncal osteophyte without spinal canal stenosis.  Mild left neural foraminal stenosis.   C4-C5:  No significant disc/facet abnormality, spinal stenosis, or foraminal narrowing.   C5-C6:  Left paracentral posterior disc osteophyte complexes causing moderate to severe left neural foraminal stenosis without spinal canal stenosis.   C6-C7:  Posterior disc osteophyte complex with moderate right neural foraminal stenosis.  Mild spinal  canal stenosis.   C7-T1:  No significant disc/facet abnormality, spinal stenosis, or foraminal narrowing.   Lab Results   Component Value Date    WBC 9.1 03/14/2024    WBC 9.1 12/19/2023    WBC 6.9 05/04/2022     Lab Results   Component Value Date    HEMOGLOBIN 6.0 (A) 07/29/2024     Lab Results   Component Value Date    .0 03/14/2024     12/19/2023    .0 05/04/2022     Do you have any known blood/bleeding disorders?  No  Does patient currently take blood thinners?   None  Does patient currently take any antibiotics?   No  Patient educated and verbalized understanding.  Medical Decision Making:   Diagnosis:    Encounter Diagnoses   Name Primary?    HNP (herniated nucleus pulposus), cervical Yes    Foraminal stenosis of cervical region     Radiculitis of left cervical region      Impression: IntraStent relief with neck and radiating bilateral trapezius pain with BERNARDO on 4/25/2024.  Over the last month pain has returned that was now left-sided and does proceed to the more distal distribution with radicular pain from the upper arm to the radial aspect of the forearm and all digits of the hand.  Does have MRI proven left C5-6 HNP with severe foraminal stenosis, in keeping with his complaints.  Given success of previous injection, will repeat BERNARDO at his earliest convenience, risks and benefits of which were reviewed in detail.    Plan: Patient to schedule the following injection: BERNARDO Levels: C7-T1, Procedure and risks were discussed with pt. including headache, bleeding, infection and potential nerve damage.  Follow-up 2 to 3 weeks.    No orders of the defined types were placed in this encounter.      Meds & Refills for this Visit:  Requested Prescriptions      No prescriptions requested or ordered in this encounter       Imaging & Consults:  None    The patient indicates understanding of these issues and agrees to the plan.    KOMAL Sue

## 2025-01-15 NOTE — PROGRESS NOTES
Patient presents in office today with reported pain in left shoulder arm and neck    Current pain level reported = 6/10    Last reported dose of NA      Narcotic Contract renewal NA    Urine Drug screen NA

## 2025-01-17 ENCOUNTER — TELEPHONE (OUTPATIENT)
Dept: PAIN CLINIC | Facility: CLINIC | Age: 55
End: 2025-01-17

## 2025-01-17 DIAGNOSIS — M54.12 CERVICAL RADICULITIS: Primary | ICD-10-CM

## 2025-01-17 NOTE — TELEPHONE ENCOUNTER
Prior Authorization for BERNARDO   initiated with [facility]  CPT codes: 24629  pending reference # [id number].   Clinical notes submitted via uploaded to chart

## 2025-01-20 NOTE — TELEPHONE ENCOUNTER
Patient advised of insurance approval to proceed with injections and is agreeable to scheduling. Patient scheduled for procedure, pre-procedure instructions reviewed. Patient prefers Local sedation. Reviewed sedation instructions including No Fasting & No  Required. Patient encouraged but not required to hold Naproxen/NSAIDs for 24 hours prior to procedure. Patient verbalized understanding of instructions, no further needs at this time.      [facility]  PRE-PROCEDURE INSTRUCTIONS WITHOUT SEDATION    Procedure: BERNARDO       Appointment Date: 01/21/2025  Check-In Time: 08:45 AM    Follow-Up Date/Time: 02/05/2025 @ 03:30 PM    Prior to the procedure:  Please update us prior to the procedure if you are experiencing any symptoms of infection such as cough, fever, chills, urinary symptoms, or have recently been prescribed antibiotics, have open wounds, have recently had surgery or dental procedures.    Day of Procedure:  **Drivers will be required for patients who receive prescriptions for Valium.    NO FASTING REQUIRED  Please bring your Insurance Card, Photo ID, List of Current Medications and Referral (if applicable) to your appointment.  Please park in the [facility] and follow the signs to the \[facility] [place] [facility]\"".  Check in at [facility] ([address] [place] [address]) outpatient registration in the \[facility] [place] [facility]\"".  Please note-No prescriptions will be written by Pain Clinic in [place] on the day of procedure. If you require a refill of medications, please contact the office 48 hours prior to your procedure.  If you have an implanted Spinal Cord or Peripheral Nerve Stimulator: Please remember to turn device off for procedure.        Medication Hold:    Number of days you need to be off for the following medications:    Aggrenox 10 days   Agrylin (Anagrelide) 10 days  Brilinta (Ticagrelor) 7 days  Imbruvica (Ibrutinib) 3 days   Enbrel (Etanercept) 24 hours   Fragmin (Dalteparin) 24  hours   Pletal (Cilostazol) 7 days  Effient (Prasugrel) 7 days  Pradaxa 10 days  Trental 7 days  Eliquis (Apixaban) 3 days  Xarelto (Rivaroxaban) 3 days  Lovenox (Enoxaparin) 24 hours  Aspirin  Greater than 81mg but less than 325mg   5 days  325mg and greater                  7 days  Coumadin       5 days  Procedure may be cancelled if INR is elevated.   Excedrin (with aspirin) 7 days  Plavix (Clopidogrel)                            7 days    NSAIDs: 24 hours preferred      Ibuprofen (Motrin, Advil, Vicoprofen), Naproxen (Naprosyn, Aleve), Piroxcam (Feldene), Meloxicam (Mobic), Oxaprozin (Daypro), Diclofenac (Voltaren), Indomethacin (Indocin), Etodolac (Lodine), Nabumetone (Relafen), Celebrex (Celecoxib)           HERBAL SUPPLEMENTS  5 days preferred  Fish oil, krill oil, Omega-3, Vascepa, Vitamin E, Turmeric, Garlic                       Insurance Authorization:   Most insurances are now requiring a preauthorization for all procedures.  In the event that your insurance does not authorize your procedure within 48 hours of the scheduled date, your procedure will be cancelled and rescheduled to a later date.  Please contact your insurance carrier to determine what your financial responsibility will be for the procedure(s).      Cancellation/Rescheduling Appointment:   In the event you need to cancel or reschedule your appointment, you must notify the office 24 hours prior.    Post-procedure instructions:        Please schedule a follow up visit within 2 to 4 weeks after your last procedure date   Please call our office with any questions or concerns before or after your procedure at  [phone].  If you are a diabetic, please increase the frequency of your glucose monitoring after the procedure as this may cause a temporary increase in your blood sugar.  Contact your primary care physician if your blood sugar rises as you may require some medication adjustment.  It is normal to have increased pain at injection site  for up to 3-5 days after procedure, you can use heat or ice (20 minutes on 20 minutes off) for comfort.    **To hear a recorded version of these instructions, please call [phone] and follow the prompts.  **Para escuchar las instrucciones en Español, por favor de llamar el elayne [phone] opción 4.

## 2025-01-20 NOTE — TELEPHONE ENCOUNTER
Prior authorization request completed for: BERNARDO  Authorization  [id number]   Authorization dates: 1/17/25-4/17/25  CPT codes approved: 49350  Number of visits/dates of service approved: 1  Physician: danielle  Location: [facility]     Patient can be scheduled. Routed to Navigator.

## 2025-01-21 ENCOUNTER — APPOINTMENT (OUTPATIENT)
Dept: GENERAL RADIOLOGY | Facility: HOSPITAL | Age: 55
End: 2025-01-21
Attending: ANESTHESIOLOGY
Payer: COMMERCIAL

## 2025-01-21 ENCOUNTER — HOSPITAL ENCOUNTER (OUTPATIENT)
Facility: HOSPITAL | Age: 55
Setting detail: HOSPITAL OUTPATIENT SURGERY
Discharge: HOME | End: 2025-01-21
Attending: ANESTHESIOLOGY | Admitting: ANESTHESIOLOGY
Payer: COMMERCIAL

## 2025-01-21 VITALS
OXYGEN SATURATION: 99 % | TEMPERATURE: 98 F | SYSTOLIC BLOOD PRESSURE: 118 MMHG | DIASTOLIC BLOOD PRESSURE: 77 MMHG | RESPIRATION RATE: 18 BRPM | HEART RATE: 66 BPM

## 2025-01-21 PROBLEM — M54.12 CERVICAL RADICULITIS: Status: ACTIVE | Noted: 2025-01-21

## 2025-01-21 PROCEDURE — 3E0R33Z INTRODUCTION OF ANTI-INFLAMMATORY INTO SPINAL CANAL, PERCUTANEOUS APPROACH: ICD-10-PCS | Performed by: ANESTHESIOLOGY

## 2025-01-21 PROCEDURE — 62321 NJX INTERLAMINAR CRV/THRC: CPT | Performed by: ANESTHESIOLOGY

## 2025-01-21 RX ORDER — SODIUM CHLORIDE, SODIUM LACTATE, POTASSIUM CHLORIDE, CALCIUM CHLORIDE 600; 310; 30; 20 MG/100ML; MG/100ML; MG/100ML; MG/100ML
100 INJECTION, SOLUTION INTRAVENOUS CONTINUOUS
Status: DISCONTINUED | OUTPATIENT
Start: 2025-01-21 | End: 2025-01-21

## 2025-01-21 RX ORDER — NALOXONE HYDROCHLORIDE 0.4 MG/ML
0.08 INJECTION, SOLUTION INTRAMUSCULAR; INTRAVENOUS; SUBCUTANEOUS AS NEEDED
Status: DISCONTINUED | OUTPATIENT
Start: 2025-01-21 | End: 2025-01-21

## 2025-01-21 RX ORDER — DEXAMETHASONE SODIUM PHOSPHATE 10 MG/ML
INJECTION, SOLUTION INTRAMUSCULAR; INTRAVENOUS
Status: DISCONTINUED | OUTPATIENT
Start: 2025-01-21 | End: 2025-01-21

## 2025-01-21 RX ORDER — LIDOCAINE HYDROCHLORIDE 10 MG/ML
INJECTION, SOLUTION EPIDURAL; INFILTRATION; INTRACAUDAL; PERINEURAL
Status: DISCONTINUED | OUTPATIENT
Start: 2025-01-21 | End: 2025-01-21

## 2025-01-21 RX ORDER — SODIUM CHLORIDE 9 MG/ML
INJECTION, SOLUTION INTRAMUSCULAR; INTRAVENOUS; SUBCUTANEOUS
Status: DISCONTINUED | OUTPATIENT
Start: 2025-01-21 | End: 2025-01-21

## 2025-01-21 NOTE — H&P
History & Physical Examination    Patient Name: Carmelo Covington  MRN: [id number]  CSN: [id number]  YOB: 1970    Pre-Operative Diagnosis:  Cervical radiculitis [M54.12]    Present Illness: Cervical radiculitis    ASA: 2  MP class: 1  Sedation: Local      Prescriptions Prior to Admission[1]  No current facility-administered medications for this encounter.       Allergies: Allergies[2]    Past Medical History:    Allergic rhinitis    Anxiety    Carpal tunnel syndrome on both sides    Esophageal reflux    Hypothyroid    Hypothyroidism    Obesity     Past Surgical History:   Procedure Laterality Date    Other surgical history  01/01/2009    Carpal tunnel right hand    Tonsillectomy       Family History   Problem Relation Age of Onset    Cancer Father     Cancer Maternal Grandmother     Cancer Maternal Grandfather     Heart Disease Neg     Stroke Neg      Social History     Tobacco Use    Smoking status: Former     Current packs/day: 0.00     Average packs/day: 1 pack/day for 3.0 years (3.0 ttl pk-yrs)     Types: Cigarettes     Start date: 11/25/2018     Quit date: 11/25/2021     Years since quitting: 3.1    Smokeless tobacco: Never   Substance Use Topics    Alcohol use: Yes     Alcohol/week: 6.0 standard drinks of alcohol     Types: 6 Standard drinks or equivalent per week     Comment: a week       SYSTEM Check if Review is Normal Check if Physical Exam is Normal If not normal, please explain:   HEENT [x ] [x ]    NECK & BACK [x ] [x ]    HEART [x ] [x ]    LUNGS [x ] [x ]    ABDOMEN [x ] [x ]    UROGENITAL [x ] [x ]    EXTREMITIES [x ] [x ]    OTHER        [ x ] I have discussed the risks and benefits and alternatives with the patient/family.  They understand and agree to proceed with plan of care.  [ x ] I have reviewed the History and Physical done within the last 30 days.  Any changes noted above.    Tres Mathis MD              [1]   Medications Prior to Admission   Medication Sig Dispense Refill Last  Dose/Taking    predniSONE 10 MG Oral Tab 3 pills for 3 days, 2 pills for 3 days, then one pill daily for 3 days 18 tablet 0     amphetamine-dextroamphetamine ER (ADDERALL XR) 30 MG Oral Capsule SR 24 Hr Take 1 capsule (30 mg total) by mouth every morning. 30 capsule 0     citalopram 40 MG Oral Tab Take 1 tablet (40 mg total) by mouth daily. 90 tablet 3     famotidine 20 MG Oral Tab Take 1 tablet (20 mg total) by mouth 2 (two) times daily. 60 tablet 3     Cholecalciferol 50 MCG (2000 UT) Oral Tab Take by mouth. (Patient not taking: Reported on 1/15/2025)       oxybutynin ER 5 MG Oral Tablet 24 Hr Take 1 tablet (5 mg total) by mouth daily.       levothyroxine 125 MCG Oral Tab Take 1 tablet (125 mcg total) by mouth before breakfast. 90 tablet 3     tamsulosin 0.4 MG Oral Cap Take 1 capsule (0.4 mg total) by mouth daily.       naproxen 500 MG Oral Tab Take 1 tablet (500 mg total) by mouth 2 (two) times daily with meals. 60 tablet 3     Fenofibrate 54 MG Oral Tab Take 1 tablet (54 mg total) by mouth daily. 90 tablet 2    [2] No Known Allergies

## 2025-01-21 NOTE — OPERATIVE REPORT
[facility]  Operative Report  2025     Carmelo Covington Patient Status:  Hospital Outpatient Surgery    1970 MRN [id number]   Location [facility] Attending Tres Mathis MD   Hosp Day # 0 PCP Rui Garcia DO     Indication: Carmelo is a 54 year old male with cervical radiculitis    Preoperative Diagnosis:  Cervical radiculitis [M54.12]    Postoperative Diagnosis: Same as above.    Procedure performed: CERVICAL EPIDURAL STEROID INJECTION with local    Anesthesia: Local .    EBL: Less than 1 ml.    Procedure Description:  After reviewing the patient's history and performing a focused physical examination, the diagnosis was confirmed and contraindications such as infection and coagulopathy were ruled out.  Following review of allergies, potential side effects, and complications, including but not necessarily limited to infection, allergic reaction, local tissue breakdown, nerve injury, post-dural puncture headache and paresis, the patient indicated they understood and agreed to proceed.  After obtaining the informed consent, the patient was brought to the procedure room and monitored.      The patient was brought to the procedure room and positioned prone.  After comprehensive monitors were applied, the patient's neck was prepped and draped sterilely.  After local anesthetic was instilled in the skin and subcutaneous tissue, a 20-gauge Tuohy needle was introduced and advanced under fluoroscopy at C7-T1.  The epidural space was reached by using a loss of resistance to air technique. There was no C.S.F. or blood through the needle. After obtaining a good epidurogram by injecting Omnipaque 180 1 mL, a combination of normal saline and dexamethasone 10 mg in total volume of 4 mL was injected.  The needle was then flushed with normal saline 1 mL.  The stylet re-applied.  The needle was withdrawn with the tip intact.  The patient tolerated the procedure very well and recovered and was  discharged to a responsible adult after discharge criteria were met.        Complications: None.    Follow up:  The patient was followed in the pain clinic as needed basis.          Tres Mathis MD

## 2025-01-21 NOTE — DISCHARGE INSTRUCTIONS
Home Care Instructions Following Your Pain Procedure     Carmelo,  It has been a pleasure to have you as our patient. To help you at home, you must follow these general discharge instructions. We will review these with you before you are discharged. It is our hope that you have a complete and uneventful recovery from our procedure.     General Instructions:  What to Expect:  Bandages from your procedure today can be removed when you get home.  Please avoid soaking and/or swimming for 24 hours.  Showering is okay  It is normal to have increased pain symptoms and/or pain at injection site for up to 3-5 days after procedure, you can use heat or ice (20 minutes on 20 minutes off) for comfort.  You may experience some temporary side effects which may include restlessness or insomnia, flushing of the face, or heart palpitations.  Please contact the provider if these symptoms do not resolve within 3-4 days.  Lightheadedness or nausea may occur and should resolve within 24 to 48 hours.  If you develop a headache after treatment, rest, drink fluids (with caffeine, if possible) and take mild over-the-counter pain medication.  If the headache does not improve with the above treatment, contact the physician.  Home Medications:  Resume all previously prescribed medication.  Please avoid taking NSAIDs (Non-Steriodal Anti-Inflammatory Drugs) such as:  Ibuprofen ( Advil, Motrin) Aleve (Naproxen), Diclofenac, Meloxicam for 6 hours after procedure.   If you are on Coumadin (Warfarin) or any other anti-coagulant (or \"blood thinning\") medication such as Plavix (Clopidogrel), Xarelto (Rivaroxaban), Eliquis (Apixaban), Effient (Prasugrel) etc., restart on the following day from the procedure unless otherwise directed by your provider.  If you are a diabetic, please increase the frequency of your glucose monitoring after the procedure as steroids may cause a temporary (2-3 day) increase in your blood sugar.  Contact your primary care  physician if your blood sugar remains elevated as you may require some medication adjustment.  Diet:  Resume your regular diet as tolerated.  Activity:  We recommend that you relax and rest during the rest of your procedure day.  If you feel weakness in your arms or legs do not drive.  Follow-up Appointment  Please schedule a follow-up visit within 3 to 4 weeks after your last procedure date.  Question or Concerns:  Feel free to call our office with any questions or concerns at [phone] (option #2)    Carmelo  Thank you for coming to [facility] for your procedure.  The nurses try very hard to make sure you receive the best care possible.  Your trust in them as well as us is greatly appreciated.    Thanks so much,   Dr. Tres Mathis

## 2025-01-22 ENCOUNTER — TELEPHONE (OUTPATIENT)
Dept: PAIN CLINIC | Facility: CLINIC | Age: 55
End: 2025-01-22

## 2025-01-22 NOTE — TELEPHONE ENCOUNTER
Courtericka called placed to patient for post procedure follow up. Patient stated no questions nor concerns, still in pa. Informed patient that soreness is to be expected after the procedure. Educated patient that it takes 3-5 days for the steroid to be effective and to allow adequate time for medication to work. Encouraged patient to alternate ice and heat and to take medications as prescribed. Pt verbalized understanding to call with any questions or concerns.      Procedure: BERNARDO  Date: 1/21/225  Follow up Visit Scheduled: 2/5/2025 with Jeremy Mckenna

## 2025-02-05 ENCOUNTER — OFFICE VISIT (OUTPATIENT)
Dept: PAIN CLINIC | Facility: CLINIC | Age: 55
End: 2025-02-05
Payer: COMMERCIAL

## 2025-02-05 VITALS
BODY MASS INDEX: 41 KG/M2 | SYSTOLIC BLOOD PRESSURE: 124 MMHG | WEIGHT: 271 LBS | DIASTOLIC BLOOD PRESSURE: 76 MMHG | HEART RATE: 102 BPM | OXYGEN SATURATION: 98 %

## 2025-02-05 DIAGNOSIS — M50.20 HERNIATED CERVICAL DISC: Primary | ICD-10-CM

## 2025-02-05 DIAGNOSIS — M54.12 CERVICAL RADICULITIS: ICD-10-CM

## 2025-02-05 PROCEDURE — 99214 OFFICE O/P EST MOD 30 MIN: CPT | Performed by: PHYSICIAN ASSISTANT

## 2025-02-05 NOTE — PROGRESS NOTES
Last procedure: CERVICAL EPIDURAL STEROID INJECTION with local   Date: 01/21/25  Percentage of relief obtained: 20%  Duration of relief: current    Current Pain Score: 5

## 2025-02-05 NOTE — PROGRESS NOTES
HPI:   Carmelo Covington presents with complaints of left sided neck pain with more significant L scapular and trapezius pain to upper arm and radial aspect of forearm to all digits of hand.    The pain is described as moderate tingling, buzzing, aching that is constant .  The patient’s activity level has remained the same since last visit.  The pain is worst unrelated to time of day.    Changes in condition/history since last visit: Patient is here today for follow-up having had BERNARDO on 1/21/2025.  Procedure was well-tolerated and had no adverse effects.  Overall, reports mild relief, in that he can now lay down in a relatively comfortable position, but with any activity, pain is largely unchanged.      Last procedure: BERNARDO     date: 1/21/2025 (4/25/2024)    Percentage of relief experienced from the procedure: 20%    Duration of the relief: sustained     The following activities will increase the patient’s pain: ROM C spine    The following activities decrease the patient’s pain: limiting activity level    Functional Assessment: Patient reports that they are able to complete all of their ADL's such as eating, bathing, using the toilet, dressing and getting up from a bed or a chair independently.    Current Medications:  Current Outpatient Medications   Medication Sig Dispense Refill    predniSONE 10 MG Oral Tab 3 pills for 3 days, 2 pills for 3 days, then one pill daily for 3 days 18 tablet 0    amphetamine-dextroamphetamine ER (ADDERALL XR) 30 MG Oral Capsule SR 24 Hr Take 1 capsule (30 mg total) by mouth every morning. 30 capsule 0    citalopram 40 MG Oral Tab Take 1 tablet (40 mg total) by mouth daily. 90 tablet 3    famotidine 20 MG Oral Tab Take 1 tablet (20 mg total) by mouth 2 (two) times daily. 60 tablet 3    Cholecalciferol 50 MCG (2000 UT) Oral Tab Take by mouth.      oxybutynin ER 5 MG Oral Tablet 24 Hr Take 1 tablet (5 mg total) by mouth daily.      levothyroxine 125 MCG Oral Tab Take 1 tablet (125 mcg  total) by mouth before breakfast. 90 tablet 3    tamsulosin 0.4 MG Oral Cap Take 1 capsule (0.4 mg total) by mouth daily.      naproxen 500 MG Oral Tab Take 1 tablet (500 mg total) by mouth 2 (two) times daily with meals. 60 tablet 3    Fenofibrate 54 MG Oral Tab Take 1 tablet (54 mg total) by mouth daily. 90 tablet 2      Patient requires assistance with: No assistance required    Reviewed Patient History Dated: 1/21/2025 no changes noted    Physical Exam:   There were no vitals taken for this visit.  VAS Pain Score:  6/10  General Appearance: Well developed, well nourished, normal build, independent body habitus, no apparent physical disabilities, well groomed    Neurological Exam: WNL-Orientation to time, place and person, normal mood & effect, normal concentration & attention span  Inspection: non-antalgic, no acute distress  Pain with ROM C spine:  extension and L lateral bending  No focal sensory/motor deficits  Positive left Spurling  Radiology/Lab Test Reviewed: MRI C spine 1/19/24:  CERVICAL DISC LEVELS:   C2-C3:  No significant disc/facet abnormality, spinal stenosis, or foraminal narrowing.   C3-C4:  Left paracentral uncal osteophyte without spinal canal stenosis.  Mild left neural foraminal stenosis.   C4-C5:  No significant disc/facet abnormality, spinal stenosis, or foraminal narrowing.   C5-C6:  Left paracentral posterior disc osteophyte complexes causing moderate to severe left neural foraminal stenosis without spinal canal stenosis.   C6-C7:  Posterior disc osteophyte complex with moderate right neural foraminal stenosis.  Mild spinal canal stenosis.   C7-T1:  No significant disc/facet abnormality, spinal stenosis, or foraminal narrowing.   Lab Results   Component Value Date    WBC 9.1 03/14/2024    WBC 9.1 12/19/2023    WBC 6.9 05/04/2022     Lab Results   Component Value Date    HEMOGLOBIN 6.0 (A) 07/29/2024     Lab Results   Component Value Date    .0 03/14/2024     12/19/2023     .0 05/04/2022     Do you have any known blood/bleeding disorders?  No  Does patient currently take blood thinners?   None  Does patient currently take any antibiotics?   No  Patient educated and verbalized understanding.  Medical Decision Making:   Diagnosis:    Encounter Diagnoses   Name Primary?    Herniated cervical disc Yes    Cervical radiculitis      Impression: excellent relief with neck and radiating bilateral trapezius pain with BERNARDO on 4/25/2024.  Over the last month pain has returned and is now left-sided and does proceed more distally with radicular pain from the upper arm to the radial aspect of the forearm and all digits of the hand.  Does have MRI proven left C5-6 HNP with severe foraminal stenosis, in keeping with his complaints.  Given success of previous injection, did repeat BERNARDO to much less significant improvement (20%).  States that he is able to find a comfortable position while sleeping now, though with any sort of activity throughout the day, has reproduction of symptoms.  Will update MRI, and have him consult surgery to discuss more definitive options.    Plan: Patient to follow up PRN.  Update MRI, surgical referral.    No orders of the defined types were placed in this encounter.      Meds & Refills for this Visit:  Requested Prescriptions      No prescriptions requested or ordered in this encounter       Imaging & Consults:  None    The patient indicates understanding of these issues and agrees to the plan.    KOMAL Sue

## 2025-02-05 NOTE — PATIENT INSTRUCTIONS
Refill policies:    Allow 2-3 business days for refills; controlled substances may take longer.  Contact your pharmacy at least 5 days prior to running out of medication and have them send an electronic request or submit request through the “request refill” option in your [employer] account.  Refills are not addressed on weekends; covering physicians do not authorize routine medications on weekends.  No narcotics or controlled substances are refilled after noon on Fridays or by on call physicians.  By law, narcotics must be electronically prescribed.  A 30 day supply with no refills is the maximum allowed.  If your prescription is due for a refill, you may be due for a follow up appointment.  To best provide you care, patients receiving routine medications need to be seen at least once a year.  Patients receiving narcotic/controlled substance medications need to be seen at least once every 3 months.  In the event that your preferred pharmacy does not have the requested medication in stock (e.g. Backordered), it is your responsibility to find another pharmacy that has the requested medication available.  We will gladly send a new prescription to that pharmacy at your request.    Scheduling Tests:    If your physician has ordered radiology tests such as MRI or CT scans, please contact Central Scheduling at [phone] right away to schedule the test.  Once scheduled, the [facility] Centralized Referral Team will work with your insurance carrier to obtain pre-certification or prior authorization.  Depending on your insurance carrier, approval may take 3-10 days.  It is highly recommended patients assure they have received an authorization before having a test performed.  If test is done without insurance authorization, patient may be responsible for the entire amount billed.      Precertification and Prior Authorizations:  If your physician has recommended that you have a procedure or additional testing performed the [facility]  Centralized Referral Team will contact your insurance carrier to obtain pre-certification or prior authorization.    You are strongly encouraged to contact your insurance carrier to verify that your procedure/test has been approved and is a COVERED benefit.  Although the [facility] Centralized Referral Team does its due diligence, the insurance carrier gives the disclaimer that \"Although the procedure is authorized, this does not guarantee payment.\"    Ultimately the patient is responsible for payment.   Thank you for your understanding in this matter.  Paperwork Completion:  If you require FMLA or disability paperwork for your recovery, please make sure to either drop it off or have it faxed to our office at [phone]. Be sure the form has your name and date of birth on it.  The form will be faxed to our Forms Department and they will complete it for you.  There is a 25$ fee for all forms that need to be filled out.  Please be aware there is a 10-14 day turnaround time.  You will need to sign a release of information (OLIVERIO) form if your paperwork does not come with one.  You may call the Forms Department with any questions at [phone].  Their fax number is [phone].

## 2025-02-10 DIAGNOSIS — F90.0 ATTENTION DEFICIT HYPERACTIVITY DISORDER (ADHD), PREDOMINANTLY INATTENTIVE TYPE: ICD-10-CM

## 2025-02-10 RX ORDER — CITALOPRAM HYDROBROMIDE 40 MG/1
40 TABLET ORAL DAILY
Qty: 90 TABLET | Refills: 3 | Status: SHIPPED | OUTPATIENT
Start: 2025-02-10

## 2025-02-10 RX ORDER — DEXTROAMPHETAMINE SACCHARATE, AMPHETAMINE ASPARTATE MONOHYDRATE, DEXTROAMPHETAMINE SULFATE AND AMPHETAMINE SULFATE 7.5; 7.5; 7.5; 7.5 MG/1; MG/1; MG/1; MG/1
30 CAPSULE, EXTENDED RELEASE ORAL EVERY MORNING
Qty: 30 CAPSULE | Refills: 0 | Status: SHIPPED | OUTPATIENT
Start: 2025-02-10

## 2025-02-10 RX ORDER — FAMOTIDINE 20 MG/1
20 TABLET, FILM COATED ORAL 2 TIMES DAILY
Qty: 60 TABLET | Refills: 3 | Status: SHIPPED | OUTPATIENT
Start: 2025-02-10

## 2025-02-25 ENCOUNTER — HOSPITAL ENCOUNTER (OUTPATIENT)
Dept: MRI IMAGING | Facility: HOSPITAL | Age: 55
Discharge: HOME | End: 2025-02-25
Attending: PHYSICIAN ASSISTANT
Payer: COMMERCIAL

## 2025-02-25 DIAGNOSIS — M54.12 CERVICAL RADICULITIS: ICD-10-CM

## 2025-02-25 DIAGNOSIS — M50.20 HERNIATED CERVICAL DISC: ICD-10-CM

## 2025-02-25 PROCEDURE — 72141 MRI NECK SPINE W/O DYE: CPT | Performed by: PHYSICIAN ASSISTANT

## 2025-02-26 ENCOUNTER — TELEPHONE (OUTPATIENT)
Facility: CLINIC | Age: 55
End: 2025-02-26

## 2025-02-26 DIAGNOSIS — M54.2 NECK PAIN: Primary | ICD-10-CM

## 2025-02-26 DIAGNOSIS — F90.0 ATTENTION DEFICIT HYPERACTIVITY DISORDER (ADHD), PREDOMINANTLY INATTENTIVE TYPE: ICD-10-CM

## 2025-02-26 RX ORDER — DEXTROAMPHETAMINE SACCHARATE, AMPHETAMINE ASPARTATE MONOHYDRATE, DEXTROAMPHETAMINE SULFATE AND AMPHETAMINE SULFATE 7.5; 7.5; 7.5; 7.5 MG/1; MG/1; MG/1; MG/1
30 CAPSULE, EXTENDED RELEASE ORAL EVERY MORNING
Qty: 30 CAPSULE | Refills: 0 | Status: SHIPPED | OUTPATIENT
Start: 2025-02-26

## 2025-02-26 NOTE — TELEPHONE ENCOUNTER
Patient requesting amphetamine-dextroamphetamine ER (ADDERALL XR) 30 MG Oral Capsule SR 24 Hr be resent to      [id number] - [place], [place] - [address] [phone], [phone] [[id number]]   Due to availability issues.

## 2025-02-26 NOTE — TELEPHONE ENCOUNTER
New pt appt for spine imaging avail in epic, pt advised to arrive early  Future Appointments  2/28/2025  7:20 AM    Christian Matias MD             [facility]        [facility] [address]

## 2025-02-26 NOTE — TELEPHONE ENCOUNTER
LOV 1/7/25  LRF  2/10/25  We can send it but it is too early to fill and insurance will not fill it yet

## 2025-02-27 NOTE — TELEPHONE ENCOUNTER
Per LOV with Pain and recommendation for surgical referral Imaging ordered and scheduled.     Future Appointments   Date Time Provider Department Center   2/28/2025  6:50 AM NAP XR RM1 [facility] [place]   2/28/2025  7:20 AM Christian Matias MD [facility] [address]

## 2025-02-28 ENCOUNTER — OFFICE VISIT (OUTPATIENT)
Facility: CLINIC | Age: 55
End: 2025-02-28
Payer: COMMERCIAL

## 2025-02-28 ENCOUNTER — HOSPITAL ENCOUNTER (OUTPATIENT)
Dept: GENERAL RADIOLOGY | Age: 55
Discharge: HOME | End: 2025-02-28
Attending: STUDENT IN AN ORGANIZED HEALTH CARE EDUCATION/TRAINING PROGRAM
Payer: COMMERCIAL

## 2025-02-28 DIAGNOSIS — G54.9 MYELORADICULOPATHY: Primary | ICD-10-CM

## 2025-02-28 DIAGNOSIS — M54.2 NECK PAIN: ICD-10-CM

## 2025-02-28 PROCEDURE — 72050 X-RAY EXAM NECK SPINE 4/5VWS: CPT | Performed by: STUDENT IN AN ORGANIZED HEALTH CARE EDUCATION/TRAINING PROGRAM

## 2025-02-28 PROCEDURE — 99205 OFFICE O/P NEW HI 60 MIN: CPT | Performed by: STUDENT IN AN ORGANIZED HEALTH CARE EDUCATION/TRAINING PROGRAM

## 2025-02-28 NOTE — PROGRESS NOTES
SURGERY SCHEDULING SHEET    Carmelo Covington  7/7/1970  [id number]    Procedure: C5-7 ACDF    C5-7 Anterior Cervical Discectomy and Fusion (88893, 71593)  C5-7 Anterior Instrumentation with Cervical Plate and Screws (96190)  C5-7 Interbody Graft Placement (22853x2)    Diagnosis: Cervical myeloradiculopathy    Anesthesia: General    Length of Surgery: 3 hrs    Disposition: Overnight for observation and pain control    Assist: VICKIE Farheen    OR Table: Regular    Position: Supine    Implant:  Seaspine    C-Arm: Yes    Special Equipment: None    Neuromonitoring: Yes    Pre-op Testing: PER ANESTHESIA GUIDELINES    Clearance: OTHER:  PCP    Post op: 2-3 weeks post op    Home health: Yes     Prehab: No    Christian Matias MD  [facility]opedic [facility]  Phone: [phone]  Fax: [phone]

## 2025-02-28 NOTE — H&P
[facility] - ORTHOPEDICS  [address], [address] [address]  [address] [place] [id number]  [phone]     NEW PATIENT VISIT - HISTORY AND PHYSICAL EXAMINATION     Name: Carmelo Covington   MRN: [id number]  Date: 02/28/25       CC: neck and arm pain    REFERRED BY: Rui Garcia DO    HPI:   Carmelo Covington is a very pleasant 54 year old male who presents today for evaluation of neck and arm pain. The distribution of symptoms are: 10% neck pain and 90% arm pain. The symptoms began many year(s) ago without any significant injury. Since the onset, the symptoms have become slowly worse over time.  The patient reports  numbness and  weakness.  The symptom characteristics are as follows: Patient is a 54-year-old male presenting with neck pain down predominantly left upper extremity, associated with numbness and tingling.  Patient also occasionally has balance difficulties as well as trouble with handwriting.  Symptoms have been progressive over the past several years.  Patient has had physical therapy, dry needling, acupuncture, as well as multiple epidural steroid injections without sustained relief..     Prior spine surgery: none.    Bowel and bladder symptoms: absent.    The patient has had issues with balance and/or hand dexterity problems such as changes in penmanship or the use of buttons or zippers.    Treatment up to this time has included:    Evaluation: PCP and other [facility] provider  NSAIDS: have worked well  Narcotic use: None  Physical therapy: competed  Spinal injections: BERNARDO      PMH:   Past Medical History:    Allergic rhinitis    Anxiety    Carpal tunnel syndrome on both sides    Esophageal reflux    Hypothyroid    Hypothyroidism    Obesity       PAST SURGICAL HX:  Past Surgical History:   Procedure Laterality Date    Other surgical history  01/01/2009    Carpal tunnel right hand    Tonsillectomy         FAMILY HX:  Family History   Problem Relation Age of  Onset    Cancer Father     Cancer Maternal Grandmother     Cancer Maternal Grandfather     Heart Disease Neg     Stroke Neg        ALLERGIES:  Patient has no known allergies.    MEDICATIONS:   Current Outpatient Medications   Medication Sig Dispense Refill    amphetamine-dextroamphetamine ER (ADDERALL XR) 30 MG Oral Capsule SR 24 Hr Take 1 capsule (30 mg total) by mouth every morning. 30 capsule 0    famotidine 20 MG Oral Tab Take 1 tablet (20 mg total) by mouth 2 (two) times daily. 60 tablet 3    citalopram 40 MG Oral Tab Take 1 tablet (40 mg total) by mouth daily. 90 tablet 3    predniSONE 10 MG Oral Tab 3 pills for 3 days, 2 pills for 3 days, then one pill daily for 3 days 18 tablet 0    Cholecalciferol 50 MCG (2000 UT) Oral Tab Take by mouth.      oxybutynin ER 5 MG Oral Tablet 24 Hr Take 1 tablet (5 mg total) by mouth daily.      levothyroxine 125 MCG Oral Tab Take 1 tablet (125 mcg total) by mouth before breakfast. 90 tablet 3    tamsulosin 0.4 MG Oral Cap Take 1 capsule (0.4 mg total) by mouth daily.      naproxen 500 MG Oral Tab Take 1 tablet (500 mg total) by mouth 2 (two) times daily with meals. 60 tablet 3    Fenofibrate 54 MG Oral Tab Take 1 tablet (54 mg total) by mouth daily. 90 tablet 2       ROS: A comprehensive 14 point review of systems was performed and was negative aside from the aforementioned per history of present illness.    SOCIAL HX:  Social History     Tobacco Use    Smoking status: Former     Current packs/day: 0.00     Average packs/day: 1 pack/day for 3.0 years (3.0 ttl pk-yrs)     Types: Cigarettes     Start date: 11/25/2018     Quit date: 11/25/2021     Years since quitting: 3.2    Smokeless tobacco: Never   Substance Use Topics    Alcohol use: Yes     Alcohol/week: 6.0 standard drinks of alcohol     Types: 6 Standard drinks or equivalent per week     Comment: a week         PE:   There were no vitals filed for this visit.  Estimated body mass index is 40.61 kg/m² as calculated from  the following:    Height as of 4/24/24: 5' 8.5\" (1.74 m).    Weight as of 2/5/25: 271 lb (122.9 kg).    Physical Exam  Constitutional:       Appearance: Normal appearance.   HENT:      Head: Normocephalic and atraumatic.   Eyes:      Extraocular Movements: Extraocular movements intact.   Cardiovascular:      Pulses: Normal pulses. Skin warm and well perfused.  Pulmonary:      Effort: Pulmonary effort is normal. No respiratory distress.   Skin:     General: Skin is warm.   Psychiatric:         Mood and Affect: Mood normal.     Spine Exam:    Normal gait without difficulty  Able to heel, toe, tandem gait without difficulty  Level shoulders and hips in even stance    Normal C-spine ROM    No tenderness to palpation of C-spine    Spluring: negative    Kumar's: negative  IRR: negative  Sustained clonus: negative     and release: normal  Rhomberg: normal    UE Strength: 5/5 D Bi Tri WE FDS IO  UE Sensation: normal in C5-T1 distribution  UE reflexes: normal    Radiographic Examination/Diagnostics:  XR and MRI personally viewed, independently interpreted and radiology report was reviewed.  X-ray of the cervical spine demonstrates degenerative disc disease at C5-6 and C6-7  MRI of the cervical spine demonstrates moderate to severe foraminal stenosis at C5-6 and C6-7    IMPRESSION: Carmelo Covington is a 54 year old male with cervical myeloradiculopathy    PLAN:     We reviewed the patients history, symptoms, exam findings, and imaging today.  We had a detailed discussion outlining the etiology, anatomy, pathophysiology, and natural history of cervical myeloradiculopathy. The typical management of this condition may include lifestyle modification, NSAIDs, physical therapy, oral steroids, epidural injections, neuromodulatory medications, and sometimes pain medications.     The patient has participated in extensive conservative management and has failed gain any significant improvement in his symptoms over the last 12 months.   The symptoms have failed to respond to conservative measures for greater than 3 months now, to include: prescription strength analgesics and active documented physical therapy or therapeutic exercises including stretching and strengthening.  The patient does not have any cognitive or behavioral issues requiring further evaluation or management.      In addition to the above, he has significant functional impairment and is unable to perform activities of daily living.      We discussed the risks, benefits, and alternatives of treatment.  The patient fully understands the nature of his medical condition and fully understands the nature of the proposed surgical treatment.  I have fully explained all possible alternative treatments or procedures and the patient understands the significant risks involved in the proposed treatment, as well as the risks involved and benefits of all alternative treatments and procedures.     Plan: C5-7 ACDF        Christian Matias MD  Orthopedic Spine Surgeon  [facility]opaedic Surgery   [address] [place] [address].[facility]  [email or web address]  t: [phone]   f: [phone]        This note was dictated using Dragon software.  While it was briefly proofread prior to completion, some grammatical, spelling, and word choice errors due to dictation may still occur.

## 2025-03-14 DIAGNOSIS — F90.0 ATTENTION DEFICIT HYPERACTIVITY DISORDER (ADHD), PREDOMINANTLY INATTENTIVE TYPE: ICD-10-CM

## 2025-03-14 RX ORDER — DEXTROAMPHETAMINE SACCHARATE, AMPHETAMINE ASPARTATE MONOHYDRATE, DEXTROAMPHETAMINE SULFATE AND AMPHETAMINE SULFATE 7.5; 7.5; 7.5; 7.5 MG/1; MG/1; MG/1; MG/1
30 CAPSULE, EXTENDED RELEASE ORAL EVERY MORNING
Qty: 90 CAPSULE | Refills: 0 | Status: SHIPPED | OUTPATIENT
Start: 2025-03-14 | End: 2025-06-12

## 2025-03-14 NOTE — TELEPHONE ENCOUNTER
Pt failed refill protocol for the following reasons:  Requested Renewals       amphetamine-dextroamphetamine ER (ADDERALL XR) 30 MG Oral Capsule SR 24 Hr         Sig: Take 1 capsule (30 mg total) by mouth every morning.    Disp: 30 capsule    Refills: 0    Start: 3/14/2025    Earliest Fill Date: 3/14/2025    Class: Normal    Non-formulary For: Attention deficit hyperactivity disorder (ADHD), predominantly inattentive type    Last ordered: 2 weeks ago (2/26/2025) by Rui Garcia, DO    Patient comment: I'm not sure if I can use [facility] for this Medication    Controlled Substance Medication Ifihia4503/14/2025 07:43 AM    This medication is a controlled substance - forward to provider to refill    Medication is active on med list      To be filled at: None [Patient requested: [facility]]                 Last refill: 2/26/25  Last appt: 1/7/25  Next appt: No future appointments.      Forward to Dr. Garcia, please advise on refills. Thank you.

## 2025-03-31 ENCOUNTER — TELEPHONE (OUTPATIENT)
Dept: ORTHOPEDICS CLINIC | Facility: CLINIC | Age: 55
End: 2025-03-31

## 2025-03-31 DIAGNOSIS — G54.9 MYELORADICULOPATHY: Primary | ICD-10-CM

## 2025-03-31 NOTE — TELEPHONE ENCOUNTER
Date of Surgery: 5/13/25     Post Op Appt:  5/30/25 @ 0830    Case ID: [id number]    Notes: [facility]: YES    Navigator Appt: 4/28/25 @ 0900      SURGERY SCHEDULING SHEET     Carmelo Covington  7/7/1970  [id number]     Procedure: C5-7 ACDF     C5-7 Anterior Cervical Discectomy and Fusion (44182, 99800)  C5-7 Anterior Instrumentation with Cervical Plate and Screws (48119)  C5-7 Interbody Graft Placement (22853x2)     Diagnosis: Cervical myeloradiculopathy     Anesthesia: General     Length of Surgery: 3 hrs     Disposition: Overnight for observation and pain control     Assist: VICKIE CruzFarheen     OR Table: Regular     Position: Supine     Implant:  Seaspine     C-Arm: Yes     Special Equipment: None     Neuromonitoring: Yes     Pre-op Testing: PER ANESTHESIA GUIDELINES     Clearance: OTHER:  PCP     Post op: 2-3 weeks post op     Home health: Yes      Prehab: No     Christian Matias MD  [facility] Orthopedic Surgery  Phone: [phone]  Fax: [phone]

## 2025-03-31 NOTE — TELEPHONE ENCOUNTER
Spoke with Carmelo in regards to getting him scheduled for surgery. I did discuss optional surgical dates with him as he has decided to proceed with surgery on 5/13/25.    I confirmed that surgery will take place at [facility]. I also discussed the surgical protocol and scheduled his post op and spine navigator appt. I also informed him that he would need to see his pcp dr. Garcia for surgical clearance. He states understanding with no questions or concerns. Advised to call the office back if he has any questions or concerns.

## 2025-04-07 ENCOUNTER — TELEPHONE (OUTPATIENT)
Facility: CLINIC | Age: 55
End: 2025-04-07

## 2025-04-08 NOTE — TELEPHONE ENCOUNTER
Received Family Medical Leave Act forms in the forms department via e-mail for sx 5/13/25.  Sent [employer] message for Release of Information authorization . Logged for processing

## 2025-04-09 NOTE — TELEPHONE ENCOUNTER
Called patient to obtain details for Family Medical Leave Act.     Type of Leave:  Continuous Family Medical Leave Act   Reason for Leave: sx   ANTERIOR CERVICAL DISC ARTHROPLASTY 2 LEVEL     Start date of leave: 05/13/25  End date of leave: 3-4 months

## 2025-04-10 NOTE — TELEPHONE ENCOUNTER
Dr. Matias,      Please sign off on form if you agree to:   Family Medical Leave Act due  to sx start date 05/13/25-4 months  -Signature page will be the first page scanned  -From your Inbasket, Highlight the patient and click Chart   -Double click the 04/07/25 Forms Completion telephone encounter  -Scroll down to the Media section   -Click the blue Hyperlink: Dr.Zhu Mason, 04/10/25  -Click Acknowledge located in the top right ribbon/menu   -Drag the mouse into the blank space of the document and a + sign will appear. Left click to   electronically sign the document.  -Once signed, simply exit out of the screen and you signature will be saved.     Thank you,  Americo   Forms Dept

## 2025-04-14 NOTE — TELEPHONE ENCOUNTER
Form completed and e-faxed to [facility]  [phone]; awaiting confirmation. Confirmation received, [employer] message sent to patient.

## 2025-04-28 ENCOUNTER — TELEPHONE (OUTPATIENT)
Dept: FAMILY MEDICINE CLINIC | Facility: CLINIC | Age: 55
End: 2025-04-28

## 2025-04-28 ENCOUNTER — LABORATORY ENCOUNTER (OUTPATIENT)
Dept: LAB | Age: 55
End: 2025-04-28
Attending: STUDENT IN AN ORGANIZED HEALTH CARE EDUCATION/TRAINING PROGRAM
Payer: COMMERCIAL

## 2025-04-28 ENCOUNTER — NURSE ONLY (OUTPATIENT)
Age: 55
End: 2025-04-28
Payer: COMMERCIAL

## 2025-04-28 DIAGNOSIS — G54.9 MYELORADICULOPATHY: ICD-10-CM

## 2025-04-28 DIAGNOSIS — Z01.818 PRE-OP TESTING: ICD-10-CM

## 2025-04-28 DIAGNOSIS — Z71.9 ENCOUNTER FOR EDUCATION: Primary | ICD-10-CM

## 2025-04-28 LAB
ANION GAP SERPL CALC-SCNC: 7 MMOL/L (ref 0–18)
ANTIBODY SCREEN: NEGATIVE
APTT PPP: 33.3 SECONDS (ref 23–36)
BASOPHILS # BLD AUTO: 0.04 X10(3) UL (ref 0–0.2)
BASOPHILS NFR BLD AUTO: 0.5 %
BUN BLD-MCNC: 12 MG/DL (ref 9–23)
CALCIUM BLD-MCNC: 9.9 MG/DL (ref 8.7–10.6)
CHLORIDE SERPL-SCNC: 106 MMOL/L (ref 98–112)
CO2 SERPL-SCNC: 28 MMOL/L (ref 21–32)
CREAT BLD-MCNC: 1.35 MG/DL (ref 0.7–1.3)
EGFRCR SERPLBLD CKD-EPI 2021: 63 ML/MIN/1.73M2 (ref 60–?)
EOSINOPHIL # BLD AUTO: 0.06 X10(3) UL (ref 0–0.7)
EOSINOPHIL NFR BLD AUTO: 0.8 %
ERYTHROCYTE [DISTWIDTH] IN BLOOD BY AUTOMATED COUNT: 13 %
FASTING STATUS PATIENT QL REPORTED: NO
GLUCOSE BLD-MCNC: 102 MG/DL (ref 70–99)
HCT VFR BLD AUTO: 45.3 % (ref 39–53)
HGB BLD-MCNC: 15.5 G/DL (ref 13–17.5)
IMM GRANULOCYTES # BLD AUTO: 0.02 X10(3) UL (ref 0–1)
IMM GRANULOCYTES NFR BLD: 0.3 %
INR BLD: 1.08 (ref 0.8–1.2)
LYMPHOCYTES # BLD AUTO: 2.34 X10(3) UL (ref 1–4)
LYMPHOCYTES NFR BLD AUTO: 32.1 %
MCH RBC QN AUTO: 29.5 PG (ref 26–34)
MCHC RBC AUTO-ENTMCNC: 34.2 G/DL (ref 31–37)
MCV RBC AUTO: 86.3 FL (ref 80–100)
MONOCYTES # BLD AUTO: 0.58 X10(3) UL (ref 0.1–1)
MONOCYTES NFR BLD AUTO: 8 %
NEUTROPHILS # BLD AUTO: 4.24 X10 (3) UL (ref 1.5–7.7)
NEUTROPHILS # BLD AUTO: 4.24 X10(3) UL (ref 1.5–7.7)
NEUTROPHILS NFR BLD AUTO: 58.3 %
OSMOLALITY SERPL CALC.SUM OF ELEC: 292 MOSM/KG (ref 275–295)
PLATELET # BLD AUTO: 256 10(3)UL (ref 150–450)
POTASSIUM SERPL-SCNC: 4.3 MMOL/L (ref 3.5–5.1)
PROTHROMBIN TIME: 14.2 SECONDS (ref 11.6–14.8)
RBC # BLD AUTO: 5.25 X10(6)UL (ref 4.3–5.7)
RH BLOOD TYPE: POSITIVE
SODIUM SERPL-SCNC: 141 MMOL/L (ref 136–145)
WBC # BLD AUTO: 7.3 X10(3) UL (ref 4–11)

## 2025-04-28 PROCEDURE — 86901 BLOOD TYPING SEROLOGIC RH(D): CPT

## 2025-04-28 PROCEDURE — 85730 THROMBOPLASTIN TIME PARTIAL: CPT

## 2025-04-28 PROCEDURE — 87147 CULTURE TYPE IMMUNOLOGIC: CPT

## 2025-04-28 PROCEDURE — 86900 BLOOD TYPING SEROLOGIC ABO: CPT

## 2025-04-28 PROCEDURE — 87081 CULTURE SCREEN ONLY: CPT

## 2025-04-28 PROCEDURE — 86850 RBC ANTIBODY SCREEN: CPT

## 2025-04-28 PROCEDURE — 80048 BASIC METABOLIC PNL TOTAL CA: CPT

## 2025-04-28 PROCEDURE — 85025 COMPLETE CBC W/AUTO DIFF WBC: CPT

## 2025-04-28 PROCEDURE — 85610 PROTHROMBIN TIME: CPT

## 2025-04-28 PROCEDURE — 36415 COLL VENOUS BLD VENIPUNCTURE: CPT

## 2025-04-28 NOTE — TELEPHONE ENCOUNTER
Preop: cervical fusion, 5/13/25, [facility],[phone]     Future Appointments   Date Time Provider Department Center   4/28/2025  3:45 PM [facility] [place]   5/5/2025  3:00 PM Rui Garcia DO [facility] [place]   5/30/2025  8:30 AM Deidra Zhong APRN [facility] [address]

## 2025-04-28 NOTE — PROGRESS NOTES
RN Spine Navigator Education for Carmelo Covington     If you have received instructions from your surgeon that are different from those listed below, please follow your physician's instructions.    You are scheduled for a Cervical fusion with Dr. Matias on 5/13/25.      Patient Surgical Goals: Carmelo suffers from severe pain in his neck and shoulders with numbness in his left arm and hand which interfere with his ability to do his day-to-day activities.  His goal for surgery is pain relief, increased mobility, better range of motion, and better quality of life. He hopes this surgery will allow him to lift his arms over his shoulders and do overhead activities without pain or restrictions.    Before Your Surgery    Choose a Care Partner  Patient attended spine navigator visit independently.  Care partner is his wife Romelia. Your care partner(s) should be able to provide transportation to and from the hospital. They should be able to help at home for the first week after discharge, including helping you with meals, medication, and dressing changes.    Clearance Before Surgery Patient will call Surgeons office to confirm if it is needed or not.   You will need to see your primary care provider within 30 days before surgery. Please make sure this appointment is at least a week before surgery as more testing or doctor visits may be ordered. Presurgical testing may include labs, nasal swab, imaging, EKG.   Make sure that you complete all preadmission testing so that surgery does not get delayed.    Home Environment  Assessed home status: home has stairs, bathroom and bedroom are upstairs, and patient has pets. Suggested arrangements for pet care and help at home.  It is recommended that you prepare your home by putting clean sheets on your bed, freezing meals, and putting frequently used items at waist level.   Prevent falls by removing items that could cause you to trip, adding nightlights and adding a nonskid mat in shower.    Assistive devices can be purchased at a medical supply store or online including canes, walkers, toileting devices (commodes, raised toilet seats, toilet paper wiping aid), long handled sponge, shower chair or tub transfer bench, grabber/reacher tool, sock aid, long handled shoehorn, if needed.      Tobacco, Nicotine and Marijuana Use   Educated on the importance of nicotine cessation and Smoking any amount or use of nicotine products can delay or prevent healing. Do not use nicotine products for at least two weeks after surgery    Medications to Stop   For 7-10 days before surgery do not take any blood thinning medications. This includes non-steroidal anti-inflammatories or NSAIDs (Advil, Aleve, Motrin, ibuprofen, naproxen, meloxicam, diclofenac, celecoxib, etc.), aspirin (unless told otherwise by your cardiologist or surgeon), herbal supplements and vitamins (garlic, turmeric, vitamin E, fish oil or krill oil, etc.). You may only take Tylenol or prescribed narcotic medication if needed for pain.   Other medications to stop include:   Adderall- Do not take the morning of surgery    Leading Up to Day of Surgery  Five days before surgery wash with Hibiclens soap after your regular body soap every day. Do not put use Hibiclens on your face, hair or privates. Your last shower should be the night before surgery.  One-two business days before surgery, the preadmission testing staff will call you and let you know what time to arrive, where to park, when to take your Tylenol and Gatorade, and will provide any additional instructions.   After 11pm the day before surgery, do not eat or drink anything (including water, gum, or candies) except for Tylenol and Gatorade.   Drink 12 ounces of regular Gatorade (NOT RED) 12 hours prior to your scheduled surgery time. Drink your second 12 ounces of regular Gatorade and take 1000 mg of Tylenol (acetaminophen) 4 hours before your scheduled surgery time.     Items to Bring to the  Hospital   Bring insurance card, ID, advanced directive, or medical power of  paperwork, loose fitting clothing, shoes with a back that can accommodate swollen feet, long phone charging cord, glasses.  Do not bring jewelry, valuables, or medications.   If you take an uncommon medication that the hospital may not have, it must be brought to the hospital in the original container, and you must notify the nurse of this medication.     In the Hospital     Operative Day and Hospital Stay  In the preoperative area, you will change into a gown, have an IV placed in your hand or arm by the nurse, and sign any consent paperwork that is needed for your procedure. You will speak to the surgeon and anesthesiologist. It is important to tell the doctors and nurses if you have had any significant side effects from pain medications or anesthesia such as a rash or severe nausea.    In the operating room, the anesthesiologist will attach monitors, give you oxygen through a mask, and give you medicine through the IV to fall asleep. After you are sleeping, the breathing tube will be placed. The surgeon may use additional nerve monitoring during your surgery. This is to make sure that the muscles and nerves in your arms and legs are working normally as he operates. The equipment will be hooked up and removed while you are asleep. You will wake up on the hospital bed.     During the surgery, your care partner can sit in the surgical waiting area. There are TV screens in that area that keep them informed of your progress. If the procedure is at [facility], there is a person who can speak to them about your surgical progress.     In the recovery room, monitors will be attached that check your heart rate, blood pressure and oxygen levels. While you may not remember this part, a nurse is with you and constantly checking on your condition. Medications for pain and nausea will be given if you need them. You may have a russo catheter to  empty your bladder or a drain at your surgical site. Your family is not allowed in the recovery room. When you are ready to leave the recovery room, you will be transported on your bed to the inpatient unit accompanied by your family once a room is available.  On the inpatient unit, a team of doctors and advanced practice providers will manage your care. The spine care nurses will check your blood pressure, temperature, heartbeat, breathing, stomach sounds and your incision. They may assess the strength and sensation in your arms and legs. Medications that are given in the hospital include antibiotics, IV fluids, pain medications, muscle relaxers, stool softeners, and your home medications. You may get blood drawn and another x-ray. Physical and occupational therapists may come to your room to teach you how to move around safely after surgery.     Post Op Plan   The average length of hospital stay is one to three days. A  may visit you to help arrange extra care for you once you leave the hospital. Occasionally, it is recommended that a home health nurse or therapist visit you in your home for a short time. The best place to recover is your own home, but if you need more assistance than home health and your care partner can provide, the  will help you and your family choose other facilities to help you recover your strength.    Preventing surgical complications  It is important to follow all instructions before and after surgery to decrease the risks of surgery and prevent complications.     Blood clots: walk, wear leg compression devices in the hospital, and do ankle pumps at home  Infection: wash with Hibiclens before surgery, wash your hands, don't touch your incision  Pneumonia: take deep breaths and cough and use the breathing exercise (incentive spirometer)   Nausea/vomiting: start with liquids and small meals and do not take pills on an empty stomach  Constipation: drink water and walk  frequently  If you are prone to constipation, start an over the counter stool softener while taking your narcotic medication.  After surgery if you have gone 3-4 days without a bowel movement, we recommend you use either a suppository or an enema (follow the packaging instructions for use). The longer you stay constipated the more painful and difficult it will be to relieve your constipation.      Tell your nurse if you are experiencing pain, nausea, vomiting or constipation as they have medications to help treat these.      At home     Understanding Pain After Surgery  We will do our best to manage your pain after surgery, but it is not possible to be completely pain-free. There will be operative pain in your back or neck. Pain in the arms or legs may be one of the first things to improve. Numbness, weakness, and tingling should improve over time. However, there can be a temporary increase in symptoms in the first few days due to inflammation from surgery.   Pain medications will be prescribed to take home at discharge. The goal of pain medicine after surgery is to make your pain tolerable, not to make you pain free. We encourage you to use the medication prescribed to you after your surgery, but please take the lowest possible dose to manage your pain. Taking high doses of narcotics can cause side effects. Transition to plain Tylenol when your pain improves. At [facility], your prescriptions can be filled by our pharmacy and brought to you bedside. You may get more continuous pain relief by alternating between medications if you have multiple instead of taking them at the same time. Write down when you have taken a medication as it may be difficult to remember after a few doses.    Post operative medication   Tylenol (acetaminophen): take for pain. Do not take more than 3000 mg - 4000 mg in 24 hours because it can damage your liver.   Narcotics: take for moderate to severe pain. Do not drink alcohol or drive  while taking narcotics. Some narcotic medications (Norco, Tylenol #3, Percocet, Vicodin) contain Tylenol (acetaminophen). Make sure to not exceed the maximum dose if you are taking additional Tylenol with these medications.  Muscle relaxers: take for muscle cramping. These can cause drowsiness.    NSAIDS (Advil, Aleve, Motrin, ibuprofen, naproxen, meloxicam, diclofenac, celecoxib, etc.) or aspirin: Do not take these unless your physician says it is ok. For a fusion, it may be several months before you can take NSAIDS.  Stool softeners: take to prevent constipation while you are taking narcotic medications. You can get these over the counter at the pharmacy. You may use laxatives, which are stronger, if needed.    If you believe you will need more of medication your surgeon has prescribed to you, request a refill through your pharmacy or through the refill request in [employer] (log in, go to medications, then select refill request) at least two business days before you run out of medication.     Nonpharmacologic pain management   You may use ice on your incision for 20 minutes every hour to help with pain and swelling. Do not place ice directly on your skin. You can use heat to sooth your muscles but avoid placing heat directly on your incision. Make frequent position changes. You can do gentle stretching while avoiding significant bending or twisting. Use deep breathing techniques and distractions such as TV, music, reading, or games.     Movement restrictions  After surgery, no bending or twisting. Do not lift anything over 10lbs (about the weight of a gallon of milk) or lift anything over your shoulders. Avoid pulling or pushing. You may use stairs while holding the handrail.  It is ok to ride in the car but refrain from driving or traveling long distances until cleared to do so by your surgeon. You may not drive while taking narcotics or muscle relaxants. If you have cervical surgery, it may be several weeks before  you can drive.      Post Op Exercise   Walk frequently. Start with walking short distances and increase as you start to feel better. Do ankle pumps (bending at your ankles, bring your feet towards your head then point them towards the ground) 15-20 times every hour when awake to help prevent blood clots.     Your surgeon will let you know at your post operative appointment if you are ready to decrease your movement restrictions and increase your exercise. If you have questions in between appointments about lessening your restrictions, please contact the office.     You and your doctor will discuss how you are feeling as you heal and decide if outpatient physical therapy or a medical fitness referral is needed.    Caring for your incision  Always wash your hands before touching your incision. Your incision will be closed with sutures under the skin and skin glue or Steri-Strips (thin white bandages) on top of the skin. Do not attempt to peal off Skin glue/Steri-Strips as they will come off on their own. If the incision is closed with sutures or staples on top of the skin, they will be removed at a post op appointment. The incision may be covered with a gauze dressing that can come off after three days. Once the original gauze dressing is removed, we prefer that you leave your incision open to air (without a gauze dressing). If the incision has drainage or is rubbing against your clothes or brace, you may place gauze and medical tape over it. Change the gauze and medical tape daily. Look at your incision daily to check for signs of infection.   You can shower three days after your surgery or sooner if your surgeon allows. We recommend the care partner be present during the first shower for safety. Let soapy water run over the incision, but do not scrub it or spray it directly. Gently pat it dry after with a clean towel. Do not apply any creams or lotions to the incision. Do not soak in a tub, pool, or any body of  water until your incision is fully healed.    Signs of Infection   Check your incision daily for swelling, redness, drainage, pus, bad smell, or opening of the incision.     What is and is not normal after a cervical fusion    What is normal:  Cervical pain or discomfort  Bruising in the surgical area and around the incision site  Temporary numbness or abnormal sensation around the incision area  Neck stiffness or movement restriction in the initial post operative period  Sore throat, hoarseness (usually improves in 3-7 days)  Difficulty swallowing, having to swallow multiple times for solid food  Sensation of having something in your throat    What is not normal:  Severe difficulty swallowing, unable to swallow water without coughing or choking  Severe swelling of the neck or tongue   Voice change or loss of voice that does not improve after a few days  Trouble breathing  Unable to close mouth or excessive drooling due to swelling of throat or tongue   Signs of infection    Additional Recommendations for Anterior Cervical Surgery  Smoothies or protein shakes may be more comfortable to consume then solid food for the first week after surgery. To help decrease throat swelling, suck on ice chips and drink cold liquids. Cough drops can also be help decrease throat irritation.      When to Call for Assistance  Call the Ortho Spine Office ([phone] Option #3) if you experience signs of infection, opening of the incision, continuous nausea or vomiting, poor pain control despite using the pain medication as directed, a sudden increase in pain, temperature over 101F, or with any concerns, unanswered questions, or new problems, such as new numbness/weakness/tingling.  Call [phone] or go to the nearest emergency room if you experience chest pain, difficulty breathing, loss of bowel or bladder control, extreme drowsiness, or any other life-threatening situation.     Please remember that the office of Dr. Matias is closed on the  weekends, holidays, and there is no one in the office from 4:30 pm to 8 am. If you have an urgent matter that needs attention you will need to go to the emergency room or immediate care for assistance. If it is an urgent matter during work hours please make sure to call the office of Dr. Matias as [employer] messages are not meant for Urgent/Emergent situations. [email or web address] messages can take 5-7 days for a response.      Follow-up Plan   Appointments with Dr. Matias or Deidra at 2, 6 and 12 weeks  5/30/25 at 830 with DIMAS Holden    Questions: Carmelo was very pleasant to speak with this morning, he acknowledged all information provided and had no additional questions at this time.     If you would like clarification regarding anything discussed today, you can call your surgeons office and speak with one of the nursing staff. If you feel you require and additional appointment to review information again you can call the RN Spine Navigator at [phone] #[phone] to schedule. If you have questions about your upcoming surgery, changes in your symptoms, or if you need to refill your medications please do not call the RN spine navigator, you will need to speak with someone from your surgeons office.     Spine navigator spent 36 minutes conducting a virtual visit to provide education. Thank you for letting the RN Spine Navigator participate in your care.

## 2025-05-05 ENCOUNTER — OFFICE VISIT (OUTPATIENT)
Dept: FAMILY MEDICINE CLINIC | Facility: CLINIC | Age: 55
End: 2025-05-05
Payer: COMMERCIAL

## 2025-05-05 VITALS
HEART RATE: 112 BPM | OXYGEN SATURATION: 95 % | BODY MASS INDEX: 40 KG/M2 | TEMPERATURE: 98 F | SYSTOLIC BLOOD PRESSURE: 124 MMHG | DIASTOLIC BLOOD PRESSURE: 82 MMHG | WEIGHT: 272.13 LBS | RESPIRATION RATE: 16 BRPM

## 2025-05-05 DIAGNOSIS — M54.2 NECK PAIN ON LEFT SIDE: ICD-10-CM

## 2025-05-05 DIAGNOSIS — R20.2 PARESTHESIA OF LEFT ARM: ICD-10-CM

## 2025-05-05 DIAGNOSIS — M54.12 LEFT CERVICAL RADICULOPATHY: ICD-10-CM

## 2025-05-05 DIAGNOSIS — Z01.818 PREOP EXAMINATION: Primary | ICD-10-CM

## 2025-05-05 RX ORDER — MUPIROCIN 2 %
1 OINTMENT (GRAM) TOPICAL 2 TIMES DAILY
Qty: 15 G | Refills: 0 | Status: ON HOLD | OUTPATIENT
Start: 2025-05-05 | End: 2025-05-13 | Stop reason: CLARIF

## 2025-05-05 NOTE — TELEPHONE ENCOUNTER
Mupirocin pending    DOS: 05/13/25    No MRSA Isolated   2+ growth Staphylococcus aureus Abnormal    MSSA (Methicillin Sensitive Staph aureus) Isolated        Called and reviewed with the patient   Your test results were positive for MRSA/MSSA - this does not mean you have an infection, rather it is a bacteria that can live on the skin and has the capability to cause infections. In order to eliminate the bacteria and decrease infection risk, we perform a \"decolonization\" procedure that requires you to use a nasal antibiotic ointment called Mupirocin. A prescription for this ointment has been sent to your pharmacy, please contact your pharmacy for an update.       Mupirocin Application:   Wash your hands before applying Mupirocin   Place a pea size amount of ointment onto the tip of a cotton swab.   Insert only the cotton portion of the swab inside your nose and coat the inside of your nostril with ointment.   Repeat the process on the other nostril using a fresh cotton swab   Press the inside of the nose together gently massaging to spread the ointment throughout the inside of the nostril.       Do this in the morning and at bedtime for 5 days. Also, do an application at home the day of surgery.     Dr. Garcia office called to verify if we will go over MSSA treatment or if they should.  We let them know we would address it.

## 2025-05-05 NOTE — PROGRESS NOTES
Carmelo Covington is a 54 year old male who presents for a pre-operative physical exam. Patient is to have  (CERVICAL 5-CERVICAL 6, CERVICAL 6-CERVICAL 7 ANTERIOR CERVICAL DISCECTOMY AND FUSION on 5/13/25 with Dr Matias at [facility]) ,   HPI:   Pt complains of having had persistent left arm and neck pain. He has failed conservative therapy, PT and injections. He is experiencing numbness tingling and weakness in his arm. He has gone through a series of injections his last one was in January and did not work and referred to Dr. Matias. Left hand  dominant, and notes weakness in his arm, has had  pain burning and tingling in the left arm.     Current Outpatient Medications   Medication Sig Dispense Refill    amphetamine-dextroamphetamine ER (ADDERALL XR) 30 MG Oral Capsule SR 24 Hr Take 1 capsule (30 mg total) by mouth every morning. 90 capsule 0    famotidine 20 MG Oral Tab Take 1 tablet (20 mg total) by mouth 2 (two) times daily. 60 tablet 3    citalopram 40 MG Oral Tab Take 1 tablet (40 mg total) by mouth daily. 90 tablet 3    oxybutynin ER 5 MG Oral Tablet 24 Hr Take 1 tablet (5 mg total) by mouth in the morning.      levothyroxine 125 MCG Oral Tab Take 1 tablet (125 mcg total) by mouth before breakfast. 90 tablet 3    tamsulosin 0.4 MG Oral Cap Take 1 capsule (0.4 mg total) by mouth in the morning.      Fenofibrate 54 MG Oral Tab Take 1 tablet (54 mg total) by mouth daily. 90 tablet 2    naproxen 500 MG Oral Tab Take 1 tablet (500 mg total) by mouth 2 (two) times daily with meals. (Patient not taking: Reported on 5/5/2025) 60 tablet 3      Allergies: No Known Allergies   Past Medical History:    Allergic rhinitis    Anxiety    Back problem    Carpal tunnel syndrome on both sides    Disorder of thyroid    Esophageal reflux    Hypothyroid    Hypothyroidism    Obesity    Visual impairment    glasses      Past Surgical History:   Procedure Laterality Date    Other surgical history  01/01/2009    Carpal tunnel right hand     Tonsillectomy        Family History   Problem Relation Age of Onset    Cancer Father     Cancer Maternal Grandmother     Cancer Maternal Grandfather     Heart Disease Neg     Stroke Neg       Social History:   Social History     Socioeconomic History    Marital status:    Tobacco Use    Smoking status: Former     Current packs/day: 0.00     Average packs/day: 1 pack/day for 3.0 years (3.0 ttl pk-yrs)     Types: Cigarettes     Start date: 11/25/2018     Quit date: 11/25/2021     Years since quitting: 3.4    Smokeless tobacco: Never   Vaping Use    Vaping status: Every Day    Substances: Nicotine    Devices: Disposable   Substance and Sexual Activity    Alcohol use: Yes     Comment: 1-2 times per week    Drug use: Never   Other Topics Concern    Weight Concern Yes     Comment: cant loss weight    Caffeine Concern No    Exercise No     Social Drivers of Health      Received from [facility]    Housing Stability         REVIEW OF SYSTEMS:   GENERAL: feels well otherwise  SKIN: denies any unusual skin lesions  EYES:denies blurred vision or double vision  HEENT: denies nasal congestion, sinus pain or ST  LUNGS: denies shortness of breath with exertion  CARDIOVASCULAR: denies chest pain on exertion  GI: denies abdominal pain,denies heartburn  : denies dysuria, denies nocturia or changes in stream  MUSCULOSKELETAL: neck pain , left sided with radiculopathy, left arm weakness and loss of muscle mass  NEURO: denies headaches  PSYCHE: denies depression or anxiety  HEMATOLOGIC: denies hx of anemia  ENDOCRINE: denies thyroid history  ALL/ASTHMA: denies hx of allergy or asthma    EXAM:   /82   Pulse 112   Temp 97.9 °F (36.6 °C) (Temporal)   Resp 16   Wt 272 lb 2 oz (123.4 kg)   SpO2 95%   BMI 40.19 kg/m²   GENERAL: well developed, well nourished,in no apparent distress  SKIN: no rashes,no suspicious lesions  HEENT: atraumatic, normocephalic,ears and throat are clear  EYES:PERRLA, EOMI,  normal optic disk,conjunctiva are clear  NECK: supple,no adenopathy,no bruits, has reproducible pain over the the left cervical paraspinals  CHEST: no chest tenderness  LUNGS: clear to auscultation  CARDIO: RRR without murmur  GI: good BS's,no masses, HSM or tenderness  MUSCULOSKELETAL: back is not tender,FROM of the back, has restricted rotation  to the left of the cervical , the left arm is visible atrophied compared to the right, motor is diminished compared to the right with flexion/extension, and  strength  EXTREMITIES: no cyanosis, clubbing or edema  NEURO: Oriented times three,cranial nerves are intact,motor and sensory are grossly intact, LEFT HAND DOMINANT    ASSESSMENT AND PLAN:     Encounter Diagnoses   Name Primary?    Preop examination Yes    Left cervical radiculopathy     Neck pain on left side     Paresthesia of left arm    Reviewed his labs , COMPLETE BLOOD COUNT/CMP/PT/PTT, APPEARED ACCEPTABLE, NASAL SWAB TESTED POSITIVE FOR MSSA, DR. MATIAS OFFICE AWARE, WILL TREAT,  NO ISSUES WITH ANESTHESIA IN THE PAST      Carmelo Covington is a 54 year old male who presents for a pre-operative physical exam. Patient is to have (CERVICAL 5-CERVICAL 6, CERVICAL 6-CERVICAL 7 ANTERIOR CERVICAL DISCECTOMY AND FUSION on 5/13/25 with Dr Matias at [facility]) , Pt has no significant history of cardiac or pulmonary conditions. Pt is a good surgical candidate. This consult was sent back the referring physician, Dr. Matias.   MEDICALLY CLEAR FOR SURGERY

## 2025-05-12 NOTE — DISCHARGE INSTRUCTIONS
Sometimes managing your health at home requires assistance.  The [facility] team has recognized your preference to use [facility].  They can be reached by phone at [phone].  The fax number for your reference is [phone].. A representative from the home health agency will contact you or your family to schedule your first visit.      Spine Surgery Postoperative Instructions    Wound / Dressing Care:    Before changing your dressing, wash your hands (or ask your helper to wash their hands for 20 seconds). Do not apply creams, solutions, or ointments to the incision.     You may remove the dressing 3 days after surgery, if there is no further drainage, and shower. If there is drainage, cover the wound with new dressing and wait until drainage is no longer present.    Apply sterile dressing to wound until 7 days after surgery, then you may leave wound open to air if there is no drainage.    Check the incision for increased warmth, redness, swelling, unexplained increase in pain, change in the drainage, or persistent drainage that is not decreasing. Call Dr. Matias's office [phone] if there are changes or concerns.     If you have Steri strips, please leave them on until they are loose and almost falling off. In this case, you may then gently lift off the Steri strips.          Showering:   Do no apply water direct over the wound. It's ok to let water run over the incision. Pat the incision dry with a clean towel and apply new dressing if less than 7 days.    Do not bath, swim, or soak in water until cleared by your surgeon.        Medications:  Please resume your pre-hospital medications unless otherwise instructed. If you usually take blood thinners, you will be given instructions about when to resume them.      Please inform your primary care physician about your admission to the hospital and if there has been a change in your usual medications, while you were hospitalized.        Managing pain:  You may have some ups and downs with your pain. You may be encouraged if you notice the gradual improvement in the pain as the days go by. You may be able to take the edge off the pain but not stop all your pain, however. Pain is part of the healing process after injury and surgery.      Please follow the plan below to help control your pain and reduce the amount of narcotics you require.   - Take 1000 mg of Acetaminophen (Tylenol) three times a day scheduled for first 5 days.  Do not exceed more than 3,000 mg in a 24 hour period or mix with other medications that contain acetaminophen. Take Tylenol as needed if no longer requiring supplemental narcotics  - Supplement with Oxycodone 5-10mg every 4 hours as needed  - For muscle spasm type pain take muscle relaxant every 8 hours as needed    As your pain improves, please decrease the amount of pain medication (by taking fewer tablets and/or increasing the time between doses). Do not increase the dose once you have dropped down to a lesser amount.     Hold your pain medication if you experience over sedation (sleeping too much), slurred speech, slow breathing, or hallucinations. If these symptoms occur, you will need to get advice on how and when it is safe to resume your pain medication. Please call for advice.     Please do not drink alcohol, drive, or operate heavy machinery while taking your pain or anti spasm medication.     For surgery related pain medication refills, if needed, please call the spine clinic directly [phone]  (please leave a message for call back) or your usual pain prescribing clinician.    Constipation:   Pain medication often causes constipation.    Try standing and moving for 1 minute each hour and work up to walking 30 minutes a day.    Drink fluids (32-64 ounces every day) unless patient has cardiac history    Minimize narcotic use    Use natural laxatives such as prune juice, but avoid coffee or caffeinated  products that may disturb sleep cycle    Have high fiber diet, have smaller meals throughout the day    Take Miralax mixed with water or juice BID and Senna-S nightly until regular BM. Hold for loose, frequent, or water stools    If no BM, take Senna-s twice a day       Smoking:   Failure of fusion is as high as 65% in smokers and nicotine users. Therefore, spine patients should not smoke or use nicotine for 6 months after surgery. This is your time to quit.      Activity:   Walking is encouraged.     Avoid heavy lifting (no more than 10 pounds). Do bend at the waist to lift anything. Avoid extreme twisting.    You may not drive a car until cleared to do so by the spine team. Please wear a seat belt.      Sexual Activity:   After 2 weeks, when comfortable and approved by your surgeon. Stop if causing pain.     When should you call the office: Call if  You have increased drainage and/or odor from you wound.     You have increased redness/swelling at the incision site or unexplained incisional pain.     You have a fever of greater than 101 degrees that lasts for several hours. Keep in mind that elevated temperature is common within the first several days after surgery. If available, take Tylenol and do deep breathing and coughing to reduce the temperature. If the fever persists after 5 days from surgery, then contact your surgeon.    You have new or unfamiliar pain or weakness in your arms or legs.     You have loss of control of urination or bowel movements, pain or numbness in the rectal, vaginal, or scrotal area.     Constipation is very common especially when taking pain medications. If stool softeners, laxatives, and other treatments do not work, contact your PCP or surgeon.    You have new tenderness in your calf, redness or discoloration of the leg, new shortness of breath, cough up blood, or have chest pain. These may be signs of a blood clot.     For life threatening emergency including difficulty breathing  or chest pain, please call 911.     Follow-up:  If you have questions regarding your appointment or if an appointment has not been made, please speak with your surgeon's staff [phone].

## 2025-05-13 ENCOUNTER — APPOINTMENT (OUTPATIENT)
Dept: GENERAL RADIOLOGY | Facility: HOSPITAL | Age: 55
End: 2025-05-13
Attending: STUDENT IN AN ORGANIZED HEALTH CARE EDUCATION/TRAINING PROGRAM
Payer: COMMERCIAL

## 2025-05-13 ENCOUNTER — ANESTHESIA (OUTPATIENT)
Dept: SURGERY | Facility: HOSPITAL | Age: 55
End: 2025-05-13
Payer: COMMERCIAL

## 2025-05-13 ENCOUNTER — ANESTHESIA EVENT (OUTPATIENT)
Dept: SURGERY | Facility: HOSPITAL | Age: 55
End: 2025-05-13
Payer: COMMERCIAL

## 2025-05-13 ENCOUNTER — HOSPITAL ENCOUNTER (OUTPATIENT)
Facility: HOSPITAL | Age: 55
Discharge: HOME HEALTH | End: 2025-05-14
Attending: STUDENT IN AN ORGANIZED HEALTH CARE EDUCATION/TRAINING PROGRAM | Admitting: STUDENT IN AN ORGANIZED HEALTH CARE EDUCATION/TRAINING PROGRAM
Payer: COMMERCIAL

## 2025-05-13 DIAGNOSIS — Z98.1 ARTHRODESIS STATUS: ICD-10-CM

## 2025-05-13 DIAGNOSIS — G54.9 MYELORADICULOPATHY: Primary | ICD-10-CM

## 2025-05-13 DIAGNOSIS — Z01.818 PRE-OP TESTING: ICD-10-CM

## 2025-05-13 PROCEDURE — 99203 OFFICE O/P NEW LOW 30 MIN: CPT | Performed by: INTERNAL MEDICINE

## 2025-05-13 PROCEDURE — 76000 FLUOROSCOPY <1 HR PHYS/QHP: CPT | Performed by: STUDENT IN AN ORGANIZED HEALTH CARE EDUCATION/TRAINING PROGRAM

## 2025-05-13 DEVICE — CERVICAL PLATE, 2 LEVEL, 30MM
Type: IMPLANTABLE DEVICE | Site: NECK | Status: FUNCTIONAL
Brand: ADMIRAL

## 2025-05-13 DEVICE — MAGNETOS EASYPACK PUTTY 2.5CC 1-2MM USA
Type: IMPLANTABLE DEVICE | Site: SPINE CERVICAL | Status: FUNCTIONAL
Brand: MAGNETOS

## 2025-05-13 DEVICE — INTERBODY, 18X15X7MM, 7 DEGREE, RT
Type: IMPLANTABLE DEVICE | Site: NECK | Status: FUNCTIONAL
Brand: SHORELINE RT

## 2025-05-13 DEVICE — 4.0MM VARIABLE ANGLE SCREW, SELF-TAPPING, 16MM
Type: IMPLANTABLE DEVICE | Site: NECK | Status: FUNCTIONAL
Brand: ADMIRAL

## 2025-05-13 RX ORDER — ESCITALOPRAM OXALATE 20 MG/1
20 TABLET ORAL DAILY
Status: DISCONTINUED | OUTPATIENT
Start: 2025-05-13 | End: 2025-05-14

## 2025-05-13 RX ORDER — ACETAMINOPHEN 10 MG/ML
1000 INJECTION, SOLUTION INTRAVENOUS ONCE
Status: COMPLETED | OUTPATIENT
Start: 2025-05-13 | End: 2025-05-13

## 2025-05-13 RX ORDER — HYDROMORPHONE HYDROCHLORIDE 1 MG/ML
0.8 INJECTION, SOLUTION INTRAMUSCULAR; INTRAVENOUS; SUBCUTANEOUS EVERY 2 HOUR PRN
Status: DISCONTINUED | OUTPATIENT
Start: 2025-05-13 | End: 2025-05-14

## 2025-05-13 RX ORDER — TAMSULOSIN HYDROCHLORIDE 0.4 MG/1
0.4 CAPSULE ORAL DAILY
Status: DISCONTINUED | OUTPATIENT
Start: 2025-05-13 | End: 2025-05-14

## 2025-05-13 RX ORDER — ACETAMINOPHEN 500 MG
1000 TABLET ORAL ONCE AS NEEDED
Status: DISCONTINUED | OUTPATIENT
Start: 2025-05-13 | End: 2025-05-13 | Stop reason: HOSPADM

## 2025-05-13 RX ORDER — SODIUM CHLORIDE, SODIUM LACTATE, POTASSIUM CHLORIDE, CALCIUM CHLORIDE 600; 310; 30; 20 MG/100ML; MG/100ML; MG/100ML; MG/100ML
INJECTION, SOLUTION INTRAVENOUS CONTINUOUS
Status: DISCONTINUED | OUTPATIENT
Start: 2025-05-13 | End: 2025-05-14

## 2025-05-13 RX ORDER — HYDROMORPHONE HYDROCHLORIDE 1 MG/ML
INJECTION, SOLUTION INTRAMUSCULAR; INTRAVENOUS; SUBCUTANEOUS
Status: COMPLETED
Start: 2025-05-13 | End: 2025-05-13

## 2025-05-13 RX ORDER — HYDROMORPHONE HYDROCHLORIDE 1 MG/ML
0.2 INJECTION, SOLUTION INTRAMUSCULAR; INTRAVENOUS; SUBCUTANEOUS EVERY 5 MIN PRN
Status: DISCONTINUED | OUTPATIENT
Start: 2025-05-13 | End: 2025-05-13 | Stop reason: HOSPADM

## 2025-05-13 RX ORDER — OXYCODONE HYDROCHLORIDE 5 MG/1
5 TABLET ORAL
Qty: 20 TABLET | Refills: 0 | Status: SHIPPED | OUTPATIENT
Start: 2025-05-13

## 2025-05-13 RX ORDER — SODIUM CHLORIDE 9 MG/ML
INJECTION, SOLUTION INTRAVENOUS CONTINUOUS
Status: DISCONTINUED | OUTPATIENT
Start: 2025-05-13 | End: 2025-05-14

## 2025-05-13 RX ORDER — METHOCARBAMOL 100 MG/ML
1000 INJECTION, SOLUTION INTRAMUSCULAR; INTRAVENOUS ONCE
Status: COMPLETED | OUTPATIENT
Start: 2025-05-13 | End: 2025-05-13

## 2025-05-13 RX ORDER — ONDANSETRON 4 MG/1
4 TABLET, FILM COATED ORAL EVERY 12 HOURS PRN
Qty: 10 TABLET | Refills: 0 | Status: SHIPPED | OUTPATIENT
Start: 2025-05-13

## 2025-05-13 RX ORDER — ACETAMINOPHEN 500 MG
1000 TABLET ORAL EVERY 8 HOURS PRN
Qty: 90 TABLET | Refills: 0 | Status: SHIPPED | OUTPATIENT
Start: 2025-05-13

## 2025-05-13 RX ORDER — BUPIVACAINE HYDROCHLORIDE AND EPINEPHRINE 5; 5 MG/ML; UG/ML
INJECTION, SOLUTION EPIDURAL; INTRACAUDAL; PERINEURAL AS NEEDED
Status: DISCONTINUED | OUTPATIENT
Start: 2025-05-13 | End: 2025-05-13 | Stop reason: HOSPADM

## 2025-05-13 RX ORDER — ONDANSETRON 2 MG/ML
INJECTION INTRAMUSCULAR; INTRAVENOUS AS NEEDED
Status: DISCONTINUED | OUTPATIENT
Start: 2025-05-13 | End: 2025-05-13 | Stop reason: SURG

## 2025-05-13 RX ORDER — SENNOSIDES 8.6 MG
17.2 TABLET ORAL NIGHTLY PRN
Qty: 30 TABLET | Refills: 0 | Status: SHIPPED | OUTPATIENT
Start: 2025-05-13

## 2025-05-13 RX ORDER — PHENYLEPHRINE HCL 10 MG/ML
VIAL (ML) INJECTION AS NEEDED
Status: DISCONTINUED | OUTPATIENT
Start: 2025-05-13 | End: 2025-05-13 | Stop reason: SURG

## 2025-05-13 RX ORDER — HYDROCODONE BITARTRATE AND ACETAMINOPHEN 5; 325 MG/1; MG/1
2 TABLET ORAL ONCE AS NEEDED
Status: DISCONTINUED | OUTPATIENT
Start: 2025-05-13 | End: 2025-05-13 | Stop reason: HOSPADM

## 2025-05-13 RX ORDER — OXYBUTYNIN CHLORIDE 5 MG/1
5 TABLET, EXTENDED RELEASE ORAL DAILY
Status: DISCONTINUED | OUTPATIENT
Start: 2025-05-13 | End: 2025-05-14

## 2025-05-13 RX ORDER — LIDOCAINE HYDROCHLORIDE 10 MG/ML
INJECTION, SOLUTION EPIDURAL; INFILTRATION; INTRACAUDAL; PERINEURAL AS NEEDED
Status: DISCONTINUED | OUTPATIENT
Start: 2025-05-13 | End: 2025-05-13 | Stop reason: SURG

## 2025-05-13 RX ORDER — HYDROCODONE BITARTRATE AND ACETAMINOPHEN 5; 325 MG/1; MG/1
1 TABLET ORAL ONCE AS NEEDED
Status: DISCONTINUED | OUTPATIENT
Start: 2025-05-13 | End: 2025-05-13 | Stop reason: HOSPADM

## 2025-05-13 RX ORDER — DIAZEPAM 10 MG/2ML
5 INJECTION, SOLUTION INTRAMUSCULAR; INTRAVENOUS ONCE AS NEEDED
Status: DISCONTINUED | OUTPATIENT
Start: 2025-05-13 | End: 2025-05-13 | Stop reason: HOSPADM

## 2025-05-13 RX ORDER — ONDANSETRON 2 MG/ML
4 INJECTION INTRAMUSCULAR; INTRAVENOUS EVERY 6 HOURS PRN
Status: DISCONTINUED | OUTPATIENT
Start: 2025-05-13 | End: 2025-05-13 | Stop reason: HOSPADM

## 2025-05-13 RX ORDER — HYDROMORPHONE HYDROCHLORIDE 1 MG/ML
0.4 INJECTION, SOLUTION INTRAMUSCULAR; INTRAVENOUS; SUBCUTANEOUS EVERY 5 MIN PRN
Status: DISCONTINUED | OUTPATIENT
Start: 2025-05-13 | End: 2025-05-13 | Stop reason: HOSPADM

## 2025-05-13 RX ORDER — PROCHLORPERAZINE EDISYLATE 5 MG/ML
5 INJECTION INTRAMUSCULAR; INTRAVENOUS EVERY 8 HOURS PRN
Status: DISCONTINUED | OUTPATIENT
Start: 2025-05-13 | End: 2025-05-13 | Stop reason: HOSPADM

## 2025-05-13 RX ORDER — LEVOTHYROXINE SODIUM 125 UG/1
125 TABLET ORAL
Status: DISCONTINUED | OUTPATIENT
Start: 2025-05-13 | End: 2025-05-14

## 2025-05-13 RX ORDER — NALOXONE HYDROCHLORIDE 0.4 MG/ML
80 INJECTION, SOLUTION INTRAMUSCULAR; INTRAVENOUS; SUBCUTANEOUS AS NEEDED
Status: DISCONTINUED | OUTPATIENT
Start: 2025-05-13 | End: 2025-05-13 | Stop reason: HOSPADM

## 2025-05-13 RX ORDER — POLYETHYLENE GLYCOL 3350 17 G/17G
17 POWDER, FOR SOLUTION ORAL DAILY PRN
Status: DISCONTINUED | OUTPATIENT
Start: 2025-05-13 | End: 2025-05-14

## 2025-05-13 RX ORDER — SENNOSIDES 8.6 MG
17.2 TABLET ORAL NIGHTLY
Status: DISCONTINUED | OUTPATIENT
Start: 2025-05-13 | End: 2025-05-14

## 2025-05-13 RX ORDER — CYCLOBENZAPRINE HCL 5 MG
5 TABLET ORAL 3 TIMES DAILY PRN
Qty: 30 TABLET | Refills: 0 | Status: SHIPPED | OUTPATIENT
Start: 2025-05-13

## 2025-05-13 RX ORDER — ONDANSETRON 2 MG/ML
4 INJECTION INTRAMUSCULAR; INTRAVENOUS EVERY 6 HOURS PRN
Status: DISCONTINUED | OUTPATIENT
Start: 2025-05-13 | End: 2025-05-14

## 2025-05-13 RX ORDER — PROCHLORPERAZINE EDISYLATE 5 MG/ML
5 INJECTION INTRAMUSCULAR; INTRAVENOUS EVERY 8 HOURS PRN
Status: DISCONTINUED | OUTPATIENT
Start: 2025-05-13 | End: 2025-05-14

## 2025-05-13 RX ORDER — NICOTINE 21 MG/24HR
1 PATCH, TRANSDERMAL 24 HOURS TRANSDERMAL DAILY
Status: DISCONTINUED | OUTPATIENT
Start: 2025-05-13 | End: 2025-05-14

## 2025-05-13 RX ORDER — ACETAMINOPHEN 500 MG
1000 TABLET ORAL EVERY 8 HOURS SCHEDULED
Status: DISCONTINUED | OUTPATIENT
Start: 2025-05-13 | End: 2025-05-14

## 2025-05-13 RX ORDER — DIPHENHYDRAMINE HYDROCHLORIDE 50 MG/ML
25 INJECTION, SOLUTION INTRAMUSCULAR; INTRAVENOUS EVERY 4 HOURS PRN
Status: DISCONTINUED | OUTPATIENT
Start: 2025-05-13 | End: 2025-05-14

## 2025-05-13 RX ORDER — METHOCARBAMOL 100 MG/ML
INJECTION, SOLUTION INTRAMUSCULAR; INTRAVENOUS
Status: COMPLETED
Start: 2025-05-13 | End: 2025-05-13

## 2025-05-13 RX ORDER — ROCURONIUM BROMIDE 10 MG/ML
INJECTION, SOLUTION INTRAVENOUS AS NEEDED
Status: DISCONTINUED | OUTPATIENT
Start: 2025-05-13 | End: 2025-05-13 | Stop reason: SURG

## 2025-05-13 RX ORDER — HYDROMORPHONE HYDROCHLORIDE 1 MG/ML
0.6 INJECTION, SOLUTION INTRAMUSCULAR; INTRAVENOUS; SUBCUTANEOUS EVERY 5 MIN PRN
Status: DISCONTINUED | OUTPATIENT
Start: 2025-05-13 | End: 2025-05-13 | Stop reason: HOSPADM

## 2025-05-13 RX ORDER — NEOSTIGMINE METHYLSULFATE 1 MG/ML
INJECTION INTRAVENOUS AS NEEDED
Status: DISCONTINUED | OUTPATIENT
Start: 2025-05-13 | End: 2025-05-13 | Stop reason: SURG

## 2025-05-13 RX ORDER — SODIUM PHOSPHATE, DIBASIC AND SODIUM PHOSPHATE, MONOBASIC 7; 19 G/230ML; G/230ML
1 ENEMA RECTAL ONCE AS NEEDED
Status: DISCONTINUED | OUTPATIENT
Start: 2025-05-13 | End: 2025-05-14

## 2025-05-13 RX ORDER — MIDAZOLAM HYDROCHLORIDE 1 MG/ML
INJECTION INTRAMUSCULAR; INTRAVENOUS AS NEEDED
Status: DISCONTINUED | OUTPATIENT
Start: 2025-05-13 | End: 2025-05-13 | Stop reason: SURG

## 2025-05-13 RX ORDER — GLYCOPYRROLATE 0.2 MG/ML
INJECTION, SOLUTION INTRAMUSCULAR; INTRAVENOUS AS NEEDED
Status: DISCONTINUED | OUTPATIENT
Start: 2025-05-13 | End: 2025-05-13 | Stop reason: SURG

## 2025-05-13 RX ORDER — OXYCODONE HYDROCHLORIDE 5 MG/1
5 TABLET ORAL EVERY 4 HOURS PRN
Status: DISCONTINUED | OUTPATIENT
Start: 2025-05-13 | End: 2025-05-14

## 2025-05-13 RX ORDER — MEPERIDINE HYDROCHLORIDE 25 MG/ML
12.5 INJECTION INTRAMUSCULAR; INTRAVENOUS; SUBCUTANEOUS AS NEEDED
Status: DISCONTINUED | OUTPATIENT
Start: 2025-05-13 | End: 2025-05-13 | Stop reason: HOSPADM

## 2025-05-13 RX ORDER — DEXAMETHASONE SODIUM PHOSPHATE 4 MG/ML
VIAL (ML) INJECTION AS NEEDED
Status: DISCONTINUED | OUTPATIENT
Start: 2025-05-13 | End: 2025-05-13 | Stop reason: SURG

## 2025-05-13 RX ORDER — FAMOTIDINE 20 MG/1
20 TABLET, FILM COATED ORAL 2 TIMES DAILY
Status: DISCONTINUED | OUTPATIENT
Start: 2025-05-13 | End: 2025-05-14

## 2025-05-13 RX ORDER — OXYCODONE HYDROCHLORIDE 10 MG/1
10 TABLET ORAL EVERY 4 HOURS PRN
Status: DISCONTINUED | OUTPATIENT
Start: 2025-05-13 | End: 2025-05-14

## 2025-05-13 RX ORDER — DOCUSATE SODIUM 100 MG/1
100 CAPSULE, LIQUID FILLED ORAL 2 TIMES DAILY
Status: DISCONTINUED | OUTPATIENT
Start: 2025-05-13 | End: 2025-05-14

## 2025-05-13 RX ORDER — CYCLOBENZAPRINE HCL 5 MG
5 TABLET ORAL EVERY 6 HOURS PRN
Status: DISCONTINUED | OUTPATIENT
Start: 2025-05-13 | End: 2025-05-14

## 2025-05-13 RX ORDER — SODIUM CHLORIDE, SODIUM LACTATE, POTASSIUM CHLORIDE, CALCIUM CHLORIDE 600; 310; 30; 20 MG/100ML; MG/100ML; MG/100ML; MG/100ML
INJECTION, SOLUTION INTRAVENOUS CONTINUOUS
Status: DISCONTINUED | OUTPATIENT
Start: 2025-05-13 | End: 2025-05-13 | Stop reason: HOSPADM

## 2025-05-13 RX ORDER — BISACODYL 10 MG
10 SUPPOSITORY, RECTAL RECTAL
Status: DISCONTINUED | OUTPATIENT
Start: 2025-05-13 | End: 2025-05-14

## 2025-05-13 RX ORDER — DIPHENHYDRAMINE HYDROCHLORIDE 50 MG/ML
12.5 INJECTION, SOLUTION INTRAMUSCULAR; INTRAVENOUS AS NEEDED
Status: DISCONTINUED | OUTPATIENT
Start: 2025-05-13 | End: 2025-05-13 | Stop reason: HOSPADM

## 2025-05-13 RX ORDER — DIPHENHYDRAMINE HCL 25 MG
25 CAPSULE ORAL EVERY 4 HOURS PRN
Status: DISCONTINUED | OUTPATIENT
Start: 2025-05-13 | End: 2025-05-14

## 2025-05-13 RX ORDER — TRANEXAMIC ACID 10 MG/ML
INJECTION, SOLUTION INTRAVENOUS AS NEEDED
Status: DISCONTINUED | OUTPATIENT
Start: 2025-05-13 | End: 2025-05-13 | Stop reason: SURG

## 2025-05-13 RX ORDER — ACETAMINOPHEN 500 MG
1000 TABLET ORAL ONCE
Status: DISCONTINUED | OUTPATIENT
Start: 2025-05-13 | End: 2025-05-13 | Stop reason: HOSPADM

## 2025-05-13 RX ORDER — LABETALOL HYDROCHLORIDE 5 MG/ML
5 INJECTION, SOLUTION INTRAVENOUS EVERY 5 MIN PRN
Status: DISCONTINUED | OUTPATIENT
Start: 2025-05-13 | End: 2025-05-13 | Stop reason: HOSPADM

## 2025-05-13 RX ORDER — ACETAMINOPHEN 10 MG/ML
INJECTION, SOLUTION INTRAVENOUS
Status: COMPLETED
Start: 2025-05-13 | End: 2025-05-13

## 2025-05-13 RX ORDER — DIAZEPAM 5 MG/1
5 TABLET ORAL NIGHTLY PRN
Qty: 20 TABLET | Refills: 0 | Status: SHIPPED | OUTPATIENT
Start: 2025-05-13

## 2025-05-13 RX ORDER — DIAZEPAM 5 MG/1
5 TABLET ORAL EVERY 6 HOURS PRN
Status: DISCONTINUED | OUTPATIENT
Start: 2025-05-13 | End: 2025-05-14

## 2025-05-13 RX ORDER — HYDROMORPHONE HYDROCHLORIDE 1 MG/ML
0.4 INJECTION, SOLUTION INTRAMUSCULAR; INTRAVENOUS; SUBCUTANEOUS EVERY 2 HOUR PRN
Status: DISCONTINUED | OUTPATIENT
Start: 2025-05-13 | End: 2025-05-14

## 2025-05-13 RX ADMIN — SODIUM CHLORIDE, SODIUM LACTATE, POTASSIUM CHLORIDE, CALCIUM CHLORIDE: 600; 310; 30; 20 INJECTION, SOLUTION INTRAVENOUS at 07:33:00

## 2025-05-13 RX ADMIN — MIDAZOLAM HYDROCHLORIDE 2 MG: 1 INJECTION INTRAMUSCULAR; INTRAVENOUS at 07:35:00

## 2025-05-13 RX ADMIN — ROCURONIUM BROMIDE 30 MG: 10 INJECTION, SOLUTION INTRAVENOUS at 08:25:00

## 2025-05-13 RX ADMIN — TRANEXAMIC ACID 1000 MG: 10 INJECTION, SOLUTION INTRAVENOUS at 07:44:00

## 2025-05-13 RX ADMIN — ROCURONIUM BROMIDE 20 MG: 10 INJECTION, SOLUTION INTRAVENOUS at 07:42:00

## 2025-05-13 RX ADMIN — PHENYLEPHRINE HCL 100 MCG: 10 MG/ML VIAL (ML) INJECTION at 08:06:00

## 2025-05-13 RX ADMIN — PHENYLEPHRINE HCL 50 MCG: 10 MG/ML VIAL (ML) INJECTION at 08:04:00

## 2025-05-13 RX ADMIN — SODIUM CHLORIDE, SODIUM LACTATE, POTASSIUM CHLORIDE, CALCIUM CHLORIDE: 600; 310; 30; 20 INJECTION, SOLUTION INTRAVENOUS at 07:50:00

## 2025-05-13 RX ADMIN — LIDOCAINE HYDROCHLORIDE 100 MG: 10 INJECTION, SOLUTION EPIDURAL; INFILTRATION; INTRACAUDAL; PERINEURAL at 07:42:00

## 2025-05-13 RX ADMIN — SODIUM CHLORIDE, SODIUM LACTATE, POTASSIUM CHLORIDE, CALCIUM CHLORIDE: 600; 310; 30; 20 INJECTION, SOLUTION INTRAVENOUS at 08:52:00

## 2025-05-13 RX ADMIN — ONDANSETRON 4 MG: 2 INJECTION INTRAMUSCULAR; INTRAVENOUS at 10:36:00

## 2025-05-13 RX ADMIN — SODIUM CHLORIDE, SODIUM LACTATE, POTASSIUM CHLORIDE, CALCIUM CHLORIDE: 600; 310; 30; 20 INJECTION, SOLUTION INTRAVENOUS at 09:29:00

## 2025-05-13 RX ADMIN — GLYCOPYRROLATE 0.8 MG: 0.2 INJECTION, SOLUTION INTRAMUSCULAR; INTRAVENOUS at 10:47:00

## 2025-05-13 RX ADMIN — PHENYLEPHRINE HCL 100 MCG: 10 MG/ML VIAL (ML) INJECTION at 08:20:00

## 2025-05-13 RX ADMIN — DEXAMETHASONE SODIUM PHOSPHATE 8 MG: 4 MG/ML VIAL (ML) INJECTION at 10:36:00

## 2025-05-13 RX ADMIN — NEOSTIGMINE METHYLSULFATE 5 MG: 1 INJECTION INTRAVENOUS at 10:47:00

## 2025-05-13 NOTE — CONSULTS
[place] HOSPITALIST  CONSULT     Carmelo Covington Patient Status:  Outpatient in a Bed    1970 MRN [id number]   Location [facility] 3SW-A Attending Christian Matias MD   Hosp Day # 0 PCP Rui Garcia DO     Reason for consult: Medical management    Requested by: Dr. Matias    Subjective:   History of Present Illness:     Carmelo Covington is a 54 year old male with history of GERD, hypothyroidism, anxiety, obesity (BMI 39), cervical myeloradiculopathy 2/p ACDF. Vapes nicotine daily.    History/Other:    Past Medical History:  Past Medical History:    Allergic rhinitis    Anxiety    Back problem    Carpal tunnel syndrome on both sides    Esophageal reflux    Herniated cervical disc    Hypothyroidism    Morbid obesity with BMI of 40.0-44.9, adult (HCC)    Visual impairment    glasses     Past Surgical History:   Past Surgical History[1]   Family History:   Family History[2]  Social History:    reports that he quit smoking about 3 years ago. His smoking use included cigarettes. He started smoking about 6 years ago. He has a 3 pack-year smoking history. He has never used smokeless tobacco. He reports current alcohol use. He reports that he does not use drugs.     Allergies: Allergies[3]    Medications:  Medications Ordered Prior to Encounter[4]    Review of Systems:   A comprehensive review of systems was completed.    Pertinent positives and negatives noted in the HPI.    Objective:   Physical Exam:    /71 (BP Location: Right arm)   Pulse 86   Temp 98.4 °F (36.9 °C) (Oral)   Resp 17   Ht 5' 9\" (1.753 m)   Wt 265 lb (120.2 kg)   SpO2 95%   BMI 39.13 kg/m²   General: No acute distress, Alert  Respiratory: No rhonchi, no wheezes  Cardiovascular: S1, S2. Regular rate and rhythm  Abdomen: Soft, NT/ND, +BS  Neuro: No new focal deficits  Extremities: No edema    Results:    Labs:      Labs Last 24 Hours:  No results for input(s): \"WBC\", \"HGB\", \"MCV\", \"PLT\", \"BAND\", \"INR\" in the last 168 hours.    Invalid input(s):  \"LYM#\", \"MONO#\", \"BASOS#\", \"EOSIN#\"    No results for input(s): \"GLU\", \"BUN\", \"CREATSERUM\", \"GFRAA\", \"GFRNAA\", \"CA\", \"ALB\", \"NA\", \"K\", \"CL\", \"CO2\", \"ALKPHO\", \"AST\", \"ALT\", \"BILT\", \"TP\" in the last 168 hours.    No results for input(s): \"PTP\", \"INR\" in the last 168 hours.    No results for input(s): \"TROP\", \"CK\" in the last 168 hours.      Imaging: Imaging data reviewed in Epic.    Assessment & Plan:      #Cervical myeloradiculopathy s/p ACDF    #Nicotine use disorder - nicotine patch   #GERD  #Hypothyroidism   #Anxiety  #obesity, BMI 39    Plan of care discussed with patient    Anastasiya Askew MD  5/13/2025    The 21st Century Cures Act makes medical notes like these available to patients in the interest of transparency. Please be advised this is a medical document. Medical documents are intended to carry relevant information, facts as evident, and the clinical opinion of the practitioner. The medical note is intended as peer to peer communication and may appear blunt or direct. It is written in medical language and may contain abbreviations or verbiage that are unfamiliar.            [1]   Past Surgical History:  Procedure Laterality Date    Other surgical history  01/01/2009    Carpal tunnel right hand    Tonsillectomy     [2]   Family History  Problem Relation Age of Onset    Cancer Father     Cancer Maternal Grandmother     Cancer Maternal Grandfather     Heart Disease Neg     Stroke Neg    [3] No Known Allergies  [4]   No current facility-administered medications on file prior to encounter.     Current Outpatient Medications on File Prior to Encounter   Medication Sig Dispense Refill    amphetamine-dextroamphetamine ER (ADDERALL XR) 30 MG Oral Capsule SR 24 Hr Take 1 capsule (30 mg total) by mouth every morning. 90 capsule 0    famotidine 20 MG Oral Tab Take 1 tablet (20 mg total) by mouth 2 (two) times daily. 60 tablet 3    citalopram 40 MG Oral Tab Take 1 tablet (40 mg total) by mouth daily. 90 tablet 3     oxybutynin ER 5 MG Oral Tablet 24 Hr Take 1 tablet (5 mg total) by mouth in the morning.      levothyroxine 125 MCG Oral Tab Take 1 tablet (125 mcg total) by mouth before breakfast. 90 tablet 3    tamsulosin 0.4 MG Oral Cap Take 1 capsule (0.4 mg total) by mouth in the morning.      naproxen 500 MG Oral Tab Take 1 tablet (500 mg total) by mouth 2 (two) times daily with meals. 60 tablet 3

## 2025-05-13 NOTE — ANESTHESIA PREPROCEDURE EVALUATION
PRE-OP EVALUATION    Patient Name: Carmelo Covington    Admit Diagnosis: Myeloradiculopathy [G54.9]    Pre-op Diagnosis: Myeloradiculopathy [G54.9]    CERVICAL 5-CERVICAL 6, CERVICAL 6-CERVICAL 7 ANTERIOR CERVICAL DISCECTOMY AND FUSION    Anesthesia Procedure: CERVICAL 5-CERVICAL 6, CERVICAL 6-CERVICAL 7 ANTERIOR CERVICAL DISCECTOMY AND FUSION (Spine Cervical)  INTRAOPERATIVE NEURO MONITORING    Surgeons and Role:     * Christian Mtaias MD - Primary    Pre-op vitals reviewed.  Temp: 97.4 °F (36.3 °C)  Pulse: 69  Resp: 16  BP: 135/84  SpO2: 97 %  Body mass index is 39.13 kg/m².    Current medications reviewed.  Hospital Medications:  Current Medications[1]    Outpatient Medications:   Prescriptions Prior to Admission[2]    Allergies: Patient has no known allergies.      Anesthesia Evaluation    Patient summary reviewed.    Anesthetic Complications  (-) history of anesthetic complications         GI/Hepatic/Renal      (+) GERD and well controlled      (+) chronic renal disease (elevated serum creatinine)                    Cardiovascular        Exercise tolerance: good     MET: >4    (+) obesity                         (-) angina     (-) GIFFORD  (-) orthopnea  (-) PND     Endo/Other           (+) hypothyroidism    (-) anemia        (-) thrombocytopenia           Pulmonary                    (+) sleep apnea and no treatment      Neuro/Psych                 (+) neuromuscular disease                     Past Surgical History[3]  Social Hx on file[4]  History   Drug Use Unknown     Available pre-op labs reviewed.  Lab Results   Component Value Date    WBC 7.3 04/28/2025    RBC 5.25 04/28/2025    HGB 15.5 04/28/2025    HCT 45.3 04/28/2025    MCV 86.3 04/28/2025    MCH 29.5 04/28/2025    MCHC 34.2 04/28/2025    RDW 13.0 04/28/2025    .0 04/28/2025     Lab Results   Component Value Date     04/28/2025    K 4.3 04/28/2025     04/28/2025    CO2 28.0 04/28/2025    BUN 12 04/28/2025    CREATSERUM 1.35 (H) 04/28/2025    GLU  102 (H) 04/28/2025    CA 9.9 04/28/2025     Lab Results   Component Value Date    INR 1.08 04/28/2025         Airway      Mallampati: II  Mouth opening: >3 FB  TM distance: 4 - 6 cm  Neck ROM: full Cardiovascular    Cardiovascular exam normal.  Rhythm: regular  Rate: normal  (-) murmur   Dental    Dentition appears grossly intact         Pulmonary    Pulmonary exam normal.  Breath sounds clear to auscultation bilaterally.        (-) wheezes       Other findings              ASA: 2   Plan: general  NPO status verified and patient meets guidelines.          Plan/risks discussed with: patient  Use of blood product(s) discussed with: patient    Consented to blood products.        We discussed GA w/ETT and possible scratchy throat and rarely dental damage.  We discussed analgesic plan and PONV prophylaxis.  The patient's questions were answered and consent was attained.            [1]    [Transfer Hold] acetaminophen (Tylenol Extra Strength) tab 1,000 mg  1,000 mg Oral Once    lactated ringers infusion   Intravenous Continuous    [COMPLETED] ceFAZolin (Ancef) 2g in 10mL IV syringe premix  2 g Intravenous Once    bupivacaine 0.5%-EPINEPHrine 1:200,000 PF (Marcaine/Epinephrine PF) injection    PRN   [2]   Medications Prior to Admission   Medication Sig Dispense Refill Last Dose/Taking    amphetamine-dextroamphetamine ER (ADDERALL XR) 30 MG Oral Capsule SR 24 Hr Take 1 capsule (30 mg total) by mouth every morning. 90 capsule 0 5/11/2025    famotidine 20 MG Oral Tab Take 1 tablet (20 mg total) by mouth 2 (two) times daily. 60 tablet 3 5/12/2025 at  8:00 AM    citalopram 40 MG Oral Tab Take 1 tablet (40 mg total) by mouth daily. 90 tablet 3 5/12/2025 at  8:00 AM    oxybutynin ER 5 MG Oral Tablet 24 Hr Take 1 tablet (5 mg total) by mouth in the morning.   5/12/2025 at  8:00 AM    levothyroxine 125 MCG Oral Tab Take 1 tablet (125 mcg total) by mouth before breakfast. 90 tablet 3 5/12/2025 at  8:00 AM    tamsulosin 0.4 MG Oral  Cap Take 1 capsule (0.4 mg total) by mouth in the morning.   5/12/2025 at  8:00 AM    naproxen 500 MG Oral Tab Take 1 tablet (500 mg total) by mouth 2 (two) times daily with meals. 60 tablet 3 Past Month    [DISCONTINUED] mupirocin 2 % External Ointment Apply 1 Application topically 2 (two) times daily. Apply to both nostrils using a cotton swab, 5-7 days prior to surgery 15 g 0 5/12/2025   [3]   Past Surgical History:  Procedure Laterality Date    Other surgical history  01/01/2009    Carpal tunnel right hand    Tonsillectomy     [4]   Social History  Socioeconomic History    Marital status:    Tobacco Use    Smoking status: Former     Current packs/day: 0.00     Average packs/day: 1 pack/day for 3.0 years (3.0 ttl pk-yrs)     Types: Cigarettes     Start date: 11/25/2018     Quit date: 11/25/2021     Years since quitting: 3.4    Smokeless tobacco: Never   Vaping Use    Vaping status: Every Day    Substances: Nicotine    Devices: Disposable   Substance and Sexual Activity    Alcohol use: Yes     Comment: 1-2 times per week    Drug use: Never   Other Topics Concern    Weight Concern Yes     Comment: cant loss weight    Caffeine Concern No    Exercise No

## 2025-05-13 NOTE — CM/SW NOTE
05/13/25 1200   CM/SW Referral Data   Referral Source Social Work (self-referral)   Reason for Referral Discharge planning   Informant EMR;Clinical Staff Member   Discharge Needs   Anticipated D/C needs Home health care       Patient is a 53 y/o man admitted s/p ACDF.  Pt with pre-operative plan for [facility]. Referral sent to [facility] via [facility]. Await confirmation of acceptance and post op therapy evals for further DC planning. / to remain available for support and/or discharge planning.     Jolly Moss, [facility]  Discharge Planner  [phone]

## 2025-05-13 NOTE — SPIRITUAL CARE NOTE
Spiritual Care Visit Note    Patient Name: Carmelo Covington Date of Spiritual Care Visit: 25   : 1970 Primary Dx: <principal problem not specified>       Referred By:      Spiritual Care Taxonomy:    Intended Effects: Build relationship of care and support    Methods: Offer support    Interventions: Active listening, Ask guided questions, Explain  role    Visit Type/Summary:     - Spiritual Care:  visited with patient while rounding. Patient did not have a need for spiritual care support. He did confirm that he will be admitted.  gave card with call back information. Patient thanked  for support.   remains available as needed for follow up.    Spiritual Care support can be requested via an Epic consult. For urgent/immediate needs, please contact the On Call  at: [facility]: ext [phone]    Min. Ann Joseph Mdiv.

## 2025-05-13 NOTE — BRIEF OP NOTE
Pre-Operative Diagnosis: Myeloradiculopathy [G54.9]     Post-Operative Diagnosis: Myeloradiculopathy [G54.9]      Procedure Performed:   CERVICAL 5-CERVICAL 6, CERVICAL 6-CERVICAL 7 ANTERIOR CERVICAL DISCECTOMY AND FUSION    Surgeons and Role:     * Christian Matias MD - Primary    Assistant(s):  ELAIENN: Deidra Zhong APRN     Surgical Findings: cervical stenosis     Specimen: none     Estimated Blood Loss: Blood Output: 10 mL (5/13/2025 10:39 AM)      Plan    Antibiotics: Cefazolin x 24 hours  Anticoagulation: SCDs and early ambulation  Drain: None  Activity: Out of bed with assistance  Brace:  soft collar with activity, okay to remove for meals and hygiene   Medical history, allergies, and medications:In EPIC  Pain control: Routine postop  Anticipated discharge: When pain control and activity tolerance allow. Should be 1-2 days  Consults: PT. Hospitalist  Other information: None    Xray: POD1 standing Cervical spine AP/LAT okay for collar off        DIMAS Holden  5/13/2025  10:54 AM

## 2025-05-13 NOTE — PLAN OF CARE
POD 0 ACDF C5-C7, soft collar for 6 weeks, Pt is AAOX4, VSS, 2L O2 post-op, voiding freely to urinal, gauze dressing remains CDI, soft collar in place, MSG left for [facility] for appropriate sized collar, plan for PT / OT to eval tomorrow, 2 person skin check completed, no issues found. Pt doing well, all needs met, all safety measures in place, call light within reach, will CTM.    Update: [facility] did not have soft collars in stock, [place] ordered.   General

## 2025-05-13 NOTE — ANESTHESIA PROCEDURE NOTES
Airway  Date/Time: 5/13/2025 7:42 AM  Reason: elective    Airway not difficult    General Information and Staff   Patient location during procedure: OR  Anesthesiologist: Ar Goodwin MD  Performed: anesthesiologist   Performed by: Ar Goodwin MD  Authorized by: Ar Goodwin MD        Indications and Patient Condition  Indications for airway management: anesthesia  Sedation level: deep      Preoxygenated: yesPatient position: sniffing    Mask difficulty assessment: 0 - not attempted    Final Airway Details    Final airway type: endotracheal airway    Successful airway: ETT  Cuffed: yes   Successful intubation technique: Video laryngoscopy  Facilitating devices/methods: intubating stylet  Endotracheal tube insertion site: oral  Blade: GlideScope  Blade size: #3  ETT size (mm): 7.5    Cormack-Lehane Classification: grade I - full view of glottis  Placement verified by: capnometry   Cuff volume (mL): 8  Measured from: lips  ETT to lips (cm): 23  Number of attempts at approach: 1  Number of other approaches attempted: 0    Additional Comments  Bite block placed

## 2025-05-13 NOTE — ANESTHESIA POSTPROCEDURE EVALUATION
[facility]    Carmelo Covington Patient Status:  Outpatient in a Bed   Age/Gender 54 year old male MRN [id number]   Location [facility] SURGERY Attending Christian Matias MD   Hosp Day # 0 PCP Rui Garcia DO       Anesthesia Post-op Note    CERVICAL 5-CERVICAL 6, CERVICAL 6-CERVICAL 7 ANTERIOR CERVICAL DISCECTOMY AND FUSION    Procedure Summary       Date: 05/13/25 Room / Location:  MAIN OR  /  MAIN OR    Anesthesia Start: 0733 Anesthesia Stop: 1101    Procedures:       CERVICAL 5-CERVICAL 6, CERVICAL 6-CERVICAL 7 ANTERIOR CERVICAL DISCECTOMY AND FUSION (Spine Cervical)      INTRAOPERATIVE NEURO MONITORING Diagnosis:       Myeloradiculopathy      (Myeloradiculopathy [G54.9])    Surgeons: Christian Matias MD Anesthesiologist: Ar Goodwin MD    Anesthesia Type: general ASA Status: 2            Anesthesia Type: general    Vitals Value Taken Time   /73 05/13/25 12:00   Temp 97.4 °F (36.3 °C) 05/13/25 11:50   Pulse 92 05/13/25 11:58   Resp 17 05/13/25 11:58   SpO2 97 % 05/13/25 12:03   Vitals shown include unfiled device data.        Patient Location: PACU    Anesthesia Type: general    Airway Patency: patent and extubated    Postop Pain Control: adequate    Mental Status: mildly sedated but able to meaningfully participate in the post-anesthesia evaluation    Nausea/Vomiting: none    Cardiopulmonary/Hydration status: stable euvolemic    Complications: no apparent anesthesia related complications    Postop vital signs: stable    Comments: Moves extremities well prior to extubation in OR    Patient to be discharged from PACU when criteria met.

## 2025-05-13 NOTE — OPERATIVE REPORT
SURGEON: Christian Matias MD     PREOPERATIVE DIAGNOSIS:   Cervical myeloradiculopathy     POSTOPERATIVE DIAGNOSIS:   Cervical myeloradiculopathy     OPERATION DATE: 5/13/2025     NAME OF OPERATION:  1. C5-7 anterior cervical discectomy and fusion (57491, 73507)  2. C5-7 interbody graft placement.(22853x2)  3. C5-7 anterior instrumentation with cervical plate and screws (11775)       ANESTHESIA: General endotracheal.      ESTIMATED BLOOD LOSS: 10 mL.      DISPOSITION: Stable, extubated to PACU.      INDICATIONS: This patient is a 54 year old-year-old male with a long-standing history of cervical radiculopathy as well as balance difficulties. He had several months of hand numbness and symptoms that failed nonoperative management. They elected surgical treatment. We discussed the risks and benefits of surgery, as well as the risks and benefits of nonoperative treatment. We discussed that the risks of surgery specifically included infection, dural tear, persistent pain, numbness and weakness, nerve root palsy, dysphagia, neck pain, nonunion, adjacent segment disease, loss of fixation and need for future surgeries. All questions were answered. Informed consent was obtained.         DESCRIPTION OF PROCEDURE: The patient was taken the operating room where the anesthesiology service induced satisfactory general anesthesia. A first-generation cephalosporin was given within 1/2 hour of the surgical incision. Venous thromboembolic prophylaxis was performed with sequential devices. The patient was positioned supine on the operating room table. All bony prominences were well padded, paying careful attention to the arms, ulnar nerves, and epicondyles. The anterior neck was then prepped and draped in its entirety in the usual sterile fashion with alcohol and ChloroPrep.      We took a transverse approach and made a skin incision based on anatomic landmarks. The platysma was incised with electrocautery transversely. The subplatysmal  level was then carefully mobilized with DeBakey pickups and Metzenbaum scissors. A standard Wetzel-Lundy approach was used between the omohyoid and sternocleidomastoid muscle. The omohyoid muscle was preserved. The carotid pulse was palpated lateral to the approach. The precervical fascia was then opened with bipolar electrocautery and bluntly dissected. The anterior cervical spine and longus coli muscles were well visualized. C-arm fluoroscopy was used for radiographic localization. Vertebral body was marked with a spinal needle. Lateral fluoroscopic view was taken and levels were confirmed. The coli were carefully elevated at the appropriate levels with bipolar.     I placed Columbia pins in the body of C6 and C7, where I performed an anterior annulotomy with a 15 blade and pituitary. Small and medium cervical Howard were utilized to distract the disk space. Columbia retractor was used to maintain distraction. The remainder of the C6-7 disk was removed with straight and curved curettes. The endplates were cleared with curettes and the cartilage was removed. The inferior lip of the C6 body was removed with #3 Kerrison anteriorly, and the bone was saved for bone graft. The disk space was well visualized, uncus to uncus, as well as the PLL. A 3 mm bur was utilized over the PLL to remove the osteophytes and improve visualization of the PLL. This also prepared the endplates both cephalad and caudally. A curved curette and nerve hook were utilized behind the body and rotated under the PLL to create a plane. A #2 Kerrison was then utilized to take down the PLL and complete the foraminotomies. The posterior osteophytes were also removed cephalad and caudally with #2 kerrisons. I performed foraminotomies with #2 Kerrison. I utilized a nerve hook to palpate the exiting nerve roots and was happy with the decompression bilaterally, as well as the decompression centrally. The disk space was trialed. A 7 mm height graft was  chosen. The final graft was then placed under lateral fluoroscopy. Similarly discectomy was performed at C5-6 and a 7 mm graft was again chosen.      At this point, I was happy with the decompression and the sizing of the graft, and then prepared for the plate and screw fixation. A separate plate was used for independent stabilization. I used a double-barrel drill guide and a 16mm drill to drill the screw paths in the C5 body under lateral fluoroscopy. Based on the length of the drill, I chose appropriate screw length. I then removed the drill and went ahead and placed 2 screws in the C5 body through the cervical plate. I was happy with the position of the plate and the cephalad screws. I then similarly placed two screws into the body of C6 and C7, after drilling their paths with the double-barrel drill guide. At this point, I obtained AP and lateral fluoroscopy and confirmed appropriate position and placement and length of the screws and plate. I was happy with the position of the spine and implants.     I re-explored the wound to make sure there was no significant bleeding. There was excellent hemostasis. I inspected the esophagus and found no signs of injury. I inspected the coli and surgical bed and found no significant bleeding. I then thoroughly irrigated the wound with 1 L of saline irrigation. I then placed some of the local vertebral body autograft that was removed and placed it in the interbody spaces next the graft. Once I was happy with the wound and had achieved meticulous hemostasis, we then proceeded with closure, after all counts were correct. The incision was then routinely closed over multiple layers of interrupted 2-0 Vicryl sutures; 3-0 Monocryl sutures were used in the deep dermal layer, and glue and steri-strips were placed over the incision. Sterile dressings were applied. The patient was then transferred to the hospital bed and taken to the recovery room after extubation in stable  condition.         I attest I was present for and performed all key and critical aspects of the procedure.    The aid of assistant Deidra Zhong was needed for patient positioning, preparation, drape, retraction,  wound closure, dressing application and critical portions of the procedure.

## 2025-05-14 ENCOUNTER — APPOINTMENT (OUTPATIENT)
Dept: GENERAL RADIOLOGY | Facility: HOSPITAL | Age: 55
End: 2025-05-14
Attending: NURSE PRACTITIONER
Payer: COMMERCIAL

## 2025-05-14 VITALS
RESPIRATION RATE: 18 BRPM | WEIGHT: 265 LBS | SYSTOLIC BLOOD PRESSURE: 139 MMHG | HEART RATE: 80 BPM | DIASTOLIC BLOOD PRESSURE: 87 MMHG | HEIGHT: 69 IN | BODY MASS INDEX: 39.25 KG/M2 | OXYGEN SATURATION: 93 % | TEMPERATURE: 98 F

## 2025-05-14 PROBLEM — E66.01 MORBID OBESITY (HCC): Status: ACTIVE | Noted: 2025-05-14

## 2025-05-14 PROBLEM — N32.89 BLADDER SPASM: Status: ACTIVE | Noted: 2025-05-14

## 2025-05-14 LAB
ANION GAP SERPL CALC-SCNC: 8 MMOL/L (ref 0–18)
BUN BLD-MCNC: 8 MG/DL (ref 9–23)
CALCIUM BLD-MCNC: 9.4 MG/DL (ref 8.7–10.6)
CHLORIDE SERPL-SCNC: 104 MMOL/L (ref 98–112)
CO2 SERPL-SCNC: 26 MMOL/L (ref 21–32)
CREAT BLD-MCNC: 0.97 MG/DL (ref 0.7–1.3)
EGFRCR SERPLBLD CKD-EPI 2021: 94 ML/MIN/1.73M2 (ref 60–?)
ERYTHROCYTE [DISTWIDTH] IN BLOOD BY AUTOMATED COUNT: 13.1 %
GLUCOSE BLD-MCNC: 131 MG/DL (ref 70–99)
HCT VFR BLD AUTO: 41.5 % (ref 39–53)
HGB BLD-MCNC: 14.2 G/DL (ref 13–17.5)
MCH RBC QN AUTO: 29.3 PG (ref 26–34)
MCHC RBC AUTO-ENTMCNC: 34.2 G/DL (ref 31–37)
MCV RBC AUTO: 85.7 FL (ref 80–100)
OSMOLALITY SERPL CALC.SUM OF ELEC: 286 MOSM/KG (ref 275–295)
PLATELET # BLD AUTO: 221 10(3)UL (ref 150–450)
POTASSIUM SERPL-SCNC: 4.2 MMOL/L (ref 3.5–5.1)
RBC # BLD AUTO: 4.84 X10(6)UL (ref 4.3–5.7)
RH BLOOD TYPE: POSITIVE
SODIUM SERPL-SCNC: 138 MMOL/L (ref 136–145)
WBC # BLD AUTO: 12.2 X10(3) UL (ref 4–11)

## 2025-05-14 PROCEDURE — 72040 X-RAY EXAM NECK SPINE 2-3 VW: CPT | Performed by: NURSE PRACTITIONER

## 2025-05-14 PROCEDURE — 99214 OFFICE O/P EST MOD 30 MIN: CPT | Performed by: STUDENT IN AN ORGANIZED HEALTH CARE EDUCATION/TRAINING PROGRAM

## 2025-05-14 NOTE — PROGRESS NOTES
[facility]   part of [facility]     Hospitalist Progress Note     Carmelo Covington Patient Status:  Outpatient in a Bed    1970 MRN [id number]   Location [facility] 3SW-A Attending Christian Matias MD   Hosp Day # 0 PCP Rui Garcia DO     Chief Complaint: Medical management    Subjective:      - AF; VSS    - pain controlled with current regimen    - Denies new f/c, cp, sob, abd pain, n/v   - Tolerating diet, working with PT     Objective:    Review of Systems:   A comprehensive review of systems was completed; pertinent positive and negatives stated in subjective.    Vital signs:  Temp:  [97.4 °F (36.3 °C)-98.4 °F (36.9 °C)] 98.4 °F (36.9 °C)  Pulse:  [] 89  Resp:  [9-18] 18  BP: (115-153)/() 146/81  SpO2:  [93 %-100 %] 94 %    Physical Exam:    General: No acute distress  HEENT: Anterior cervical incision with dressing c/d/I, C-collar in place  Respiratory: no wheezes, no rhonchi  Cardiovascular: S1, S2, regular rate and rhythm  Abdomen: Soft, Non-tender, non-distended, positive bowel sounds  Neuro: No new focal deficits.   Extremities: no edema    Diagnostic Data:    Labs:  Recent Labs   Lab 25  0436   WBC 12.2*   HGB 14.2   MCV 85.7   .0       Recent Labs   Lab 25  0436   *   BUN 8*   CREATSERUM 0.97   CA 9.4      K 4.2      CO2 26.0       Estimated Creatinine Clearance: 89.1 mL/min (based on SCr of 0.97 mg/dL).    No results for input(s): \"TROP\", \"TROPHS\", \"CK\" in the last 168 hours.    No results for input(s): \"PTP\", \"INR\" in the last 168 hours.               Microbiology    No results found for this visit on 25.      Imaging: Reviewed in Epic.    Medications: Scheduled Medications[1]    Assessment & Plan:      #Cervical myeloradiculopathy s/p ACDF   - Primary management per neurosurgery   - PT/OT    - Pain control     #Nicotine use disorder - nicotine patch   # Hypothyroidism - continue home levothyroxine   # GERD - continue  famotidine  #Anxiety  #obesity, BMI 39    Dispo planning when cleared by surgery, PT/OT    John Rangel MD    Supplementary Documentation:     Quality:  DVT Mechanical Prophylaxis: SIMRAN hose, MIKEs,    DVT Pharmacologic Prophylaxis   Medication   None                Code Status: Not on file  Ghosh: No urinary catheter in place  Ghosh Duration (in days):   Central line:    DAREK: 5/14/2025    The 21st Century Cures Act makes medical notes like these available to patients in the interest of transparency. Please be advised this is a medical document. Medical documents are intended to carry relevant information, facts as evident, and the clinical opinion of the practitioner. The medical note is intended as peer to peer communication and may appear blunt or direct. It is written in medical language and may contain abbreviations or verbiage that are unfamiliar.                       [1]    levothyroxine  125 mcg Oral Before breakfast    famotidine  20 mg Oral BID    oxybutynin ER  5 mg Oral Daily    tamsulosin  0.4 mg Oral Daily    sennosides  17.2 mg Oral Nightly    docusate sodium  100 mg Oral BID    acetaminophen  1,000 mg Oral Q8H COLBY    escitalopram  20 mg Oral Daily    nicotine  1 patch Transdermal Daily

## 2025-05-14 NOTE — PLAN OF CARE
Alert and Oriented x4. On RA. VSS. Tele-NS. Dressing to anterior neck in place; C/D/I; Moderate Pain controlled by PRN medications, see MAR for actions. Some tingling to LUE, improvement since postop. Voiding freely. Tolerating diet. Denies N/V. Call light within reach at this time.    Plan: PT/OT eval, Collar on at all times, pain management

## 2025-05-14 NOTE — PLAN OF CARE
POD 1 ACDF C5-C7, soft collar for 6 weeks, Miami-J in place, Pt is AAOX4, VSS, room air, voiding freely to urinal or restroom, gauze dressing remains CDI, PT / OT following, will CTM.

## 2025-05-14 NOTE — PLAN OF CARE
NURSING DISCHARGE NOTE    Discharged Home via Wheelchair.  Accompanied by Spouse  Belongings Taken by patient/family.    AVS printed and discussed, IV removed, Rx's delivered to Pt's room. Pt ready to discharge home.

## 2025-05-14 NOTE — CM/SW NOTE
Met with patient and provided AIDIN information for [facility].  Pt agreeable with DC plan.  No further DC needs/concerns identified at this time.  SW available should additional discharge needs arise.    Jolly Moss [facility]  Discharge Planner  [phone]

## 2025-05-14 NOTE — OCCUPATIONAL THERAPY NOTE
OCCUPATIONAL THERAPY EVALUATION - INPATIENT    Room Number: 379/379-A  Evaluation Date: 5/14/2025     Type of Evaluation: Initial  Presenting Problem: s/p CERVICAL 5-CERVICAL 6, CERVICAL 6-CERVICAL 7 ANTERIOR CERVICAL DISCECTOMY AND FUSION 5/13 Dr. Matias    Physician Order: IP Consult to Occupational Therapy  Reason for Therapy:  ADL/IADL Dysfunction and Discharge Planning    OCCUPATIONAL THERAPY ASSESSMENT   Patient is a 54 year old male admitted on 5/13/2025 with Presenting Problem: s/p CERVICAL 5-CERVICAL 6, CERVICAL 6-CERVICAL 7 ANTERIOR CERVICAL DISCECTOMY AND FUSION 5/13 Dr. Matias. Co-Morbidities : GERD, hypothyroidism, anxiety  Patient is currently functioning near baseline with toileting, upper body dressing, lower body dressing, grooming, brace management, bed mobility, transfers, static sitting balance, dynamic sitting balance, static standing balance, dynamic standing balance, maintaining seated position, functional standing tolerance, energy conservation strategies, and aerobic capacity.  Prior to admission, patient's baseline is IND with ADLs/IADLs.  Patient met all OT goals at supervision-Manny level.  Patient reports no further questions/concerns at this time.     Patient will be discharged from acute inpatient OT services at this time.    Recommendations for nursing staff:   Transfers: up x 1 assist, supervision  Toileting location: Toilet    EVALUATION SESSION:  Patient at start of session: semi supine in bed    FUNCTIONAL TRANSFER ASSESSMENT  Sit to Stand: Edge of Bed  Edge of Bed: Modified Independent  Toilet Transfer: Supervision    BED MOBILITY  Supine to Sit : Modified Independent  Sit to Supine (OT): Not Tested  Scooting: Supervision    BALANCE ASSESSMENT  Static Sitting: Supervision  Static Standing: Supervision    FUNCTIONAL ADL ASSESSMENT  Grooming Standing: Supervision (brushed teeth)  UB Dressing Standing: Supervision (donned shirt + Miami J collar)  LB Dressing Seated: Supervision (donned  brief + shorts)  LB Dressing Standing: Supervision  Toileting Seated: Supervision    ACTIVITY TOLERANCE: WFL                         O2 SATURATIONS       COGNITION  Overall Cognitive Status:  WFL - within functional limits    COGNITION ASSESSMENTS     Upper Extremity:   ROM: within functional limits  Strength: is within functional limits    EDUCATION PROVIDED  Patient Education : Role of Occupational Therapy; Plan of Care; Discharge Recommendations; DME Recommendations; Functional Transfer Techniques; Fall Prevention; Surgical Precautions; Posture/Positioning; Proper Body Mechanics; Energy Conservation  Patient's Response to Education: Verbalized Understanding; Returned Demonstration    Equipment used: none  Demonstrates functional use    Therapist comments: RN cleared pt for session, received semi supine in bed, miami-J collar in place. Pt educated on spinal precautions to follow during Adls in recovery; pt verbalized understanding, demonstrated good active recall of precautions throughout activity. Pt educated on proper fit/wearing schedule of cervical collar, pt demonstrated understanding of donning/doffing collar during activity. Pt states his family can assist as needed during recovery; also educated on use of AE/DME to utilize during recovery to promote precautions and safety during ADL, pt verbalized understanding and in agreement.      Patient End of Session: Up in chair, Needs met, Call light within reach, RN aware of session/findings, All patient questions and concerns addressed, Hospital anti-slip socks    OCCUPATIONAL PROFILE    HOME SITUATION  Type of Home: House  Home Layout: Two level, Bed/bath upstairs, Able to live on main level  Lives With: Spouse, Daughter, Son    Toilet and Equipment: Standard height toilet  Shower/Tub and Equipment: Walk-in shower  Other Equipment: Long-handled shoehorn, Long-handled sponge    Occupation/Status: Full time- for [employer]  Hand Dominance:  Left  Drives: Yes  Patient Regularly Uses: Glasses    Prior Level of Function:     SUBJECTIVE  \"Thank you!\"    PAIN ASSESSMENT  Rating: 3  Location: upper neck region  Management Techniques: Repositioning, Relaxation, Breathing techniques    OBJECTIVE  Precautions: Spine, Cervical brace (Miami-J)  Fall Risk: Standard fall risk    WEIGHT BEARING RESTRICTION       AM-PAC ‘6-Clicks’ Inpatient Daily Activity Short Form  -   Putting on and taking off regular lower body clothing?: None  -   Bathing (including washing, rinsing, drying)?: None  -   Toileting, which includes using toilet, bedpan or urinal? : None  -   Putting on and taking off regular upper body clothing?: None  -   Taking care of personal grooming such as brushing teeth?: None  -   Eating meals?: None    AM-PAC Score:  Score: 24  Approx Degree of Impairment: 0%  Standardized Score (AM-PAC Scale): 57.54    ADDITIONAL TESTS     NEUROLOGICAL FINDINGS      PLAN   Patient has been evaluated and presents with no skilled Occupational Therapy needs at this time.  Patient discharged from Occupational Therapy services.  Please re-order if a new functional limitation presents during this admission.    OT Device Recommendations: Reacher, Shower chair    Patient Evaluation Complexity Level:   Occupational Profile/Medical History LOW - Brief history including review of medical or therapy records    Specific performance deficits impacting engagement in ADL/IADL LOW  1 - 3 performance deficits    Client Assessment/Performance Deficits LOW - No comorbidities nor modifications of tasks    Clinical Decision Making LOW - Analysis of occupational profile, problem-focused assessments, limited treatment options    Overall Complexity LOW     OT Session Time: 20 minutes  Self-Care Home Management: 12 minutes

## 2025-05-14 NOTE — PROGRESS NOTES
[facility]  Progress Note    Carmelo Covington Patient Status:  Outpatient in a Bed    1970 MRN [id number]   Location [facility] 3SW-A Attending Christian Matias MD   Hosp Day # 0 PCP Rui Garcia,        SUBJECTIVE     POD#: 1     Subjective: Patient is doing well s/p surgery. Pain controlled with current medications. Reports continues prior to surgery left shoulder area pain, arms with no current concerns. Pt reports incisional pain without radiation. Pt denies new onset numbness, tingling, or paresthesias. Denies chest pain, SOB, nausea, vomiting, calf pain.     Urinating well  BM: Flatus.   Cervical collar in place and well fitting, feels improvement of pain with the collar in place.     OBJECTIVE     Vitals:   Blood pressure 139/87, pulse 80, temperature 98.3 °F (36.8 °C), temperature source Oral, resp. rate 18, height 5' 9\" (1.753 m), weight 265 lb (120.2 kg), SpO2 93%.    Pain Assessment  Pain Assessment Tool: 0-10  Pasero Sedation Scale: Awake and alert  0-10 Scale: \"Mild\"  Pain Type: Acute pain, Surgical pain  Pain Location: Neck  Pain Orientation: Upper  Pain Descriptors: Aching  Pain Frequency: Constant/continuous  Patient's Stated Pain Goal: 3  Pain Intervention(s): Declined    Exam:   GENERAL APPEARANCE: Alert & oriented x 4; no acute distress.     PULM: respirations unlabored on O2 via RA  HEENT: Pupils are equal, round. No abnormal miosis. No ptosis  CV: cap refill < 2 seconds, extremities WWP x4    EXTREMITIES:   Right Upper Extremity:  Deltoid/Biceps 5/5  Wrist Extension 5/5  Tricep and Wrist Flexion 5/5  Finger Flexion 5/5  Finger Abduction 5/5  SILT     Left Upper Extremity:  Deltoid/Biceps 5/5  Wrist Extension 5/5  Tricep 5/5 and Wrist Flexion 5/5  Finger Flexion 5/5  Finger Abduction 5/5  SILT     Calves soft supple, nontender.      INCISION: Cervical spine C/D/I-        MEDICATIONS     Reviewed in EPIC      ASSESSMENT AND PLAN        Carmelo Covington is a 54 year old male POD#1 s/p C5-C7  ACDF .     Plan     Antibiotics: Cefazolin x 24 hours  Anticoagulation: SCDs and early ambulation  Drain: None  Activity: Out of bed with assistance  Brace: soft collar with activity, okay to remove for meals and hygiene   Medical history, allergies, and medications:In EPIC  Pain control: Routine postop  Anticipated discharge: When pain control and activity tolerance allow. Should be Today  Consults: PT. Hospitalist  Other information: None     Xray: POD1 standing Cervical spine AP/LAT okay for collar off     At this point Mr. Covington  is expected to be discharged to:  Home with Home health services        DIMAS Holden for Dr. Matias

## 2025-05-14 NOTE — PHYSICAL THERAPY NOTE
PHYSICAL THERAPY EVALUATION - INPATIENT     Room Number: 379/379-A  Evaluation Date: 5/14/2025  Type of Evaluation: Initial  Physician Order: PT Eval and Treat    Presenting Problem: Myeloradiculoapthy s/p C5-7 ACDF 5/13  Co-Morbidities : Sleep apnea  Reason for Therapy: Mobility Dysfunction and Discharge Planning    PHYSICAL THERAPY ASSESSMENT   Patient is a 54 year old male admitted 5/13/2025 for Myeloradiculoapthy s/p C5-7 ACDF 5/13.   Patient is currently functioning near baseline with bed mobility, transfers, and gait. Prior to admission, patient's baseline is independent.     Given the patient is functioning near baseline level do not anticipate skilled therapy needs at discharge .    PLAN  Patient has been evaluated and presents with no skilled Physical Therapy needs at this time.  Patient discharged from Physical Therapy services.  Please re-order if a new functional limitation presents during this admission.         GOALS  Patient was able to achieve the following goals ...    Patient was able to transfer Safely and independently   Patient able to ambulate on level surfaces Safely and independently     HOME SITUATION  Type of Home: House  Home Layout: Two level           Stairs to Bedroom: 15    Railing: Yes    Lives With: Spouse, Daughter    Drives: Yes   Patient Regularly Uses: Glasses     Prior Level of [place]: Pt typically indep with ADLs and mobility.     SUBJECTIVE  \"I feel alright\"     OBJECTIVE  Precautions: Spine, Cervical brace  Fall Risk: Standard fall risk    WEIGHT BEARING RESTRICTION     PAIN ASSESSMENT  Rating: Unable to rate  Location: neck  Management Techniques: Activity promotion, Body mechanics    COGNITION  Overall Cognitive Status:  WFL - within functional limits    RANGE OF MOTION AND STRENGTH ASSESSMENT  See OT note for UE assessment    Lower extremity ROM is within functional limits      Lower extremity strength is within functional limits     BALANCE  Static Sitting:  Normal  Dynamic Sitting: Normal  Static Standing: Good  Dynamic Standing: Good    ADDITIONAL TESTS                                    ACTIVITY TOLERANCE                         O2 WALK       NEUROLOGICAL FINDINGS                        AM-PAC '6-Clicks' INPATIENT SHORT FORM - BASIC MOBILITY  How much difficulty does the patient currently have...  Patient Difficulty: Turning over in bed (including adjusting bedclothes, sheets and blankets)?: None   Patient Difficulty: Sitting down on and standing up from a chair with arms (e.g., wheelchair, bedside commode, etc.): None   Patient Difficulty: Moving from lying on back to sitting on the side of the bed?: None   How much help from another person does the patient currently need...   Help from Another: Moving to and from a bed to a chair (including a wheelchair)?: None   Help from Another: Need to walk in hospital room?: None   Help from Another: Climbing 3-5 steps with a railing?: None       AM-PAC Score:  Raw Score: 24   Approx Degree of Impairment: 0%   Standardized Score (AM-PAC Scale): 61.14   CMS Modifier (G-Code): CH    FUNCTIONAL ABILITY STATUS  Gait Assessment   Functional Mobility/Gait Assessment  Gait Assistance: Independent  Distance (ft): 300  Assistive Device: None  Pattern: Within Functional Limits  Stairs: Stairs, Car transfer  How Many Stairs: 4  Device: 1 Rail  Assist: Independent  Pattern: Ascend and Descend  Ascend and Descend : Reciprocal  Car transfer: x1 rep, indep    Skilled Therapy Provided     Bed Mobility:  Rolling: NT  Supine to sit: NT   Sit to supine: NT     Transfer Mobility:  Sit to stand: ind   Stand to sit: ind  Gait = ind    Therapist's comments:RN cleared for session. Pt agreeable for therapy, received up in chair. Reviewed spine precautions. Trained on stairs and car transfer. Instructed to call for nursing staff for any needs and OOB mobility.      Exercise/Education Provided:  Bed mobility  Body mechanics  Energy  conservation  Functional activity tolerated  Gait training  Posture  Strengthening  Transfer training    Patient End of Session: Up in chair, Needs met, RN aware of session/findings, Call light within reach, All patient questions and concerns addressed, Hospital anti-slip socks, Discussed recommendations with /    Patient Evaluation Complexity Level:  History High - 3 or more personal factors and/or co-morbidities   Examination of body systems Low -  addressing 1-2 elements   Clinical Presentation Low- Stable   Clinical Decision Making Low Complexity       PT Session Time: 20 minutes  Gait Training: 10 minutes  Therapeutic Activity: 5 minutes

## 2025-05-16 ENCOUNTER — TELEPHONE (OUTPATIENT)
Facility: CLINIC | Age: 55
End: 2025-05-16

## 2025-05-16 DIAGNOSIS — F90.0 ATTENTION DEFICIT HYPERACTIVITY DISORDER (ADHD), PREDOMINANTLY INATTENTIVE TYPE: ICD-10-CM

## 2025-05-16 RX ORDER — DEXTROAMPHETAMINE SACCHARATE, AMPHETAMINE ASPARTATE MONOHYDRATE, DEXTROAMPHETAMINE SULFATE AND AMPHETAMINE SULFATE 7.5; 7.5; 7.5; 7.5 MG/1; MG/1; MG/1; MG/1
30 CAPSULE, EXTENDED RELEASE ORAL EVERY MORNING
Qty: 30 CAPSULE | Refills: 0 | Status: SHIPPED | OUTPATIENT
Start: 2025-05-16 | End: 2025-08-14

## 2025-05-16 NOTE — TELEPHONE ENCOUNTER
C5-7 ACDF, SX: 05.13.25  Pt states 3/10 incision site pain with movement    HHN: ARRIVED YESTERDAY  Therapy: ARRIVED YESTERDAY                 Pt states appetite is good and his throat is okay.                              Incision: Denies incision site concerns. Will follow dressing changes per guidelines. Pt states that incision is without redness, swelling and drainage.  Pt removed bandage today and showered - no leakage    Pain Meds: only taking Tylenol.     Denies nausea/vomiting/fevers/constipation.   Denies any new numbness, tingling or weakness in lower extremities/upper extremities.      Pt states he is getting electric shocks the size of a quarter in the lt shoulder - keep an eye on.    Pt states that they are up walking. Encouraged 4 short walks in the home daily, increase as tolerated.     Confirmed post op appt    5/30/2025 Deidra Zhong APRN

## 2025-05-16 NOTE — TELEPHONE ENCOUNTER
[id number] - [place], [place] - [address]  AT [facility] [address], [phone], [phone]   [address] [facility] [address]   Phone: [phone] Fax: [phone]   Hours: Not open 24 hours       PATIENT REQUESTING REFILL FOR     amphetamine-dextroamphetamine ER (ADDERALL XR) 30 MG Oral Capsule SR 24 Hr 90 capsule 0 3/14/2025 6/12/2025    Sig - Route: Take 1 capsule (30 mg total) by mouth every morning. - Oral    Sent to pharmacy as: Amphetamine-Dextroamphet ER 30 MG Oral Capsule Extended Release 24 Hour (Adderall XR)

## 2025-05-19 ENCOUNTER — MED REC SCAN ONLY (OUTPATIENT)
Facility: CLINIC | Age: 55
End: 2025-05-19

## 2025-05-20 RX ORDER — FAMOTIDINE 20 MG/1
20 TABLET, FILM COATED ORAL DAILY
Qty: 90 TABLET | Refills: 3 | Status: SHIPPED | OUTPATIENT
Start: 2025-05-20

## 2025-05-20 NOTE — TELEPHONE ENCOUNTER
Famotidine 20 MG Oral Tablet   Pt failed refill protocol for the following reasons:    Gastrointestional Medication Protocol [id number]/20/2025 04:50 AM   Protocol Details Medication is active on med list    In person appointment or virtual visit in the past 12 mos or appointment in next 3 mos      Last refill: 2/10/2025, for #60, 3 refill  Last appt: 5/5/2025  Next appt:   Future Appointments   Date Time Provider Department Center   5/30/2025  8:30 AM Deidra Zhong APRN [facility] [address]     Forward to Dr. Rui Garcia, please advise on refills. Thank you.

## 2025-05-30 ENCOUNTER — OFFICE VISIT (OUTPATIENT)
Facility: CLINIC | Age: 55
End: 2025-05-30
Payer: COMMERCIAL

## 2025-05-30 VITALS — WEIGHT: 265 LBS | HEIGHT: 69 IN | BODY MASS INDEX: 39.25 KG/M2

## 2025-05-30 DIAGNOSIS — Z98.1 ARTHRODESIS STATUS: Primary | ICD-10-CM

## 2025-05-30 PROCEDURE — 99024 POSTOP FOLLOW-UP VISIT: CPT | Performed by: NURSE PRACTITIONER

## 2025-05-30 NOTE — PROGRESS NOTES
Post op follow up    CC:   Chief Complaint   Patient presents with    Neck Pain     *PO C5-7 ACDF; DOS: 5/13/25  Pt and hh has come to the hows pain much better        HPI:     Carmelo Covington is a 54 year old male with chief complaint of   Chief Complaint   Patient presents with    Neck Pain     *PO C5-7 ACDF; DOS: 5/13/25  Pt and hh has come to the hows pain much better        Post op: 5/13/2025 C5-C7 ACDF     Overall doing well post surgical, left arm pain is resolved. Has some soreness in the upper trap area.     VAS: 1/10    Incision: itching  Bone stimulator: ordered  Brace: none  Taking: OTC pain medications PRN    Denies CHEST PAIN/SOB/calf pain. Denies fever/chills/nausea/vomiting.     Old records, summarized above in HPI, which were reviewed:  none    Current Medications:  Current Medications[1]   HISTORY:  Past Medical History[2]   Past Surgical History[3]   Family History[4]   Short Social Hx on File[5]     Exam     A&Ox4 in no acute distress. Seated on exam chair    Non Antalgic gait, able to normal heel and toe walk. Incision is well approximated without drainage, swelling or erythema.Steri strips removed, patient tolerated well.  Strength is 5/5 bilateral in the upper extremities. Sensation is intact to light touch to the bilateral upper extremities. Denies calf pain. No edema in the lower extremities. Dorsalis pedis pulses are intact bilaterally.     Post op imaging reviewed    Medical Decision Making:   Impression:   1. Arthrodesis status  - DME - EXTERNAL       Plan:  Overall progressing well post surgically.   Bone stimulator: ordered.   Activity: Continue spine precautions. Increase ambulation as tolerated. To transition to outpatient physical therapy once discharged from home health physical therapy, pending next 4 weeks of HEP. Reviewed gentle ROM of the cervical spine with no excessive motion for next 4 weeks.   Incision: Okay to shower normal in 24 hours. Wait until full healing for submersion,  usually in next 1-2 weeks. Okay for over the counter scar creams as needed. Reviewed gentle scar mobilization.  Medications: Continue OTC pain medications as needed.    Follow up in 4 weeks for next routine post operative follow up.   Xray: needs routine 6 week post operative imaging at follow up,       Review treatment plan with the patient.  Return in about 4 weeks (around 6/27/2025), or if symptoms worsen or fail to improve.    Patient educated and verbalized understanding.  The patient indicates understanding of these issues and agrees to the plan.    Deidra Zhong, FNP-BC, RNFA  Collaborative: Christian Matias MD  Orthopedic Spine Surgeon  [facility]   [address] [place] [address]   t: [phone]   f: [phone]              [1]   Current Outpatient Medications   Medication Sig Dispense Refill    FAMOTIDINE 20 MG Oral Tab TAKE 1 TABLET DAILY 90 tablet 3    amphetamine-dextroamphetamine ER (ADDERALL XR) 30 MG Oral Capsule SR 24 Hr Take 1 capsule (30 mg total) by mouth every morning. 30 capsule 0    acetaminophen 500 MG Oral Tab Take 2 tablets (1,000 mg total) by mouth every 8 (eight) hours as needed for Pain. 90 tablet 0    citalopram 40 MG Oral Tab Take 1 tablet (40 mg total) by mouth daily. 90 tablet 3    oxybutynin ER 5 MG Oral Tablet 24 Hr Take 1 tablet (5 mg total) by mouth in the morning.      levothyroxine 125 MCG Oral Tab Take 1 tablet (125 mcg total) by mouth before breakfast. 90 tablet 3    tamsulosin 0.4 MG Oral Cap Take 1 capsule (0.4 mg total) by mouth in the morning.     [2]   Past Medical History:   Allergic rhinitis    Anxiety    Back problem    Carpal tunnel syndrome on both sides    Esophageal reflux    H/O cervical spine surgery    C5-7 ACDF    Herniated cervical disc    Hypothyroidism    Morbid obesity with BMI of 40.0-44.9, adult (HCC)    Visual impairment    glasses   [3]   Past Surgical History:  Procedure  Laterality Date    Other surgical history  01/01/2009    Carpal tunnel right hand    Tonsillectomy     [4]   Family History  Problem Relation Age of Onset    Cancer Father     Cancer Maternal Grandmother     Cancer Maternal Grandfather     Heart Disease Neg     Stroke Neg    [5]   Social History  Socioeconomic History    Marital status:    Tobacco Use    Smoking status: Former     Current packs/day: 0.00     Average packs/day: 1 pack/day for 3.0 years (3.0 ttl pk-yrs)     Types: Cigarettes     Start date: 11/25/2018     Quit date: 11/25/2021     Years since quitting: 3.5    Smokeless tobacco: Never   Vaping Use    Vaping status: Every Day    Substances: Nicotine    Devices: Disposable   Substance and Sexual Activity    Alcohol use: Yes     Comment: 1-2 times per week    Drug use: Never   Other Topics Concern    Weight Concern Yes     Comment: cant loss weight    Caffeine Concern No    Exercise No     Social Drivers of Health     Food Insecurity: No Food Insecurity (5/13/2025)    NCSS - Food Insecurity     Worried About Running Out of Food in the Last Year: No     Ran Out of Food in the Last Year: No   Transportation Needs: No Transportation Needs (5/13/2025)    NCSS - Transportation     Lack of Transportation: No   Housing Stability: Not At Risk (5/13/2025)    NCSS - Housing/Utilities     Has Housing: Yes     Worried About Losing Housing: No     Unable to Get Utilities: No

## 2025-06-02 ENCOUNTER — OFFICE VISIT (OUTPATIENT)
Dept: FAMILY MEDICINE CLINIC | Facility: CLINIC | Age: 55
End: 2025-06-02
Payer: COMMERCIAL

## 2025-06-02 VITALS
OXYGEN SATURATION: 98 % | SYSTOLIC BLOOD PRESSURE: 132 MMHG | WEIGHT: 256.13 LBS | BODY MASS INDEX: 38 KG/M2 | DIASTOLIC BLOOD PRESSURE: 86 MMHG | HEART RATE: 114 BPM | TEMPERATURE: 98 F | RESPIRATION RATE: 16 BRPM

## 2025-06-02 DIAGNOSIS — D17.23 BENIGN LIPOMATOUS NEOPLASM OF SKIN AND SUBCUTANEOUS TISSUE OF RIGHT LEG: ICD-10-CM

## 2025-06-02 DIAGNOSIS — R22.41 MASS OF RIGHT LOWER LEG: Primary | ICD-10-CM

## 2025-06-02 PROCEDURE — 99213 OFFICE O/P EST LOW 20 MIN: CPT | Performed by: FAMILY MEDICINE

## 2025-06-02 NOTE — PROGRESS NOTES
Carmelo Covington is a 54 year old male.  HPI:   Carmelo is here for evaluation of a bump on the outer aspect of his right thigh, it was smaller at first but thinks its grown over the the course of the past few months and not feels like it is bruised where before it was nontender.  No trauma, no bruisiing  Current Outpatient Medications   Medication Sig Dispense Refill    REXTOVY 4 MG/0.25ML Nasal Liquid use one spray (4mg) by Nasal route as needed. If patient remains unresponsive, repeat dose in other nostril 2-5 minutes after first dose.      FAMOTIDINE 20 MG Oral Tab TAKE 1 TABLET DAILY 90 tablet 3    amphetamine-dextroamphetamine ER (ADDERALL XR) 30 MG Oral Capsule SR 24 Hr Take 1 capsule (30 mg total) by mouth every morning. 30 capsule 0    acetaminophen 500 MG Oral Tab Take 2 tablets (1,000 mg total) by mouth every 8 (eight) hours as needed for Pain. 90 tablet 0    citalopram 40 MG Oral Tab Take 1 tablet (40 mg total) by mouth daily. 90 tablet 3    oxybutynin ER 5 MG Oral Tablet 24 Hr Take 1 tablet (5 mg total) by mouth in the morning.      levothyroxine 125 MCG Oral Tab Take 1 tablet (125 mcg total) by mouth before breakfast. 90 tablet 3    tamsulosin 0.4 MG Oral Cap Take 1 capsule (0.4 mg total) by mouth in the morning.        Past Medical History:    Allergic rhinitis    Anxiety    Back problem    Carpal tunnel syndrome on both sides    Esophageal reflux    H/O cervical spine surgery    C5-7 ACDF    Herniated cervical disc    Hypothyroidism    Morbid obesity with BMI of 40.0-44.9, adult (HCC)    Visual impairment    glasses      Social History:  Social History     Socioeconomic History    Marital status:    Tobacco Use    Smoking status: Former     Current packs/day: 0.00     Average packs/day: 1 pack/day for 3.0 years (3.0 ttl pk-yrs)     Types: Cigarettes     Start date: 11/25/2018     Quit date: 11/25/2021     Years since quitting: 3.5    Smokeless tobacco: Never   Vaping Use    Vaping status: Every Day     Substances: Nicotine    Devices: Disposable   Substance and Sexual Activity    Alcohol use: Yes     Comment: 1-2 times per week    Drug use: Never   Other Topics Concern    Weight Concern Yes     Comment: cant loss weight    Caffeine Concern No    Exercise No     Social Drivers of Health     Food Insecurity: No Food Insecurity (5/13/2025)    NCSS - Food Insecurity     Worried About Running Out of Food in the Last Year: No     Ran Out of Food in the Last Year: No   Transportation Needs: No Transportation Needs (5/13/2025)    NCSS - Transportation     Lack of Transportation: No   Housing Stability: Not At Risk (5/13/2025)    NCSS - Housing/Utilities     Has Housing: Yes     Worried About Losing Housing: No     Unable to Get Utilities: No        REVIEW OF SYSTEMS:   GENERAL HEALTH: feels well otherwise  SKIN: denies any unusual skin lesions or rashes  RESPIRATORY: denies shortness of breath with exertion  CARDIOVASCULAR: denies chest pain on exertion  GI: denies abdominal pain and denies heartburn  NEURO: denies headaches  EXT: mass right thigh  EXAM:   /86   Pulse 114   Temp 97.8 °F (36.6 °C) (Temporal)   Resp 16   Wt 256 lb 2 oz (116.2 kg)   SpO2 98%   BMI 37.82 kg/m²   GENERAL: well developed, well nourished,in no apparent distress  SKIN: no rashes,no suspicious lesions  EXTREMITIES: no cyanosis, clubbing or edema, there is a well circumscribed mass over the lateral aspect of the right quadricep. Approximately 10 cm long by 7 cm wide, firm at the top, softer at the bottom    ASSESSMENT AND PLAN:     Encounter Diagnoses   Name Primary?    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      Meds & Refills for this Visit:  Requested Prescriptions      No prescriptions requested or ordered in this encounter       Imaging & Consults:  SURGERY - INTERNAL  US LEG RIGHT LIMITED  (CPT=76882)     The patient indicates understanding of these issues and agrees to the plan.  The patient is asked to return in after we get results back.

## 2025-06-12 ENCOUNTER — HOSPITAL ENCOUNTER (OUTPATIENT)
Dept: ULTRASOUND IMAGING | Facility: HOSPITAL | Age: 55
Discharge: HOME | End: 2025-06-12
Attending: FAMILY MEDICINE
Payer: COMMERCIAL

## 2025-06-12 ENCOUNTER — PATIENT MESSAGE (OUTPATIENT)
Dept: FAMILY MEDICINE CLINIC | Facility: CLINIC | Age: 55
End: 2025-06-12

## 2025-06-12 DIAGNOSIS — R22.41 MASS OF RIGHT LOWER LEG: ICD-10-CM

## 2025-06-12 PROCEDURE — 76882 US LMTD JT/FCL EVL NVASC XTR: CPT | Performed by: FAMILY MEDICINE

## 2025-06-18 DIAGNOSIS — F90.0 ATTENTION DEFICIT HYPERACTIVITY DISORDER (ADHD), PREDOMINANTLY INATTENTIVE TYPE: ICD-10-CM

## 2025-06-18 RX ORDER — DEXTROAMPHETAMINE SACCHARATE, AMPHETAMINE ASPARTATE MONOHYDRATE, DEXTROAMPHETAMINE SULFATE AND AMPHETAMINE SULFATE 7.5; 7.5; 7.5; 7.5 MG/1; MG/1; MG/1; MG/1
30 CAPSULE, EXTENDED RELEASE ORAL EVERY MORNING
Qty: 30 CAPSULE | Refills: 0 | Status: SHIPPED | OUTPATIENT
Start: 2025-06-18 | End: 2025-09-16

## 2025-06-18 NOTE — TELEPHONE ENCOUNTER
PT CALLED AND ADV NEEDS REFILL OF     amphetamine-dextroamphetamine ER (ADDERALL XR) 30 MG Oral Capsule SR 24 Hr     VEE GU       THANK YOU

## 2025-06-18 NOTE — TELEPHONE ENCOUNTER
Refill request-    amphetamine-dextroamphetamine ER (ADDERALL XR) 30 MG Oral Capsule SR 24 Hr # 30 caps 0 refills    Last fill 5/16/25  Last OV 6/2/25

## 2025-06-18 NOTE — TELEPHONE ENCOUNTER
Controlled Substance Medication [id number]/18/2025 12:24 PM    This medication is a controlled substance - forward to provider to refill    Medication is active on med list      Routing to provider per protocol.   amphetamine-dextroamphetamine ER (ADDERALL XR) 30 MG Oral Capsule SR 24 Hr   Last refilled on 5/16/25 for #30  with 0 rf.   Last labs 5/14/25.   Last seen on 6/2/25.       Future Appointments   Date Time Provider Department Center   6/24/2025  2:00 PM Kiki Short PA-C [id number]          Thank you.

## 2025-06-26 ENCOUNTER — OFFICE VISIT (OUTPATIENT)
Dept: ORTHOPEDICS CLINIC | Facility: CLINIC | Age: 55
End: 2025-06-26
Payer: COMMERCIAL

## 2025-06-26 ENCOUNTER — HOSPITAL ENCOUNTER (OUTPATIENT)
Dept: GENERAL RADIOLOGY | Age: 55
Discharge: HOME | End: 2025-06-26
Attending: STUDENT IN AN ORGANIZED HEALTH CARE EDUCATION/TRAINING PROGRAM
Payer: COMMERCIAL

## 2025-06-26 DIAGNOSIS — Z98.1 ARTHRODESIS STATUS: Primary | ICD-10-CM

## 2025-06-26 DIAGNOSIS — Z98.1 ARTHRODESIS STATUS: ICD-10-CM

## 2025-06-26 PROCEDURE — 99024 POSTOP FOLLOW-UP VISIT: CPT | Performed by: STUDENT IN AN ORGANIZED HEALTH CARE EDUCATION/TRAINING PROGRAM

## 2025-06-26 PROCEDURE — 72040 X-RAY EXAM NECK SPINE 2-3 VW: CPT | Performed by: STUDENT IN AN ORGANIZED HEALTH CARE EDUCATION/TRAINING PROGRAM

## 2025-06-27 NOTE — PROGRESS NOTES
6 weeks status post C5-7 ACDF    DOS:     Patient is now 6 weeks out from C5-7 ACDF and doing well.  Pre-operative arm paresthesia resolved.  This has been sustained. No dysphasia or dystonia.  He is no longer taking any narcotic pain medication.  Denies any issues with the wound or constitutional symptoms.    Physical examination    Cervical spine demonstrates a healed surgical incision. Bilateral upper extremity exam demonstrates 5 out of 5 strength in the  deltoids, biceps, triceps, wrist extensors, interosseus and intrinsics bilaterally.  Sensation is intact to light touch C5-T1 distributions bilaterally.     Imaging    XR demonstrates intact hardware    Assessment and Plan    6 weeks status post ACDF doing well.  Improvement in preoperative radiculitis and radiculopathy. Wound is healed. No narcotic pain medication requirement.   -Continue weight lifting restriction  -Encouraged ambulation  -Follow-up in 6 weeks for repeat evaluation and cervical XR (AP/Lateral/Flex/Ext)    Christian Matias MD  Orthopedic Spine Surgeon  [facility]   [address] [place] [address].org  [email or web address]  t: [phone]   f: [phone]        This note was dictated using Dragon software.  While it was briefly proofread prior to completion, some grammatical, spelling, and word choice errors due to dictation may still occur.

## 2025-07-01 ENCOUNTER — MED REC SCAN ONLY (OUTPATIENT)
Dept: ORTHOPEDICS CLINIC | Facility: CLINIC | Age: 55
End: 2025-07-01

## 2025-07-08 ENCOUNTER — TELEPHONE (OUTPATIENT)
Facility: CLINIC | Age: 55
End: 2025-07-08

## 2025-07-22 DIAGNOSIS — F90.0 ATTENTION DEFICIT HYPERACTIVITY DISORDER (ADHD), PREDOMINANTLY INATTENTIVE TYPE: ICD-10-CM

## 2025-07-22 RX ORDER — DEXTROAMPHETAMINE SACCHARATE, AMPHETAMINE ASPARTATE MONOHYDRATE, DEXTROAMPHETAMINE SULFATE AND AMPHETAMINE SULFATE 7.5; 7.5; 7.5; 7.5 MG/1; MG/1; MG/1; MG/1
30 CAPSULE, EXTENDED RELEASE ORAL EVERY MORNING
Qty: 30 CAPSULE | Refills: 0 | Status: SHIPPED | OUTPATIENT
Start: 2025-07-22 | End: 2025-10-20

## 2025-07-23 DIAGNOSIS — M54.2 NECK PAIN: Primary | ICD-10-CM

## 2025-07-23 DIAGNOSIS — Z98.1 ARTHRODESIS STATUS: ICD-10-CM

## 2025-08-07 ENCOUNTER — OFFICE VISIT (OUTPATIENT)
Dept: ORTHOPEDICS CLINIC | Facility: CLINIC | Age: 55
End: 2025-08-07

## 2025-08-07 ENCOUNTER — HOSPITAL ENCOUNTER (OUTPATIENT)
Dept: GENERAL RADIOLOGY | Age: 55
Discharge: HOME | End: 2025-08-07
Attending: STUDENT IN AN ORGANIZED HEALTH CARE EDUCATION/TRAINING PROGRAM

## 2025-08-07 VITALS — HEIGHT: 69 IN | WEIGHT: 256.13 LBS | BODY MASS INDEX: 37.94 KG/M2

## 2025-08-07 DIAGNOSIS — Z98.1 ARTHRODESIS STATUS: ICD-10-CM

## 2025-08-07 DIAGNOSIS — Z98.1 ARTHRODESIS STATUS: Primary | ICD-10-CM

## 2025-08-07 DIAGNOSIS — M54.2 NECK PAIN: ICD-10-CM

## 2025-08-07 PROCEDURE — 72050 X-RAY EXAM NECK SPINE 4/5VWS: CPT | Performed by: STUDENT IN AN ORGANIZED HEALTH CARE EDUCATION/TRAINING PROGRAM

## 2025-08-07 PROCEDURE — 99024 POSTOP FOLLOW-UP VISIT: CPT | Performed by: STUDENT IN AN ORGANIZED HEALTH CARE EDUCATION/TRAINING PROGRAM

## 2025-08-07 RX ORDER — OXYBUTYNIN CHLORIDE 10 MG/1
10 TABLET, EXTENDED RELEASE ORAL DAILY
COMMUNITY
Start: 2025-06-27

## 2025-08-15 ENCOUNTER — APPOINTMENT (OUTPATIENT)
Dept: GENERAL RADIOLOGY | Age: 55
End: 2025-08-15
Attending: NURSE PRACTITIONER

## 2025-08-15 ENCOUNTER — HOSPITAL ENCOUNTER (OUTPATIENT)
Age: 55
Discharge: HOME | End: 2025-08-15

## 2025-08-15 VITALS
DIASTOLIC BLOOD PRESSURE: 88 MMHG | HEIGHT: 69 IN | TEMPERATURE: 98 F | HEART RATE: 80 BPM | RESPIRATION RATE: 18 BRPM | WEIGHT: 255 LBS | BODY MASS INDEX: 37.77 KG/M2 | SYSTOLIC BLOOD PRESSURE: 136 MMHG | OXYGEN SATURATION: 96 %

## 2025-08-15 DIAGNOSIS — M72.2 PLANTAR FASCIITIS: Primary | ICD-10-CM

## 2025-08-15 PROCEDURE — 99213 OFFICE O/P EST LOW 20 MIN: CPT | Performed by: NURSE PRACTITIONER

## 2025-08-15 PROCEDURE — 73630 X-RAY EXAM OF FOOT: CPT | Performed by: NURSE PRACTITIONER

## (undated) DIAGNOSIS — E78.2 ELEVATED TRIGLYCERIDES WITH HIGH CHOLESTEROL: Primary | ICD-10-CM

## (undated) DIAGNOSIS — Z00.00 ROUTINE HEALTH MAINTENANCE: Primary | ICD-10-CM

## (undated) DIAGNOSIS — E78.1 HYPERTRIGLYCERIDEMIA WITHOUT HYPERCHOLESTEROLEMIA: Primary | ICD-10-CM

## (undated) DEVICE — PAD SACRAL SPAN AID

## (undated) DEVICE — BANDAGE ADH 1INX3IN NAT FAB N ADH PD CURAD

## (undated) DEVICE — PAIN TRAY: Brand: MEDLINE INDUSTRIES, INC.

## (undated) DEVICE — COVER,LIGHT,CAMERA,HARD,1/PK,STRL: Brand: MEDLINE

## (undated) DEVICE — REMOVER LOT 4OZ N IRRIG UNSCNT SFT MOIST LIQ

## (undated) DEVICE — COVER,TABLE,44X90,STERILE: Brand: MEDLINE

## (undated) DEVICE — GLOVE SUR 7.5 SENSICARE PIP WHT PWD F

## (undated) DEVICE — PAD,NON-ADHERENT,3X8,STERILE,LF,1/PK: Brand: MEDLINE

## (undated) DEVICE — 3M™ TEGADERM™ TRANSPARENT FILM DRESSING FRAME STYLE, 1626W, 4 IN X 4-3/4 IN (10 CM X 12 CM), 50/CT 4CT/CASE: Brand: 3M™ TEGADERM™

## (undated) DEVICE — 4-PORT MANIFOLD: Brand: NEPTUNE 2

## (undated) DEVICE — SPONGE,NEURO,0.5"X1.5",XR,STRL,LF,10/PK: Brand: MEDLINE

## (undated) DEVICE — INSULATED BLADE ELECTRODE: Brand: EDGE

## (undated) DEVICE — ANTISEPTIC 4OZ 70% ISO ALC

## (undated) DEVICE — ADMIRAL ACP DRILL, 16MM: Brand: ADMIRAL

## (undated) DEVICE — CODMAN® SURGICAL PATTIES 1/2" X 1/2" (1.27CM X 1.27CM): Brand: CODMAN®

## (undated) DEVICE — LAMINECTOMY CDS: Brand: MEDLINE INDUSTRIES, INC.

## (undated) DEVICE — DRAPE,THYROID,SOFT,STERILE: Brand: MEDLINE

## (undated) DEVICE — SOLUTION IRRIG 1000ML 0.9% NACL USP BTL

## (undated) DEVICE — SUT MCRYL 3-0 27IN ABSRB UD 19MM PS-2 3/8

## (undated) DEVICE — MICRO COVER: Brand: UNBRANDED

## (undated) DEVICE — GLOVE SUR 7.5 SENSICARE PI PIP CRM PWD F

## (undated) DEVICE — 3.0MM PRECISION NEURO (MATCH HEAD)

## (undated) DEVICE — C-ARM: Brand: UNBRANDED

## (undated) DEVICE — MONITORING NEUROPHYSIOLOGICAL

## (undated) DEVICE — SLEEVE COMPR MD KNEE LEN SGL USE KENDALL SCD

## (undated) DEVICE — TZ MEDICAL TITANIUM DISTRACTION PINS 14MM: Brand: TZ MEDICAL TITANIUM DISTRACTION PINS

## (undated) DEVICE — SUT COAT VCRL+ 2-0 18IN CP-2 ABSRB UD ANTIBAC

## (undated) DEVICE — SYRINGE 10ML SLIP TIP LOSS OF RESIST PLAS

## (undated) DEVICE — AVANOS* TUOHY EPIDURAL NEEDLE: Brand: AVANOS

## (undated) DEVICE — GLOVE,SURG,SENSICARE,ALOE,LF,PF,7: Brand: MEDLINE

## (undated) DEVICE — FRAZIER SUCTION INSTRUMENT 12 FR W/CONTROL VENT & OBTURATOR: Brand: FRAZIER

## (undated) DEVICE — ZZ-CONVERTED-TO-524825-ADHESIVE SKIN TOP FOR WND CLSR DERMBND ADV

## (undated) DEVICE — APPLICATOR PREP 26ML CHG 2% ISO ALC 70%

## (undated) DEVICE — GLOVE SUR 8 SENSICARE PI PIP GRN PWD F

## (undated) DEVICE — HEMOSTAT KIT SURGIFLO THROM 8ML

## (undated) NOTE — LETTER
25    Orthopedic Surgery   Pre-Operative Clearance Request    Patient Name:   Carmelo Covington             :   1970    Surgeon: Dr. Matias             Date of Surgery: 25    Surgical Procedure: C5-7 ANTERIOR CERVICAL DISCECTOMY AND FUSION       MUST COMPLETE ALL OF THE FOLLOWING 2-3 WEEKS PRIOR TO YOUR SURGERY TO AVOID CANCELLATION, DUE TO THE RULE THEIR WILL BE NO EXCEPTIONS!      [x]  History and Physical        [x]  Medical  Clearance                     Required pre-op testing to be ordered:                        [x]  CBC W/Diff                                                                   [x]  BMP                                                                                                                                                                            [x]  PT/INR    [x]  PTT     [x]  Type and Screen                   **Please fax test results, H&P, and clearance to [phone] and to P.A.T at [phone]**

## (undated) NOTE — ED AVS SNAPSHOT
THE [facility] in [facility] [address] [address]    Phone:  [phone]    Fax:  [phone]           Dolores Dove   MRN: [id number]    Department:  THE [facility] in Bed   Date of Visit:  1/26/2017           [address]  [address], [address]  [phone] [address]  [address]  [facility], [address]  [phone] [address] [facility] [address]   [phone]       To Check ER Wait Times:  MARIFER reading, you will be contacted. Please make sure we have your correct phone number before you leave. After you leave, you should follow the attached instructions. I have read and understand the instructions given to me by my caregivers.         24-Hour You can access your MyChart to more actively manage your health care and view more details from this visit by going to [email or web address].   If you've recently had a stay at the Hospital you can access your discharge instructions in [address] by niles

## (undated) NOTE — LETTER
05/02/18        [address] [facility] [address]      Dear Itz Soria,    [facility] records indicate that you have outstanding fasting lab work and or testing that was ordered for you and has not yet been completed:  Lab Frequency Next Occurrence

## (undated) NOTE — LETTER
[address] [facility] [address]           Dear Bartolo Barker     Our records indicate that you have outstanding lab work and or testing that was ordered for you and has not yet been completed:     Order Code Tests Ordered (Total: 1)    Order Code Tests Ordered      7600 LIPID PANEL         Order Code Tests Ordered (Total: 1)    Order Code Tests Ordered      47084 COMP METABOLIC PANEL (14)             To provide you with the best possible care, please complete these orders at your earliest convenience. If you have recently completed these orders please disregard this letter. If you have any questions please call the office at [phone].      Thank you,     [facility]

## (undated) NOTE — LETTER
Date & Time: 3/15/2024, 1:23 AM  Patient: Carmelo Covington  Encounter Provider(s):    Tres Riddle MD       To Whom It May Concern:    Carmelo Covington was seen and treated in our department on 3/14/2024. He should not return to work until cleared by urology .    If you have any questions or concerns, please do not hesitate to call.        _____________________________  Physician/APC Signature